# Patient Record
Sex: FEMALE | Race: BLACK OR AFRICAN AMERICAN | NOT HISPANIC OR LATINO
[De-identification: names, ages, dates, MRNs, and addresses within clinical notes are randomized per-mention and may not be internally consistent; named-entity substitution may affect disease eponyms.]

---

## 2017-02-28 ENCOUNTER — TRANSCRIPTION ENCOUNTER (OUTPATIENT)
Age: 42
End: 2017-02-28

## 2017-07-27 ENCOUNTER — EMERGENCY (EMERGENCY)
Facility: HOSPITAL | Age: 42
LOS: 1 days | Discharge: HOME | End: 2017-07-27
Attending: EMERGENCY MEDICINE
Payer: COMMERCIAL

## 2017-07-27 VITALS
WEIGHT: 192.9 LBS | HEART RATE: 82 BPM | OXYGEN SATURATION: 99 % | HEIGHT: 62 IN | RESPIRATION RATE: 18 BRPM | DIASTOLIC BLOOD PRESSURE: 61 MMHG | TEMPERATURE: 99 F | SYSTOLIC BLOOD PRESSURE: 128 MMHG

## 2017-07-27 LAB
APPEARANCE UR: CLEAR — SIGNIFICANT CHANGE UP
BILIRUB UR-MCNC: NEGATIVE — SIGNIFICANT CHANGE UP
COLOR SPEC: YELLOW — SIGNIFICANT CHANGE UP
DIFF PNL FLD: ABNORMAL
EPI CELLS # UR: SIGNIFICANT CHANGE UP
GLUCOSE UR QL: NEGATIVE MG/DL — SIGNIFICANT CHANGE UP
HCG UR QL: NEGATIVE — SIGNIFICANT CHANGE UP
KETONES UR-MCNC: NEGATIVE — SIGNIFICANT CHANGE UP
LEUKOCYTE ESTERASE UR-ACNC: ABNORMAL
NITRITE UR-MCNC: NEGATIVE — SIGNIFICANT CHANGE UP
PH UR: 7 — SIGNIFICANT CHANGE UP (ref 5–8)
PROT UR-MCNC: 30 MG/DL
RBC CASTS # UR COMP ASSIST: >50 /HPF (ref 0–4)
SP GR SPEC: 1.01 — SIGNIFICANT CHANGE UP (ref 1.01–1.02)
UROBILINOGEN FLD QL: 1 MG/DL
WBC UR QL: SIGNIFICANT CHANGE UP

## 2017-07-27 PROCEDURE — 96372 THER/PROPH/DIAG INJ SC/IM: CPT

## 2017-07-27 PROCEDURE — 99284 EMERGENCY DEPT VISIT MOD MDM: CPT

## 2017-07-27 PROCEDURE — 72100 X-RAY EXAM L-S SPINE 2/3 VWS: CPT

## 2017-07-27 PROCEDURE — 72100 X-RAY EXAM L-S SPINE 2/3 VWS: CPT | Mod: 26

## 2017-07-27 PROCEDURE — 99283 EMERGENCY DEPT VISIT LOW MDM: CPT | Mod: 25

## 2017-07-27 PROCEDURE — 81025 URINE PREGNANCY TEST: CPT

## 2017-07-27 PROCEDURE — 81001 URINALYSIS AUTO W/SCOPE: CPT

## 2017-07-27 RX ORDER — KETOROLAC TROMETHAMINE 30 MG/ML
60 SYRINGE (ML) INJECTION ONCE
Qty: 0 | Refills: 0 | Status: DISCONTINUED | OUTPATIENT
Start: 2017-07-27 | End: 2017-07-27

## 2017-07-27 RX ORDER — METHOCARBAMOL 500 MG/1
1500 TABLET, FILM COATED ORAL ONCE
Qty: 0 | Refills: 0 | Status: COMPLETED | OUTPATIENT
Start: 2017-07-27 | End: 2017-07-27

## 2017-07-27 RX ORDER — NITROFURANTOIN MACROCRYSTAL 50 MG
1 CAPSULE ORAL
Qty: 14 | Refills: 0 | OUTPATIENT
Start: 2017-07-27 | End: 2017-08-03

## 2017-07-27 RX ORDER — NITROFURANTOIN MACROCRYSTAL 50 MG
100 CAPSULE ORAL ONCE
Qty: 0 | Refills: 0 | Status: COMPLETED | OUTPATIENT
Start: 2017-07-27 | End: 2017-07-27

## 2017-07-27 RX ADMIN — METHOCARBAMOL 1500 MILLIGRAM(S): 500 TABLET, FILM COATED ORAL at 12:59

## 2017-07-27 RX ADMIN — Medication 60 MILLIGRAM(S): at 13:27

## 2017-07-27 RX ADMIN — Medication 100 MILLIGRAM(S): at 13:30

## 2017-07-27 RX ADMIN — Medication 60 MILLIGRAM(S): at 12:59

## 2017-07-27 NOTE — ED ADULT NURSE NOTE - OBJECTIVE STATEMENT
c/o low back pain after fall, denies pregnancy, medicated for pain per md ambulatory with steady gait

## 2017-07-27 NOTE — ED STATDOCS - ATTENDING CONTRIBUTION TO CARE
I, Joanne Stauffer, performed the initial face to face bedside interview with this patient regarding history of present illness, review of symptoms and relevant past medical, social and family history.  I completed an independent physical examination.  I was the initial provider who evaluated this patient. I have signed out the follow up of any pending tests (i.e. labs, radiological studies) to the ACP.  I have communicated the patient’s plan of care and disposition with the ACP.  The history, relevant review of systems, past medical and surgical history, medical decision making, and physical examination was documented by the scribe in my presence and I attest to the accuracy of the documentation.

## 2017-07-27 NOTE — ED STATDOCS - OBJECTIVE STATEMENT
40 y/o F PMHx back pain presents to ED c/o low back pain s/p fall x 2 days. Associated Sx of dysuria. Pt reports a few days she was sitting on a chair and slipped off her chair landing on her buttocks. Pt works at [employer] and reports pain worsening. Pt took 2 ibuprofen to no relief. LNMP one month ago. Had IUD, recently removed. Denies N/V/D, fever, chills, SOB, CP, difficulty breathing, HA, numbness, tingling and abd pain.

## 2017-07-27 NOTE — ED STATDOCS - PROGRESS NOTE DETAILS
Patient presents c/o low back pain s/p falling of chair 2 days ago and landing on buttocks, no radiculopathy, no weakness, no saddle anesthesia.  patient also c/o mild burning to urine since yesterday, no fever, no chills, no n/v/d. positive tenderness to lumbar spine, no sacral tenderness, FROM, STrength 5/5 to extremities

## 2017-07-27 NOTE — ED STATDOCS - MUSCULOSKELETAL, MLM
Upper lumbar spine TTP. Paravertebral muscle tenderness on L lumbar region. No TTP over sacral sulci. ROM not limited on hip.

## 2017-07-28 ENCOUNTER — EMERGENCY (EMERGENCY)
Facility: HOSPITAL | Age: 42
LOS: 0 days | Discharge: HOME | End: 2017-07-28
Attending: EMERGENCY MEDICINE | Admitting: EMERGENCY MEDICINE
Payer: COMMERCIAL

## 2017-07-28 VITALS
HEART RATE: 84 BPM | TEMPERATURE: 99 F | HEIGHT: 62 IN | RESPIRATION RATE: 15 BRPM | WEIGHT: 192.9 LBS | DIASTOLIC BLOOD PRESSURE: 89 MMHG | OXYGEN SATURATION: 100 % | SYSTOLIC BLOOD PRESSURE: 119 MMHG

## 2017-07-28 VITALS — HEIGHT: 62 IN | WEIGHT: 192.9 LBS

## 2017-07-28 DIAGNOSIS — Z91.81 HISTORY OF FALLING: ICD-10-CM

## 2017-07-28 DIAGNOSIS — M54.5 LOW BACK PAIN: ICD-10-CM

## 2017-07-28 PROCEDURE — 99284 EMERGENCY DEPT VISIT MOD MDM: CPT

## 2017-07-28 RX ORDER — KETOROLAC TROMETHAMINE 30 MG/ML
60 SYRINGE (ML) INJECTION ONCE
Qty: 0 | Refills: 0 | Status: DISCONTINUED | OUTPATIENT
Start: 2017-07-28 | End: 2017-07-28

## 2017-07-28 RX ORDER — METHOCARBAMOL 500 MG/1
1 TABLET, FILM COATED ORAL
Qty: 15 | Refills: 0 | OUTPATIENT
Start: 2017-07-28 | End: 2017-08-02

## 2017-07-28 RX ORDER — DEXAMETHASONE 0.5 MG/5ML
10 ELIXIR ORAL ONCE
Qty: 0 | Refills: 0 | Status: COMPLETED | OUTPATIENT
Start: 2017-07-28 | End: 2017-07-28

## 2017-07-28 RX ORDER — METHOCARBAMOL 500 MG/1
1500 TABLET, FILM COATED ORAL ONCE
Qty: 0 | Refills: 0 | Status: COMPLETED | OUTPATIENT
Start: 2017-07-28 | End: 2017-07-28

## 2017-07-28 RX ADMIN — METHOCARBAMOL 1500 MILLIGRAM(S): 500 TABLET, FILM COATED ORAL at 11:01

## 2017-07-28 RX ADMIN — Medication 10 MILLIGRAM(S): at 11:01

## 2017-07-28 RX ADMIN — Medication 60 MILLIGRAM(S): at 11:01

## 2017-07-28 NOTE — ED ADULT TRIAGE NOTE - CHIEF COMPLAINT QUOTE
c/o LBP s/p fall from chair "landed on my butt"; reports loss of bladder function now and increased pain with movement, denies pain at rest. Was seen at [employer] yesterday where she works and had an xray, was advised imaging was negative for acute findings.

## 2017-07-28 NOTE — ED STATDOCS - PROGRESS NOTE DETAILS
DEE DEE Stinson:  Intake provider's note reviewed. Pt p/w c/o back pain s/p fall. Denies any urinary complaint, saddle anesthesia, bowel or bladder incontinence.  PE: NAD. CV: S1, S2. Lungs: CTA bilat. Abd: soft, NT, ND. Neuro: LE 5/5 bilat.   Plan: pain meds, outpt f/u.

## 2017-07-28 NOTE — ED ADULT NURSE NOTE - CHIEF COMPLAINT QUOTE
c/o LBP s/p fall from chair "landed on my butt"; reports loss of bladder function now and increased pain with movement, denies pain at rest. Was seen at [facility] yesterday where she works and had an xray, was advised imaging was negative for acute findings.

## 2017-07-28 NOTE — ED STATDOCS - ATTENDING CONTRIBUTION TO CARE
I, David Yarbrough, performed the initial face to face bedside interview with this patient regarding history of present illness, review of symptoms and relevant past medical, social and family history.  I completed an independent physical examination.  I was the initial provider who evaluated this patient. I have signed out the follow up of any pending tests (i.e. labs, radiological studies) to the ACP.  I have communicated the patient’s plan of care and disposition with the ACP.  The history, relevant review of systems, past medical and surgical history, medical decision making, and physical examination was documented by the scribe in my presence and I attest to the accuracy of the documentation.

## 2017-07-28 NOTE — ED STATDOCS - MEDICAL DECISION MAKING DETAILS
40 y/o F presents to ED c/o back pain and injury s/p fall from chair two days ago. Likely sciatica. X-ray from [facility] yesterday was normal. Plan anti-inflammatory medicine and recommend follow up with PMD.

## 2017-07-28 NOTE — ED STATDOCS - OBJECTIVE STATEMENT
42 y/o F presents to ED c/o back pain and injury s/p fall from chair two days ago. Pt states that the pain radiates to the back of the legs and that she "cannot move" with involuntary urination, and heavy bleeding due to period. Pt states she was at [facility] yesterday and the results were negative. Pt states she has no difficulty walking. Pt has no other complaints and denies SOB, CP, fever/chills, n/v/d and HA.

## 2017-07-28 NOTE — ED ADULT TRIAGE NOTE - ARRIVAL FROM
Glad to know she will have some relief now. Thank you for the update  
PATIENT CALLED TO LET YOU KNOW SHE IS HAVING SURGERY TODAY AT [facility] ON HER BACK.       
Home

## 2018-03-22 ENCOUNTER — TRANSCRIPTION ENCOUNTER (OUTPATIENT)
Age: 43
End: 2018-03-22

## 2018-07-16 PROBLEM — M54.9 DORSALGIA, UNSPECIFIED: Chronic | Status: INACTIVE | Noted: 2017-07-27 | Resolved: 2017-07-28

## 2019-01-26 ENCOUNTER — EMERGENCY (EMERGENCY)
Facility: HOSPITAL | Age: 44
LOS: 0 days | Discharge: HOME | End: 2019-01-26
Attending: EMERGENCY MEDICINE | Admitting: EMERGENCY MEDICINE
Payer: COMMERCIAL

## 2019-01-26 VITALS — WEIGHT: 274.92 LBS | HEIGHT: 65 IN

## 2019-01-26 VITALS
TEMPERATURE: 98 F | SYSTOLIC BLOOD PRESSURE: 122 MMHG | DIASTOLIC BLOOD PRESSURE: 80 MMHG | RESPIRATION RATE: 16 BRPM | OXYGEN SATURATION: 100 % | HEART RATE: 88 BPM

## 2019-01-26 DIAGNOSIS — Z79.1 LONG TERM (CURRENT) USE OF NON-STEROIDAL ANTI-INFLAMMATORIES (NSAID): ICD-10-CM

## 2019-01-26 DIAGNOSIS — N93.9 ABNORMAL UTERINE AND VAGINAL BLEEDING, UNSPECIFIED: ICD-10-CM

## 2019-01-26 DIAGNOSIS — Z3A.01 LESS THAN 8 WEEKS GESTATION OF PREGNANCY: ICD-10-CM

## 2019-01-26 DIAGNOSIS — O20.0 THREATENED ABORTION: ICD-10-CM

## 2019-01-26 LAB
ALBUMIN SERPL ELPH-MCNC: 3.3 G/DL — SIGNIFICANT CHANGE UP (ref 3.3–5)
ALP SERPL-CCNC: 66 U/L — SIGNIFICANT CHANGE UP (ref 40–120)
ALT FLD-CCNC: 25 U/L — SIGNIFICANT CHANGE UP (ref 12–78)
ANION GAP SERPL CALC-SCNC: 6 MMOL/L — SIGNIFICANT CHANGE UP (ref 5–17)
APPEARANCE UR: ABNORMAL
APTT BLD: 29.3 SEC — SIGNIFICANT CHANGE UP (ref 27.5–36.3)
AST SERPL-CCNC: 16 U/L — SIGNIFICANT CHANGE UP (ref 15–37)
BACTERIA # UR AUTO: ABNORMAL
BASOPHILS # BLD AUTO: 0.03 K/UL — SIGNIFICANT CHANGE UP (ref 0–0.2)
BASOPHILS NFR BLD AUTO: 0.5 % — SIGNIFICANT CHANGE UP (ref 0–2)
BILIRUB SERPL-MCNC: 0.2 MG/DL — SIGNIFICANT CHANGE UP (ref 0.2–1.2)
BILIRUB UR-MCNC: NEGATIVE — SIGNIFICANT CHANGE UP
BLD GP AB SCN SERPL QL: SIGNIFICANT CHANGE UP
BUN SERPL-MCNC: 9 MG/DL — SIGNIFICANT CHANGE UP (ref 7–23)
CALCIUM SERPL-MCNC: 9 MG/DL — SIGNIFICANT CHANGE UP (ref 8.5–10.1)
CHLORIDE SERPL-SCNC: 105 MMOL/L — SIGNIFICANT CHANGE UP (ref 96–108)
CO2 SERPL-SCNC: 27 MMOL/L — SIGNIFICANT CHANGE UP (ref 22–31)
COLOR SPEC: YELLOW — SIGNIFICANT CHANGE UP
CREAT SERPL-MCNC: 0.73 MG/DL — SIGNIFICANT CHANGE UP (ref 0.5–1.3)
DIFF PNL FLD: ABNORMAL
EOSINOPHIL # BLD AUTO: 0.11 K/UL — SIGNIFICANT CHANGE UP (ref 0–0.5)
EOSINOPHIL NFR BLD AUTO: 1.8 % — SIGNIFICANT CHANGE UP (ref 0–6)
EPI CELLS # UR: SIGNIFICANT CHANGE UP
GLUCOSE SERPL-MCNC: 121 MG/DL — HIGH (ref 70–99)
GLUCOSE UR QL: NEGATIVE MG/DL — SIGNIFICANT CHANGE UP
HCG SERPL-ACNC: HIGH MIU/ML
HCT VFR BLD CALC: 37.5 % — SIGNIFICANT CHANGE UP (ref 34.5–45)
HGB BLD-MCNC: 12.2 G/DL — SIGNIFICANT CHANGE UP (ref 11.5–15.5)
IMM GRANULOCYTES NFR BLD AUTO: 0.2 % — SIGNIFICANT CHANGE UP (ref 0–1.5)
INR BLD: 1.06 RATIO — SIGNIFICANT CHANGE UP (ref 0.88–1.16)
KETONES UR-MCNC: NEGATIVE — SIGNIFICANT CHANGE UP
LEUKOCYTE ESTERASE UR-ACNC: ABNORMAL
LYMPHOCYTES # BLD AUTO: 2.06 K/UL — SIGNIFICANT CHANGE UP (ref 1–3.3)
LYMPHOCYTES # BLD AUTO: 33 % — SIGNIFICANT CHANGE UP (ref 13–44)
MCHC RBC-ENTMCNC: 27 PG — SIGNIFICANT CHANGE UP (ref 27–34)
MCHC RBC-ENTMCNC: 32.5 GM/DL — SIGNIFICANT CHANGE UP (ref 32–36)
MCV RBC AUTO: 83 FL — SIGNIFICANT CHANGE UP (ref 80–100)
MONOCYTES # BLD AUTO: 0.59 K/UL — SIGNIFICANT CHANGE UP (ref 0–0.9)
MONOCYTES NFR BLD AUTO: 9.4 % — SIGNIFICANT CHANGE UP (ref 2–14)
NEUTROPHILS # BLD AUTO: 3.45 K/UL — SIGNIFICANT CHANGE UP (ref 1.8–7.4)
NEUTROPHILS NFR BLD AUTO: 55.1 % — SIGNIFICANT CHANGE UP (ref 43–77)
NITRITE UR-MCNC: NEGATIVE — SIGNIFICANT CHANGE UP
NRBC # BLD: 0 /100 WBCS — SIGNIFICANT CHANGE UP (ref 0–0)
PH UR: 7 — SIGNIFICANT CHANGE UP (ref 5–8)
PLATELET # BLD AUTO: 326 K/UL — SIGNIFICANT CHANGE UP (ref 150–400)
POTASSIUM SERPL-MCNC: 4.1 MMOL/L — SIGNIFICANT CHANGE UP (ref 3.5–5.3)
POTASSIUM SERPL-SCNC: 4.1 MMOL/L — SIGNIFICANT CHANGE UP (ref 3.5–5.3)
PROT SERPL-MCNC: 7.9 GM/DL — SIGNIFICANT CHANGE UP (ref 6–8.3)
PROT UR-MCNC: NEGATIVE MG/DL — SIGNIFICANT CHANGE UP
PROTHROM AB SERPL-ACNC: 11.8 SEC — SIGNIFICANT CHANGE UP (ref 10–12.9)
RBC # BLD: 4.52 M/UL — SIGNIFICANT CHANGE UP (ref 3.8–5.2)
RBC # FLD: 16.4 % — HIGH (ref 10.3–14.5)
RBC CASTS # UR COMP ASSIST: >50 /HPF (ref 0–4)
SODIUM SERPL-SCNC: 138 MMOL/L — SIGNIFICANT CHANGE UP (ref 135–145)
SP GR SPEC: 1 — LOW (ref 1.01–1.02)
TYPE + AB SCN PNL BLD: SIGNIFICANT CHANGE UP
UROBILINOGEN FLD QL: NEGATIVE MG/DL — SIGNIFICANT CHANGE UP
WBC # BLD: 6.25 K/UL — SIGNIFICANT CHANGE UP (ref 3.8–10.5)
WBC # FLD AUTO: 6.25 K/UL — SIGNIFICANT CHANGE UP (ref 3.8–10.5)
WBC UR QL: SIGNIFICANT CHANGE UP

## 2019-01-26 PROCEDURE — 76801 OB US < 14 WKS SINGLE FETUS: CPT | Mod: 26

## 2019-01-26 PROCEDURE — 99283 EMERGENCY DEPT VISIT LOW MDM: CPT

## 2019-01-26 PROCEDURE — 76815 OB US LIMITED FETUS(S): CPT | Mod: 26

## 2019-01-26 RX ORDER — SODIUM CHLORIDE 9 MG/ML
1000 INJECTION INTRAMUSCULAR; INTRAVENOUS; SUBCUTANEOUS ONCE
Qty: 0 | Refills: 0 | Status: COMPLETED | OUTPATIENT
Start: 2019-01-26 | End: 2019-01-26

## 2019-01-26 RX ADMIN — SODIUM CHLORIDE 9999 MILLILITER(S): 9 INJECTION INTRAMUSCULAR; INTRAVENOUS; SUBCUTANEOUS at 21:15

## 2019-01-26 RX ADMIN — SODIUM CHLORIDE 1000 MILLILITER(S): 9 INJECTION INTRAMUSCULAR; INTRAVENOUS; SUBCUTANEOUS at 22:15

## 2019-01-26 NOTE — ED STATDOCS - ATTENDING CONTRIBUTION TO CARE
Attending Contribution to Care: I, Ashley Briscoe, performed the initial face to face bedside interview with this patient regarding history of present illness, review of symptoms and relevant past medical, social and family history.  I completed an independent physical examination.  I was the initial provider who evaluated this patient. I have signed out the follow up of any pending tests (i.e. labs, radiological studies) to the ACP.  I have communicated the patient’s plan of care and disposition with the ACP.

## 2019-01-26 NOTE — ED STATDOCS - OBJECTIVE STATEMENT
42 y/o F with no pertinent PMHx presenting to the ED c/o intermittent vaginal bleeding starting two days ago. Pt is ~6 weeks pregnant, LMP . A1. Seen by OB with confirmation of IUP on 01/15. Went back to OB  and informed that the fetus did not appear to be developing normally. Two days ago, pt started experiencing intermittent episodes of vaginal bleeding with associated abdominal cramping. +N/V this morning. Came into ED today for evaluation. Denies any CP, SOB, diarrhea, fevers, or chills. NKDA.

## 2019-01-26 NOTE — ED STATDOCS - PROGRESS NOTE DETAILS
signed Elisa Machado PA-C Pt seen in intake initially by Dr Briscoe.   43F c/o vaginal spotting x 2 days. pt is approx 6 weeks pregnant, LMP 12/5, confirmed IUP on OB office on 1/15.  Sono on 1/22 did not appear to be progessing normally. Awaiting labs, sono. Pt alert, NAD. OB Dr Jaya Ceron. signed Elisa Machado PA-C   No significant findings on labwork. sono pending. Case endorsed to KIN Higgins. signed Elisa Machado PA-C   No significant findings on labwork. sono pending.

## 2019-01-26 NOTE — ED STATDOCS - MEDICAL DECISION MAKING DETAILS
42 y/o pregnant female with vaginal bleeding. Plan: labs, urine, US to evaluate first trimester bleeding, reassess.

## 2019-01-29 ENCOUNTER — ASOB RESULT (OUTPATIENT)
Age: 44
End: 2019-01-29

## 2019-01-29 ENCOUNTER — APPOINTMENT (OUTPATIENT)
Age: 44
End: 2019-01-29
Payer: COMMERCIAL

## 2019-01-29 ENCOUNTER — APPOINTMENT (OUTPATIENT)
Dept: ULTRASOUND IMAGING | Facility: CLINIC | Age: 44
End: 2019-01-29

## 2019-01-29 PROCEDURE — 76817 TRANSVAGINAL US OBSTETRIC: CPT

## 2019-01-30 ENCOUNTER — INPATIENT (INPATIENT)
Facility: HOSPITAL | Age: 44
LOS: 0 days | Discharge: HOME | End: 2019-01-31
Attending: OBSTETRICS & GYNECOLOGY | Admitting: OBSTETRICS & GYNECOLOGY
Payer: COMMERCIAL

## 2019-01-30 VITALS — WEIGHT: 225.97 LBS | HEIGHT: 62 IN

## 2019-01-30 DIAGNOSIS — O34.219 MATERNAL CARE FOR UNSPECIFIED TYPE SCAR FROM PREVIOUS CESAREAN DELIVERY: Chronic | ICD-10-CM

## 2019-01-30 LAB
ALBUMIN SERPL ELPH-MCNC: 3.5 G/DL — SIGNIFICANT CHANGE UP (ref 3.3–5)
ALP SERPL-CCNC: 66 U/L — SIGNIFICANT CHANGE UP (ref 40–120)
ALT FLD-CCNC: 26 U/L — SIGNIFICANT CHANGE UP (ref 12–78)
ANION GAP SERPL CALC-SCNC: 9 MMOL/L — SIGNIFICANT CHANGE UP (ref 5–17)
AST SERPL-CCNC: 14 U/L — LOW (ref 15–37)
BASOPHILS # BLD AUTO: 0.02 K/UL — SIGNIFICANT CHANGE UP (ref 0–0.2)
BASOPHILS NFR BLD AUTO: 0.3 % — SIGNIFICANT CHANGE UP (ref 0–2)
BILIRUB SERPL-MCNC: 0.2 MG/DL — SIGNIFICANT CHANGE UP (ref 0.2–1.2)
BUN SERPL-MCNC: 9 MG/DL — SIGNIFICANT CHANGE UP (ref 7–23)
CALCIUM SERPL-MCNC: 9.1 MG/DL — SIGNIFICANT CHANGE UP (ref 8.5–10.1)
CHLORIDE SERPL-SCNC: 104 MMOL/L — SIGNIFICANT CHANGE UP (ref 96–108)
CO2 SERPL-SCNC: 25 MMOL/L — SIGNIFICANT CHANGE UP (ref 22–31)
CREAT SERPL-MCNC: 0.7 MG/DL — SIGNIFICANT CHANGE UP (ref 0.5–1.3)
EOSINOPHIL # BLD AUTO: 0.09 K/UL — SIGNIFICANT CHANGE UP (ref 0–0.5)
EOSINOPHIL NFR BLD AUTO: 1.2 % — SIGNIFICANT CHANGE UP (ref 0–6)
GLUCOSE SERPL-MCNC: 134 MG/DL — HIGH (ref 70–99)
HCG SERPL-ACNC: HIGH MIU/ML
HCT VFR BLD CALC: 36.5 % — SIGNIFICANT CHANGE UP (ref 34.5–45)
HGB BLD-MCNC: 12 G/DL — SIGNIFICANT CHANGE UP (ref 11.5–15.5)
IMM GRANULOCYTES NFR BLD AUTO: 0.3 % — SIGNIFICANT CHANGE UP (ref 0–1.5)
LYMPHOCYTES # BLD AUTO: 1.63 K/UL — SIGNIFICANT CHANGE UP (ref 1–3.3)
LYMPHOCYTES # BLD AUTO: 22.3 % — SIGNIFICANT CHANGE UP (ref 13–44)
MCHC RBC-ENTMCNC: 27 PG — SIGNIFICANT CHANGE UP (ref 27–34)
MCHC RBC-ENTMCNC: 32.9 GM/DL — SIGNIFICANT CHANGE UP (ref 32–36)
MCV RBC AUTO: 82.2 FL — SIGNIFICANT CHANGE UP (ref 80–100)
MONOCYTES # BLD AUTO: 0.48 K/UL — SIGNIFICANT CHANGE UP (ref 0–0.9)
MONOCYTES NFR BLD AUTO: 6.6 % — SIGNIFICANT CHANGE UP (ref 2–14)
NEUTROPHILS # BLD AUTO: 5.07 K/UL — SIGNIFICANT CHANGE UP (ref 1.8–7.4)
NEUTROPHILS NFR BLD AUTO: 69.3 % — SIGNIFICANT CHANGE UP (ref 43–77)
NRBC # BLD: 0 /100 WBCS — SIGNIFICANT CHANGE UP (ref 0–0)
PLATELET # BLD AUTO: 309 K/UL — SIGNIFICANT CHANGE UP (ref 150–400)
POTASSIUM SERPL-MCNC: 3.6 MMOL/L — SIGNIFICANT CHANGE UP (ref 3.5–5.3)
POTASSIUM SERPL-SCNC: 3.6 MMOL/L — SIGNIFICANT CHANGE UP (ref 3.5–5.3)
PROT SERPL-MCNC: 8.2 GM/DL — SIGNIFICANT CHANGE UP (ref 6–8.3)
RBC # BLD: 4.44 M/UL — SIGNIFICANT CHANGE UP (ref 3.8–5.2)
RBC # FLD: 16 % — HIGH (ref 10.3–14.5)
SODIUM SERPL-SCNC: 138 MMOL/L — SIGNIFICANT CHANGE UP (ref 135–145)
WBC # BLD: 7.31 K/UL — SIGNIFICANT CHANGE UP (ref 3.8–10.5)
WBC # FLD AUTO: 7.31 K/UL — SIGNIFICANT CHANGE UP (ref 3.8–10.5)

## 2019-01-30 PROCEDURE — 99285 EMERGENCY DEPT VISIT HI MDM: CPT

## 2019-01-30 PROCEDURE — 76830 TRANSVAGINAL US NON-OB: CPT | Mod: 26

## 2019-01-30 PROCEDURE — 76856 US EXAM PELVIC COMPLETE: CPT | Mod: 26

## 2019-01-30 PROCEDURE — 99285 EMERGENCY DEPT VISIT HI MDM: CPT | Mod: 25

## 2019-01-30 RX ORDER — HYDROMORPHONE HYDROCHLORIDE 2 MG/ML
1 INJECTION INTRAMUSCULAR; INTRAVENOUS; SUBCUTANEOUS ONCE
Qty: 0 | Refills: 0 | Status: DISCONTINUED | OUTPATIENT
Start: 2019-01-30 | End: 2019-01-30

## 2019-01-30 RX ORDER — ONDANSETRON 8 MG/1
4 TABLET, FILM COATED ORAL EVERY 6 HOURS
Qty: 0 | Refills: 0 | Status: DISCONTINUED | OUTPATIENT
Start: 2019-01-30 | End: 2019-01-31

## 2019-01-30 RX ORDER — MISOPROSTOL 200 UG/1
2 TABLET ORAL
Qty: 2 | Refills: 0 | OUTPATIENT
Start: 2019-01-30 | End: 2019-01-30

## 2019-01-30 RX ORDER — ACETAMINOPHEN 500 MG
1000 TABLET ORAL ONCE
Qty: 0 | Refills: 0 | Status: COMPLETED | OUTPATIENT
Start: 2019-01-30 | End: 2019-01-30

## 2019-01-30 RX ORDER — ACETAMINOPHEN 500 MG
650 TABLET ORAL EVERY 6 HOURS
Qty: 0 | Refills: 0 | Status: DISCONTINUED | OUTPATIENT
Start: 2019-01-30 | End: 2019-01-31

## 2019-01-30 RX ORDER — OXYCODONE AND ACETAMINOPHEN 5; 325 MG/1; MG/1
1 TABLET ORAL ONCE
Qty: 0 | Refills: 0 | Status: DISCONTINUED | OUTPATIENT
Start: 2019-01-30 | End: 2019-01-30

## 2019-01-30 RX ORDER — DIPHENHYDRAMINE HCL 50 MG
50 CAPSULE ORAL ONCE
Qty: 0 | Refills: 0 | Status: COMPLETED | OUTPATIENT
Start: 2019-01-30 | End: 2019-01-30

## 2019-01-30 RX ORDER — ONDANSETRON 8 MG/1
4 TABLET, FILM COATED ORAL ONCE
Qty: 0 | Refills: 0 | Status: COMPLETED | OUTPATIENT
Start: 2019-01-30 | End: 2019-01-30

## 2019-01-30 RX ORDER — MORPHINE SULFATE 50 MG/1
4 CAPSULE, EXTENDED RELEASE ORAL ONCE
Qty: 0 | Refills: 0 | Status: DISCONTINUED | OUTPATIENT
Start: 2019-01-30 | End: 2019-01-30

## 2019-01-30 RX ORDER — SODIUM CHLORIDE 9 MG/ML
1000 INJECTION INTRAMUSCULAR; INTRAVENOUS; SUBCUTANEOUS ONCE
Qty: 0 | Refills: 0 | Status: COMPLETED | OUTPATIENT
Start: 2019-01-30 | End: 2019-01-30

## 2019-01-30 RX ORDER — HYDROMORPHONE HYDROCHLORIDE 2 MG/ML
2 INJECTION INTRAMUSCULAR; INTRAVENOUS; SUBCUTANEOUS
Qty: 0 | Refills: 0 | Status: DISCONTINUED | OUTPATIENT
Start: 2019-01-30 | End: 2019-01-31

## 2019-01-30 RX ORDER — METOCLOPRAMIDE HCL 10 MG
10 TABLET ORAL ONCE
Qty: 0 | Refills: 0 | Status: COMPLETED | OUTPATIENT
Start: 2019-01-30 | End: 2019-01-30

## 2019-01-30 RX ORDER — OXYCODONE AND ACETAMINOPHEN 5; 325 MG/1; MG/1
2 TABLET ORAL EVERY 4 HOURS
Qty: 0 | Refills: 0 | Status: DISCONTINUED | OUTPATIENT
Start: 2019-01-30 | End: 2019-01-31

## 2019-01-30 RX ADMIN — Medication 400 MILLIGRAM(S): at 18:43

## 2019-01-30 RX ADMIN — Medication 10 MILLIGRAM(S): at 23:15

## 2019-01-30 RX ADMIN — MORPHINE SULFATE 4 MILLIGRAM(S): 50 CAPSULE, EXTENDED RELEASE ORAL at 22:37

## 2019-01-30 RX ADMIN — OXYCODONE AND ACETAMINOPHEN 1 TABLET(S): 5; 325 TABLET ORAL at 23:07

## 2019-01-30 RX ADMIN — MORPHINE SULFATE 4 MILLIGRAM(S): 50 CAPSULE, EXTENDED RELEASE ORAL at 19:58

## 2019-01-30 RX ADMIN — Medication 1000 MILLIGRAM(S): at 19:58

## 2019-01-30 RX ADMIN — ONDANSETRON 4 MILLIGRAM(S): 8 TABLET, FILM COATED ORAL at 20:10

## 2019-01-30 RX ADMIN — HYDROMORPHONE HYDROCHLORIDE 1 MILLIGRAM(S): 2 INJECTION INTRAMUSCULAR; INTRAVENOUS; SUBCUTANEOUS at 21:05

## 2019-01-30 RX ADMIN — SODIUM CHLORIDE 2000 MILLILITER(S): 9 INJECTION INTRAMUSCULAR; INTRAVENOUS; SUBCUTANEOUS at 18:43

## 2019-01-30 RX ADMIN — ONDANSETRON 4 MILLIGRAM(S): 8 TABLET, FILM COATED ORAL at 18:43

## 2019-01-30 RX ADMIN — Medication 50 MILLIGRAM(S): at 23:15

## 2019-01-30 RX ADMIN — OXYCODONE AND ACETAMINOPHEN 1 TABLET(S): 5; 325 TABLET ORAL at 22:32

## 2019-01-30 RX ADMIN — HYDROMORPHONE HYDROCHLORIDE 1 MILLIGRAM(S): 2 INJECTION INTRAMUSCULAR; INTRAVENOUS; SUBCUTANEOUS at 20:10

## 2019-01-30 RX ADMIN — MORPHINE SULFATE 4 MILLIGRAM(S): 50 CAPSULE, EXTENDED RELEASE ORAL at 22:55

## 2019-01-30 RX ADMIN — MORPHINE SULFATE 4 MILLIGRAM(S): 50 CAPSULE, EXTENDED RELEASE ORAL at 18:53

## 2019-01-30 NOTE — H&P ADULT - NSHPPHYSICALEXAM_GEN_ALL_CORE
Vital Signs Last 24 Hrs  T(C): 37.1 (30 Jan 2019 21:32), Max: 37.1 (30 Jan 2019 21:32)  T(F): 98.8 (30 Jan 2019 21:32), Max: 98.8 (30 Jan 2019 21:32)  HR: 82 (30 Jan 2019 21:13) (82 - 84)  BP: 122/82 (30 Jan 2019 21:13) (109/91 - 122/82)  BP(mean): --  RR: 18 (30 Jan 2019 21:13) (18 - 20)  SpO2: 100% (30 Jan 2019 21:13) (100% - 100%)

## 2019-01-30 NOTE — ED STATDOCS - ATTENDING CONTRIBUTION TO CARE
I, Mandeep Hackett, performed the initial face to face bedside interview with this patient regarding history of present illness, review of symptoms and relevant past medical, social and family history.  I completed an independent physical examination.  I was the initial provider who evaluated this patient. I have signed out the follow up of any pending tests (i.e. labs, radiological studies) to the ACP.  I have communicated the patient’s plan of care and disposition with the ACP.  The history, relevant review of systems, past medical and surgical history, medical decision making, and physical examination was documented by the scribe in my presence and I attest to the accuracy of the documentation.

## 2019-01-30 NOTE — ED ADULT NURSE NOTE - NSIMPLEMENTINTERV_GEN_ALL_ED
Implemented All Universal Safety Interventions:  [place] to call system. Call bell, personal items and telephone within reach. Instruct patient to call for assistance. Room bathroom lighting operational. Non-slip footwear when patient is off stretcher. Physically safe environment: no spills, clutter or unnecessary equipment. Stretcher in lowest position, wheels locked, appropriate side rails in place.

## 2019-01-30 NOTE — ED ADULT NURSE NOTE - OBJECTIVE STATEMENT
pt c/o lower abd pain started at 1pm today.pt is 5 weeks pregnant spotting, last time changed pad at 3pm.

## 2019-01-30 NOTE — ED ADULT NURSE REASSESSMENT NOTE - NS ED NURSE REASSESS COMMENT FT1
change of shift report received from BLAYNE Reeves, pt is A&Ox3, MAEx4. accompanied by family. pt is c/o abdominal pain after returning from US. DEE DEE Higgins notified, awaiting further orders. will continue to monitor.

## 2019-01-30 NOTE — ED STATDOCS - OBJECTIVE STATEMENT
44 y/o female A1 7 weeks pregnant yesterday with a PMHx of back pain presents to the ED c/o vaginal bleeding, intermittent lower abdominal cramping, nausea, vomiting today. Pt seen on 19 for vaginal bleeding, US showed that fetus is not progressing normally. Pt seen by GYN yesterday for vaginal spotting and the vaginal bleeding has continued since. NKDA. LNMP: . GYN: Dr. Ceron.

## 2019-01-30 NOTE — ED STATDOCS - PROGRESS NOTE DETAILS
42 y/o F, A1 with no PMH presents with persistent vaginal bleeding, described as spotting and lower abdominal cramping. pt was seen for same in this ED 4 days ago, seen by OBGYN 2 days ago. US from 19 shows gestational sac with no fetal pole, estimated 6w GA. Pts LMP 19. Pt states she recently had due date pushed back 2 weeks. Denies recent fever, chills, dysuria, hematuria. +nausea & vomiting. PE: patient uncomfortable. Crying during exam. 44 y/o F, A1 with no PMH presents with persistent vaginal bleeding, described as spotting and lower abdominal cramping. pt was seen for same in this ED 4 days ago, seen by OBGYN 2 days ago. US from 19 shows gestational sac with no fetal pole, estimated 6w GA. Pts LMP 19. Pt states she recently had due date pushed back 2 weeks. Denies recent fever, chills, dysuria, hematuria. +nausea & vomiting. PE: patient uncomfortable. Crying during exam. Cardiac: s1/s2, RRR. Lungs: CTAB. Abdomen: Obese. NBS x4, soft, +lower abdominal tenderness. A/P: Threatened . Plan for labs, analgesia, OBGYN consult. pelvic US. Reassess. - Ramón Harley PA-C D/w Patient's OBGYN. States pt's daughter called emergency line ([phone]) after patient left for hospital. Pt believed at that time to be en route to [facility] as Dr. Ceron does not have privileges at [facility]. Advised to contact inpatient team at  with any complications. If patient able to be discharged may follow up in office tomorrow [address] [place] [address]. - TWAN GentileC Seen and examined by Dr. Yarbrough OB on call. Will send meds to pharmacy. Patient complaining of severe abdominal pain. Dr. Yarbrough called and agreed to have patient admitted for pain control. Rach NP

## 2019-01-30 NOTE — CONSULT NOTE ADULT - SUBJECTIVE AND OBJECTIVE BOX
SUBJECTIVE: 44yo  self-presents to ED due to acute onset of intermittent cramping lower abdominal pain starting at 3pm today. LMP 18. For 1 week patient has had intermittent vaginal spotting, but denies passing blood clots. Patient seen in [facility] ED  and U/S showed intrauterine pregnancy @6w0d with BHcG 16K.    Lower abdominal pain radiating to lower back, no aggravating or alleviating symptoms. Abdominal pain associated with intermittent nausea and 2 bouts of non-bilious, non-bloody emesis.     In the ED, patient received 1L NS bolus, 1G Tylenol IV, Morphine 4mg IV, Dilaudid 1mg IV and Zofran 4mg IV x2. Patient notes that there was some relief with the Dilaudid.      REVIEW OF SYSTEMS:  CONSTITUTIONAL: No weakness, fevers or chills  EYES/ENT: No visual changes;  No vertigo or throat pain   NECK: No pain or stiffness  RESPIRATORY: No cough, wheezing, hemoptysis; No shortness of breath  CARDIOVASCULAR: No chest pain or palpitations  GASTROINTESTINAL: + b/l lower abdominal pain. + nausea and vomiting; No diarrhea or constipation. No melena or hematochezia.  GENITOURINARY: No dysuria, frequency or hematuria  NEUROLOGICAL: No numbness or weakness  SKIN: No itching, burning, rashes, or lesions   GYN: intermittent spotting   All other review of systems is negative unless indicated above    Vital Signs Last 24 Hrs  T(C): 37 (2019 18:18), Max: 37 (2019 18:18)  T(F): 98.6 (2019 18:18), Max: 98.6 (2019 18:18)  HR: 84 (2019 18:18) (84 - 84)  BP: 109/91 (2019 18:18) (109/91 - 109/91)  BP(mean): --  RR: 20 (2019 18:18) (20 - 20)  SpO2: 100% (2019 18:18) (100% - 100%)      PHYSICAL EXAM:  Constitutional: NAD, awake and alert, well-developed; obese  HEENT: PERR, EOMI, Normal Hearing, MMM  Neck: Soft and supple, No LAD, No JVD  Respiratory: Breath sounds are clear bilaterally, No wheezing, rales or rhonchi  Cardiovascular: S1 and S2, regular rate and rhythm, no Murmurs, gallops or rubs  Gastrointestinal: Bowel Sounds present, soft, nontender to deep palpation, nondistended, no guarding, no rebound  Extremities: No peripheral edema  Vascular: 2+ peripheral pulses  Neurological: A/O x 3, no focal deficits  Musculoskeletal: 5/5 strength b/l upper and lower extremities  Skin: No rashes  Vaginal Exam:         LABS: All Labs Reviewed:                        12.0   7.31  )-----------( 309      ( 2019 18:32 )             36.5         138  |  104  |  9   ----------------------------<  134<H>  3.6   |  25  |  0.70    Ca    9.1      2019 18:32    TPro  8.2  /  Alb  3.5  /  TBili  0.2  /  DBili  x   /  AST  14<L>  /  ALT  26  /  AlkPhos  66      < from: US Pelvis Complete (19 @ 19:29) >  FINDINGS:    Uterus: 16.3 x 7.1 x 8.0 cm. no intrauterine gestation is identified.    Endometrium: 2.5 cm. Heterogeneity is noted consistent with blood clots   and missed .    Right ovary: 1.7 x 2.0 x 2.2 cm. Within normal limits.        Left ovary: 2.1 x 2.7 x 2.5 cm. Within normal limits.        Fluid: None.    IMPRESSION:    No evidence of intrauterine gestation.Heterogeneity within thickened   endometrium consistent with missed . SUBJECTIVE: 42yo  self-presents to ED due to acute onset of intermittent cramping lower abdominal pain starting at 3pm today. LMP 18. For 1 week patient has had intermittent vaginal spotting, but denies passing blood clots. Patient seen in [facility] ED  and U/S showed intrauterine pregnancy @6w0d with BHcG 16K. Sent home with f/u    Lower abdominal pain radiating to lower back, no aggravating or alleviating symptoms. Abdominal pain associated with intermittent nausea and 2 bouts of non-bilious, non-bloody emesis.     In the ED, patient received 1L NS bolus, 1G Tylenol IV, Morphine 4mg IV, Dilaudid 1mg IV and Zofran 4mg IV x2. Patient notes that there was some relief with the Dilaudid.      REVIEW OF SYSTEMS:  CONSTITUTIONAL: No weakness, fevers or chills  EYES/ENT: No visual changes;  No vertigo or throat pain   NECK: No pain or stiffness  RESPIRATORY: No cough, wheezing, hemoptysis; No shortness of breath  CARDIOVASCULAR: No chest pain or palpitations  GASTROINTESTINAL: + b/l lower abdominal pain. + nausea and vomiting; No diarrhea or constipation. No melena or hematochezia.  GENITOURINARY: No dysuria, frequency or hematuria  NEUROLOGICAL: No numbness or weakness  SKIN: No itching, burning, rashes, or lesions   GYN: intermittent spotting   All other review of systems is negative unless indicated above    Vital Signs Last 24 Hrs  T(C): 37 (2019 18:18), Max: 37 (2019 18:18)  T(F): 98.6 (2019 18:18), Max: 98.6 (2019 18:18)  HR: 82 (2019 21:13) (82 - 84)  BP: 122/82 (2019 21:13) (109/91 - 122/82)  RR: 18 (2019 21:13) (18 - 20)  SpO2: 100% (2019 21:13) (100% - 100%)      PHYSICAL EXAM:  Constitutional: NAD, awake and alert, well-developed; obese  HEENT: PERR, EOMI, Normal Hearing, MMM  Neck: Soft and supple, No LAD, No JVD  Respiratory: Breath sounds are clear bilaterally, No wheezing, rales or rhonchi  Cardiovascular: S1 and S2, regular rate and rhythm, no Murmurs, gallops or rubs  Gastrointestinal: Bowel Sounds present, soft, nontender to deep palpation, nondistended, no guarding, no rebound  Extremities: No peripheral edema  Vascular: 2+ peripheral pulses  Neurological: A/O x 3, no focal deficits  Musculoskeletal: 5/5 strength b/l upper and lower extremities  Skin: No rashes  Vaginal Exam:         LABS: All Labs Reviewed:                        12.0   7.31  )-----------( 309      ( 2019 18:32 )             36.5         138  |  104  |  9   ----------------------------<  134<H>  3.6   |  25  |  0.70    Ca    9.1      2019 18:32    TPro  8.2  /  Alb  3.5  /  TBili  0.2  /  DBili  x   /  AST  14<L>  /  ALT  26  /  AlkPhos  66      < from: US Pelvis Complete (19 @ 19:29) >  FINDINGS:    Uterus: 16.3 x 7.1 x 8.0 cm. no intrauterine gestation is identified.    Endometrium: 2.5 cm. Heterogeneity is noted consistent with blood clots   and missed .    Right ovary: 1.7 x 2.0 x 2.2 cm. Within normal limits.        Left ovary: 2.1 x 2.7 x 2.5 cm. Within normal limits.        Fluid: None.    IMPRESSION:    No evidence of intrauterine gestation.Heterogeneity within thickened   endometrium consistent with missed . SUBJECTIVE: 44yo  self-presents to ED due to acute onset of intermittent cramping lower abdominal pain starting at 3pm today. LMP 18.  Lower abdominal pain radiating to lower back, no aggravating or alleviating symptoms. Abdominal pain associated with intermittent nausea and 2 bouts of non-bilious, non-bloody emesis.    For 1 week patient has had intermittent vaginal spotting, but denies passing blood clots. Patient seen in [facility] ED  and U/S showed intrauterine pregnancy @6w0d with BHcG 16K. Sent home with f/u with primary OB. Seen by [facility] and sonogram showed 5w4d on 19. Seen by primary OB in AM today for repeat BHCG - symptoms free at that time.     In the ED, patient received 1L NS bolus, 1G Tylenol IV, Morphine 4mg IV, Dilaudid 1mg IV and Zofran 4mg IV x2. Patient notes that there was some relief with the Dilaudid.      REVIEW OF SYSTEMS:  CONSTITUTIONAL: No weakness, fevers or chills  EYES/ENT: No visual changes;  No vertigo or throat pain   NECK: No pain or stiffness  RESPIRATORY: No cough, wheezing, hemoptysis; No shortness of breath  CARDIOVASCULAR: No chest pain or palpitations  GASTROINTESTINAL: + b/l lower abdominal pain. + nausea and vomiting; No diarrhea or constipation. No melena or hematochezia.  GENITOURINARY: No dysuria, frequency or hematuria  NEUROLOGICAL: No numbness or weakness  SKIN: No itching, burning, rashes, or lesions   GYN: intermittent spotting   All other review of systems is negative unless indicated above    Vital Signs Last 24 Hrs  T(C): 37 (2019 18:18), Max: 37 (2019 18:18)  T(F): 98.6 (2019 18:18), Max: 98.6 (2019 18:18)  HR: 82 (2019 21:13) (82 - 84)  BP: 122/82 (2019 21:13) (109/91 - 122/82)  RR: 18 (2019 21:13) (18 - 20)  SpO2: 100% (2019 21:13) (100% - 100%)      PHYSICAL EXAM:  Constitutional: NAD, awake and alert, well-developed; obese  HEENT: PERR, EOMI, Normal Hearing, MMM  Neck: Soft and supple, No LAD, No JVD  Respiratory: Breath sounds are clear bilaterally, No wheezing, rales or rhonchi  Cardiovascular: S1 and S2, regular rate and rhythm, no Murmurs, gallops or rubs  Gastrointestinal: Bowel Sounds present, soft, nontender to deep palpation, nondistended, no guarding, no rebound  Extremities: No peripheral edema  Vascular: 2+ peripheral pulses  Neurological: A/O x 3, no focal deficits  Musculoskeletal: 5/5 strength b/l upper and lower extremities  Skin: No rashes  Vaginal Exam: Closed cervix, blood in vaginal canal        LABS: All Labs Reviewed:                        12.0   7.31  )-----------( 309      ( 2019 18:32 )             36.5         138  |  104  |  9   ----------------------------<  134<H>  3.6   |  25  |  0.70    Ca    9.1      2019 18:32    TPro  8.2  /  Alb  3.5  /  TBili  0.2  /  DBili  x   /  AST  14<L>  /  ALT  26  /  AlkPhos  66      < from: US Pelvis Complete (19 @ 19:29) >  FINDINGS:    Uterus: 16.3 x 7.1 x 8.0 cm. no intrauterine gestation is identified.    Endometrium: 2.5 cm. Heterogeneity is noted consistent with blood clots   and missed .    Right ovary: 1.7 x 2.0 x 2.2 cm. Within normal limits.        Left ovary: 2.1 x 2.7 x 2.5 cm. Within normal limits.        Fluid: None.    IMPRESSION:    No evidence of intrauterine gestation. Heterogeneity within thickened   endometrium consistent with missed .

## 2019-01-30 NOTE — ED ADULT NURSE REASSESSMENT NOTE - NS ED NURSE REASSESS COMMENT FT1
pt has just been medicated, states that she has felt some relief of the pain. awaiting possible OB/GYN consult. will continue to monitor.

## 2019-01-30 NOTE — H&P ADULT - ASSESSMENT
44yo  complaining of intermittent cramping lower abdominal pain x1 day found to have missed .     #Missed   - Admit to OB for pain management of Missed   - Dilaudid 2mg q4 PRN severe pain  - Percocet 5/325mg PRN q6 Moderate Pain  - Misoprostol 400mg Stat to aid miscarriage  - continue to monitor for excessive bleeding    #      Discussed with Dr. Yarbrough, OB-GYN. 42yo  complaining of intermittent cramping lower abdominal pain x1 day found to have missed .     #Missed   - Admit to OB for pain management of Missed   - Pelvic U/S as above  - B-HCG (19) 16K->12K (19)  - Dilaudid 2mg q4 PRN severe pain  - Percocet 5/325mg PRN q6 Moderate Pain  - Misoprostol 400mg Stat to aid miscarriage  - No indication for Rhogam - Mother O+  - repeat B-HCG in AM    #Diet: Regular    #FULL CODE    #DVT Prophylaxis: SCD's    IMPROVE VTE Individual Risk Assessment    RISK                                                                Points    [  ] Previous VTE                                                  3    [  ] Thrombophilia                                               2    [  ] Lower limb paralysis                                      2        (unable to hold up >15 seconds)      [  ] Current Cancer                                              2         (within 6 months)    [  ] Immobilization > 24 hrs                                1    [  ] ICU/CCU stay > 24 hours                              1    [  ] Age > 60                                                      1    IMPROVE VTE Score ___0______      Discussed with Dr. Yarbrough, OB-GYN. 44yo  complaining of intermittent cramping lower abdominal pain x1 day found to have missed .     #Missed   - Admit to OB for pain management of Missed   - Pelvic U/S as above  - B-HCG (19) 16K->12K (19)  - Dilaudid 2mg q6 PRN severe pain  - Percocet 5/325mg PRN q4 Moderate Pain  - Misoprostol 400mg Stat to aid miscarriage  - No indication for Rhogam - Mother O+  - repeat B-HCG in AM    #Diet: Regular    #FULL CODE    #DVT Prophylaxis: SCD's    IMPROVE VTE Individual Risk Assessment    RISK                                                                Points    [  ] Previous VTE                                                  3    [  ] Thrombophilia                                               2    [  ] Lower limb paralysis                                      2        (unable to hold up >15 seconds)      [  ] Current Cancer                                              2         (within 6 months)    [  ] Immobilization > 24 hrs                                1    [  ] ICU/CCU stay > 24 hours                              1    [  ] Age > 60                                                      1    IMPROVE VTE Score ___0______      Discussed with Dr. Yarbrough, OB-GYN.

## 2019-01-30 NOTE — ED ADULT TRIAGE NOTE - CHIEF COMPLAINT QUOTE
patient states she is 5 weeks yesterday saw her dr banuelos yesterday bcs she was spotting.  states she had a sonogram and he pushed back due date d/t size of fetus.  patient started having lower abdominal cramping at 330 pm with bleeding patient states she is 5 weeks yesterday saw her dr banuelos yesterday bcs she was spotting.  states she had a sonogram and he pushed back due date d/t size of fetus.  patient started having lower abdominal cramping at 330 pm with bleeding.  patient states pain is intermittent, but sharp when she is experiencing it.

## 2019-01-30 NOTE — H&P ADULT - HISTORY OF PRESENT ILLNESS
42yo  self-presents to ED due to acute onset of intermittent cramping lower abdominal pain starting at 3pm today. LMP 18.  Lower abdominal pain radiating to lower back, no aggravating or alleviating symptoms. Abdominal pain associated with intermittent nausea and 2 bouts of non-bilious, non-bloody emesis.    For 1 week patient has had intermittent vaginal spotting, but denies passing blood clots. Patient seen in [facility] ED  and U/S showed intrauterine pregnancy @6w0d with BHcG 16K. Sent home with f/u with primary OB. Seen by [facility] and sonogram showed 5w4d on 19. Seen by primary OB in AM today for repeat BHCG - symptoms free at that time.     In the ED, patient received 1L NS bolus, 1G Tylenol IV, Morphine 4mg IV, Dilaudid 1mg IV and Zofran 4mg IV x2. Patient notes that there was some relief with the Dilaudid. 42yo  self-presents to ED due to acute onset of intermittent cramping lower abdominal pain starting at 3pm on day of admission. LMP 18.  Lower abdominal pain radiating to lower back, no aggravating or alleviating symptoms. Abdominal pain associated with intermittent nausea and 2 bouts of non-bilious, non-bloody emesis.    For 1 week patient has had intermittent vaginal spotting, but denies passing blood clots. Patient seen in [facility]  and U/S showed intrauterine pregnancy @6w0d with BHcG 16K. Sent home with f/u with primary OB. Seen by [facility] and sonogram showed 5w4d on 19. Seen by primary OB in AM of admission for repeat BHCG - symptoms free at that time.     In the ED, patient received 1L NS bolus, 1G Tylenol IV, Morphine 4mg IV, Dilaudid 1mg IV and Zofran 4mg IV x2. Patient notes that there was some relief with the Dilaudid.

## 2019-01-30 NOTE — CONSULT NOTE ADULT - ASSESSMENT
44yo  complaining of intermittent cramping lower abdominal pain x1 day found to have missed .     #Missed   - VSS   - O positive -> Rhogam not indicated  - increased pain likely 2/2 contractions of fibroid uterus  - Misoprostol x2 pill  - Percocet 5/325 1-2 pills [id number] PRN moderate pain; Motrin q6 PRN mild pain   - Misoprostol and Percocet sent to [facility] by Dr. Linnea johnson for D/C with follow-up with primary OB with trending of BhCG  - Plan discussed with  and agreed upon by patient and ED NP.     Thank you for allowing us to participate in Ms. Rachid brush. 42yo  complaining of intermittent cramping lower abdominal pain x1 day found to have missed .     #Missed   - VSS   - O positive -> Rhogam not indicated  - increased pain likely 2/2 contractions of fibroid uterus  - Misoprostol x2 pills to aid completion of   - Hydrocodone-Ibuprofen 7.5/200mg 1pill q4-6hours PRN  - Misoprostol and Percocet sent to [facility] by Dr. Linnea johnson for D/C with follow-up with primary OB with trending of BhCG  - Plan discussed with  and agreed upon by patient and ED NP.     Thank you for allowing us to participate in Ms. Rachid brush.

## 2019-01-30 NOTE — H&P ADULT - NSHPREVIEWOFSYSTEMS_GEN_ALL_CORE
CONSTITUTIONAL: No weakness, fevers or chills  EYES/ENT: No visual changes;  No vertigo or throat pain   NECK: No pain or stiffness  RESPIRATORY: No cough, wheezing, hemoptysis; No shortness of breath  CARDIOVASCULAR: No chest pain or palpitations  GASTROINTESTINAL: + b/l lower abdominal pain. + nausea and vomiting; No diarrhea or constipation. No melena or hematochezia.  GENITOURINARY: No dysuria, frequency or hematuria  NEUROLOGICAL: No numbness or weakness  SKIN: No itching, burning, rashes, or lesions   GYN: intermittent spotting   All other review of systems is negative unless indicated above

## 2019-01-30 NOTE — ED ADULT NURSE NOTE - CHIEF COMPLAINT QUOTE
patient states she is 5 weeks yesterday saw her dr banuelos yesterday bcs she was spotting.  states she had a sonogram and he pushed back due date d/t size of fetus.  patient started having lower abdominal cramping at 330 pm with bleeding.  patient states pain is intermittent, but sharp when she is experiencing it.

## 2019-01-31 ENCOUNTER — TRANSCRIPTION ENCOUNTER (OUTPATIENT)
Age: 44
End: 2019-01-31

## 2019-01-31 VITALS
DIASTOLIC BLOOD PRESSURE: 78 MMHG | OXYGEN SATURATION: 100 % | RESPIRATION RATE: 19 BRPM | HEART RATE: 99 BPM | SYSTOLIC BLOOD PRESSURE: 125 MMHG | TEMPERATURE: 99 F

## 2019-01-31 PROBLEM — M54.9 DORSALGIA, UNSPECIFIED: Chronic | Status: ACTIVE | Noted: 2017-07-28

## 2019-01-31 LAB
ANION GAP SERPL CALC-SCNC: 6 MMOL/L — SIGNIFICANT CHANGE UP (ref 5–17)
APPEARANCE UR: CLEAR — SIGNIFICANT CHANGE UP
BACTERIA # UR AUTO: ABNORMAL
BASOPHILS # BLD AUTO: 0.01 K/UL — SIGNIFICANT CHANGE UP (ref 0–0.2)
BASOPHILS NFR BLD AUTO: 0.1 % — SIGNIFICANT CHANGE UP (ref 0–2)
BILIRUB UR-MCNC: NEGATIVE — SIGNIFICANT CHANGE UP
BUN SERPL-MCNC: 5 MG/DL — LOW (ref 7–23)
CALCIUM SERPL-MCNC: 8.8 MG/DL — SIGNIFICANT CHANGE UP (ref 8.5–10.1)
CHLORIDE SERPL-SCNC: 106 MMOL/L — SIGNIFICANT CHANGE UP (ref 96–108)
CO2 SERPL-SCNC: 25 MMOL/L — SIGNIFICANT CHANGE UP (ref 22–31)
COLOR SPEC: YELLOW — SIGNIFICANT CHANGE UP
COMMENT - URINE: SIGNIFICANT CHANGE UP
CREAT SERPL-MCNC: 0.58 MG/DL — SIGNIFICANT CHANGE UP (ref 0.5–1.3)
DIFF PNL FLD: ABNORMAL
EOSINOPHIL # BLD AUTO: 0 K/UL — SIGNIFICANT CHANGE UP (ref 0–0.5)
EOSINOPHIL NFR BLD AUTO: 0 % — SIGNIFICANT CHANGE UP (ref 0–6)
EPI CELLS # UR: SIGNIFICANT CHANGE UP
GLUCOSE SERPL-MCNC: 128 MG/DL — HIGH (ref 70–99)
GLUCOSE UR QL: NEGATIVE MG/DL — SIGNIFICANT CHANGE UP
HCG SERPL-ACNC: 6471 MIU/ML — HIGH
HCT VFR BLD CALC: 35.4 % — SIGNIFICANT CHANGE UP (ref 34.5–45)
HGB BLD-MCNC: 11.7 G/DL — SIGNIFICANT CHANGE UP (ref 11.5–15.5)
IMM GRANULOCYTES NFR BLD AUTO: 0.3 % — SIGNIFICANT CHANGE UP (ref 0–1.5)
KETONES UR-MCNC: ABNORMAL
LEUKOCYTE ESTERASE UR-ACNC: NEGATIVE — SIGNIFICANT CHANGE UP
LYMPHOCYTES # BLD AUTO: 1.1 K/UL — SIGNIFICANT CHANGE UP (ref 1–3.3)
LYMPHOCYTES # BLD AUTO: 9.8 % — LOW (ref 13–44)
MCHC RBC-ENTMCNC: 26.9 PG — LOW (ref 27–34)
MCHC RBC-ENTMCNC: 33.1 GM/DL — SIGNIFICANT CHANGE UP (ref 32–36)
MCV RBC AUTO: 81.4 FL — SIGNIFICANT CHANGE UP (ref 80–100)
MONOCYTES # BLD AUTO: 0.7 K/UL — SIGNIFICANT CHANGE UP (ref 0–0.9)
MONOCYTES NFR BLD AUTO: 6.2 % — SIGNIFICANT CHANGE UP (ref 2–14)
NEUTROPHILS # BLD AUTO: 9.4 K/UL — HIGH (ref 1.8–7.4)
NEUTROPHILS NFR BLD AUTO: 83.6 % — HIGH (ref 43–77)
NITRITE UR-MCNC: NEGATIVE — SIGNIFICANT CHANGE UP
NRBC # BLD: 0 /100 WBCS — SIGNIFICANT CHANGE UP (ref 0–0)
PH UR: 8 — SIGNIFICANT CHANGE UP (ref 5–8)
PLATELET # BLD AUTO: 296 K/UL — SIGNIFICANT CHANGE UP (ref 150–400)
POTASSIUM SERPL-MCNC: 3.6 MMOL/L — SIGNIFICANT CHANGE UP (ref 3.5–5.3)
POTASSIUM SERPL-SCNC: 3.6 MMOL/L — SIGNIFICANT CHANGE UP (ref 3.5–5.3)
PROT UR-MCNC: 30 MG/DL
RBC # BLD: 4.35 M/UL — SIGNIFICANT CHANGE UP (ref 3.8–5.2)
RBC # FLD: 15.9 % — HIGH (ref 10.3–14.5)
RBC CASTS # UR COMP ASSIST: ABNORMAL /HPF (ref 0–4)
SODIUM SERPL-SCNC: 137 MMOL/L — SIGNIFICANT CHANGE UP (ref 135–145)
SP GR SPEC: 1.01 — SIGNIFICANT CHANGE UP (ref 1.01–1.02)
UROBILINOGEN FLD QL: NEGATIVE MG/DL — SIGNIFICANT CHANGE UP
WBC # BLD: 11.24 K/UL — HIGH (ref 3.8–10.5)
WBC # FLD AUTO: 11.24 K/UL — HIGH (ref 3.8–10.5)
WBC UR QL: SIGNIFICANT CHANGE UP

## 2019-01-31 RX ORDER — LANOLIN ALCOHOL/MO/W.PET/CERES
3 CREAM (GRAM) TOPICAL AT BEDTIME
Qty: 0 | Refills: 0 | Status: DISCONTINUED | OUTPATIENT
Start: 2019-01-31 | End: 2019-01-31

## 2019-01-31 RX ADMIN — ONDANSETRON 4 MILLIGRAM(S): 8 TABLET, FILM COATED ORAL at 00:37

## 2019-01-31 RX ADMIN — OXYCODONE AND ACETAMINOPHEN 2 TABLET(S): 5; 325 TABLET ORAL at 00:37

## 2019-01-31 RX ADMIN — HYDROMORPHONE HYDROCHLORIDE 2 MILLIGRAM(S): 2 INJECTION INTRAMUSCULAR; INTRAVENOUS; SUBCUTANEOUS at 02:10

## 2019-01-31 RX ADMIN — HYDROMORPHONE HYDROCHLORIDE 2 MILLIGRAM(S): 2 INJECTION INTRAMUSCULAR; INTRAVENOUS; SUBCUTANEOUS at 01:40

## 2019-01-31 NOTE — DISCHARGE NOTE ADULT - CARE PROVIDER_API CALL
Jaya Ceron)  Gynecology  [address] [place] [address]  Phone: [phone]  Fax: [phone]

## 2019-01-31 NOTE — DISCHARGE NOTE ADULT - PATIENT PORTAL LINK FT
You can access the [facility] Patient Portal, offered by [facility], by registering with the following website: [email or web address]

## 2019-01-31 NOTE — DISCHARGE NOTE ADULT - CARE PLAN
Principal Discharge DX:	Spontaneous   Goal:	Resolution  Assessment and plan of treatment:	Please follow up with Dr. Ceron for repeat sonogram to confirm expulsion of products of conception.

## 2019-01-31 NOTE — DISCHARGE NOTE ADULT - HOSPITAL COURSE
42yo  self-presented to ED due to acute onset of intermittent cramping lower abdominal pain starting at 3pm on day of admission. LMP 18.  Lower abdominal pain radiating to lower back, no aggravating or alleviating symptoms. Abdominal pain associated with intermittent nausea and 2 bouts of non-bilious, non-bloody emesis. For 1 week patient has had intermittent vaginal spotting, but denies passing blood clots. Patient seen in [facility]  and U/S showed intrauterine pregnancy @6w0d with BHcG 16K. Sent home with f/u with primary OB. Seen by [facility] and sonogram showed 5w4d on 19. Seen by primary OB in AM of admission for repeat BHCG - symptoms free at that time. In the ED, patient received 1L NS bolus, 1G Tylenol IV, Morphine 4mg IV, Dilaudid 1mg IV and Zofran 4mg IV x2. Patient notes that there was some relief with the Dilaudid.  Patient then had resolution of pain and very minimal vaginal bleeding. Rhogam not necessary due to patient's blood type being O positive. Patient was stable for discharge home with repeat sonogram outpatient with Dr Daily. 42yo  self-presented to ED due to acute onset of intermittent cramping lower abdominal pain starting at 3pm on day of admission. LMP 18.  Lower abdominal pain radiating to lower back, no aggravating or alleviating symptoms. Abdominal pain associated with intermittent nausea and 2 bouts of non-bilious, non-bloody emesis. For 1 week patient has had intermittent vaginal spotting, but denies passing blood clots. Patient seen in [facility] ED  and U/S showed intrauterine pregnancy @6w0d with BHcG 16K. Sent home with f/u with primary OB. Seen by [facility] and sonogram showed 5w4d on 19. Seen by primary OB in AM of admission for repeat BHCG - symptoms free at that time. In the ED, patient received 1L NS bolus, 1G Tylenol IV, Morphine 4mg IV, Dilaudid 1mg IV and Zofran 4mg IV x2. Patient notes that there was some relief with the Dilaudid.  US pelvis/transvaginal US showed no evidence of intrauterine gestation. Heterogeneity within thickened endometrium consistent with missed . Patient had resolution of pain, nausea and very minimal vaginal bleeding. Rhogam not necessary due to patient's blood type being O positive. Patient was stable for discharge home with repeat sonogram outpatient with Dr Daily.

## 2019-01-31 NOTE — PROGRESS NOTE ADULT - SUBJECTIVE AND OBJECTIVE BOX
S: Patient was seen and examined at bedside. Patient states she is doing well with no pain currently. States that she has very minimal bleeding. Denies nausea, vomiting, headaches, dizziness.     O:   T(C): 37.2 (19 @ 10:24), Max: 37.4 (19 @ 01:42)  HR: 99 (19 @ 10:24) (79 - 102)  BP: 125/78 (19 @ 10:24) (109/91 - 127/72)  RR: 19 (19 @ 10:24) (18 - 20)  SpO2: 100% (19 @ 10:24) (98% - 100%)    Abdomen: soft, nontender, + BS   Ext: no edema, erythema or pain    A/P:   42yo  who presented with complaints of intermittent cramping lower abdominal pain x1 day. Found to have incomplete . S/p cytotec 400 mg x 1    -Patient can be discharged home and follow up with Dr Ceron for repeat sono.   -Currently has very limited bleeding and no pain.   -Blood type O positive. No rhogam necessary

## 2019-01-31 NOTE — DISCHARGE NOTE ADULT - PLAN OF CARE
Resolution Please follow up with Dr. Ceron for repeat sonogram to confirm expulsion of products of conception.

## 2019-02-04 DIAGNOSIS — O03.4 INCOMPLETE SPONTANEOUS ABORTION WITHOUT COMPLICATION: ICD-10-CM

## 2019-02-05 ENCOUNTER — APPOINTMENT (OUTPATIENT)
Dept: ANTEPARTUM | Facility: CLINIC | Age: 44
End: 2019-02-05

## 2019-05-01 ENCOUNTER — APPOINTMENT (OUTPATIENT)
Dept: OBGYN | Facility: CLINIC | Age: 44
End: 2019-05-01
Payer: COMMERCIAL

## 2019-05-01 VITALS
WEIGHT: 225 LBS | BODY MASS INDEX: 41.41 KG/M2 | DIASTOLIC BLOOD PRESSURE: 80 MMHG | SYSTOLIC BLOOD PRESSURE: 120 MMHG | TEMPERATURE: 98.3 F | HEIGHT: 62 IN

## 2019-05-01 PROCEDURE — 36415 COLL VENOUS BLD VENIPUNCTURE: CPT

## 2019-05-01 PROCEDURE — 99203 OFFICE O/P NEW LOW 30 MIN: CPT | Mod: 25

## 2019-05-01 PROCEDURE — 76830 TRANSVAGINAL US NON-OB: CPT

## 2019-05-01 NOTE — PROCEDURE
[Intrauterine Pregnancy] : intrauterine pregnancy [Yolk Sac] : yolk sac present [CRL: ___ (mm)] : CRL - [unfilled]Umm [Fetal Heart] : fetal heart present [___ day(s)] : [unfilled] days [Date: ___] : EDC: [unfilled] [Current GA by Sonogram: ___ (wks)] : Current GA by Sonogram: [unfilled]Uwks [WNL] : Transvaginal OB Sonogram WNL [FreeTextEntry1] : A transvaginal OB ultrasound was performed: A viable duran intrauterine gestation is seen, fetal heart motion is noted, crown-rump length consistent with 8 weeks gestation. This is consistent with the patient's dates

## 2019-05-01 NOTE — PHYSICAL EXAM
[No Bleeding] : there was no active vaginal bleeding [Normal] : uterus [Enlarged ___ wks] : enlarged [unfilled] ~Uweeks [Uterine Adnexae] : were not tender and not enlarged

## 2019-05-01 NOTE — HISTORY OF PRESENT ILLNESS
[Last Pap ___] : Last cervical pap smear was [unfilled] [Last Mammogram ___] : Last Mammogram was [unfilled] [Menarche Age: ____] : age at menarche was [unfilled]

## 2019-05-01 NOTE — CHIEF COMPLAINT
[Initial Visit] : initial GYN visit [FreeTextEntry1] : Patient is a 43-year-old female presents with complaints of amenorrhea. Patient's last menstrual period was March 7. This makes the patient 8 weeks pregnant. Patient had a positivehome pregnancy test

## 2019-05-04 LAB — PROGEST SERPL-MCNC: 21.7 NG/ML

## 2019-05-10 ENCOUNTER — APPOINTMENT (OUTPATIENT)
Age: 44
End: 2019-05-10

## 2019-05-10 ENCOUNTER — APPOINTMENT (OUTPATIENT)
Dept: MATERNAL FETAL MEDICINE | Facility: CLINIC | Age: 44
End: 2019-05-10

## 2019-05-14 ENCOUNTER — FORM ENCOUNTER (OUTPATIENT)
Age: 44
End: 2019-05-14

## 2019-05-15 ENCOUNTER — OUTPATIENT (OUTPATIENT)
Dept: OUTPATIENT SERVICES | Facility: HOSPITAL | Age: 44
LOS: 1 days | End: 2019-05-15
Payer: COMMERCIAL

## 2019-05-15 ENCOUNTER — APPOINTMENT (OUTPATIENT)
Dept: ULTRASOUND IMAGING | Facility: CLINIC | Age: 44
End: 2019-05-15
Payer: COMMERCIAL

## 2019-05-15 ENCOUNTER — APPOINTMENT (OUTPATIENT)
Dept: OBGYN | Facility: CLINIC | Age: 44
End: 2019-05-15
Payer: COMMERCIAL

## 2019-05-15 ENCOUNTER — NON-APPOINTMENT (OUTPATIENT)
Age: 44
End: 2019-05-15

## 2019-05-15 VITALS
HEIGHT: 62 IN | DIASTOLIC BLOOD PRESSURE: 76 MMHG | TEMPERATURE: 97.6 F | BODY MASS INDEX: 42.33 KG/M2 | HEART RATE: 78 BPM | WEIGHT: 230 LBS | OXYGEN SATURATION: 98 % | SYSTOLIC BLOOD PRESSURE: 120 MMHG | RESPIRATION RATE: 19 BRPM

## 2019-05-15 DIAGNOSIS — O34.219 MATERNAL CARE FOR UNSPECIFIED TYPE SCAR FROM PREVIOUS CESAREAN DELIVERY: Chronic | ICD-10-CM

## 2019-05-15 DIAGNOSIS — Z00.8 ENCOUNTER FOR OTHER GENERAL EXAMINATION: ICD-10-CM

## 2019-05-15 PROCEDURE — 81003 URINALYSIS AUTO W/O SCOPE: CPT | Mod: QW

## 2019-05-15 PROCEDURE — 76830 TRANSVAGINAL US NON-OB: CPT

## 2019-05-15 PROCEDURE — 76801 OB US < 14 WKS SINGLE FETUS: CPT | Mod: 26

## 2019-05-15 PROCEDURE — 99214 OFFICE O/P EST MOD 30 MIN: CPT | Mod: 25

## 2019-05-15 PROCEDURE — 76817 TRANSVAGINAL US OBSTETRIC: CPT

## 2019-05-15 PROCEDURE — 76817 TRANSVAGINAL US OBSTETRIC: CPT | Mod: 26

## 2019-05-15 PROCEDURE — 36415 COLL VENOUS BLD VENIPUNCTURE: CPT

## 2019-05-15 PROCEDURE — 76801 OB US < 14 WKS SINGLE FETUS: CPT

## 2019-05-15 NOTE — PHYSICAL EXAM
[No Bleeding] : there was no active vaginal bleeding [Enlarged ___ wks] : enlarged [unfilled] ~Uweeks [Normal] : uterus [Uterine Adnexae] : were not tender and not enlarged

## 2019-05-15 NOTE — CHIEF COMPLAINT
[Follow Up] : follow up GYN visit [FreeTextEntry1] : Patient is a 43-year-old female who is at 10 weeks gestation. Patient presents for prenatal care. Patient has complaints of not having any nausea or fatigue.

## 2019-05-15 NOTE — PROCEDURE
[Intrauterine Pregnancy] : intrauterine pregnancy [Fetal Heart] : no fetal heart [Yolk Sac] : yolk sac present [Current GA by Sonogram: ___ (wks)] : Current GA by Sonogram: [unfilled]Uwks [___ day(s)] : [unfilled] days [WNL] : Transvaginal OB Sonogram - abnormalities noted [FreeTextEntry1] : Transvaginal OB ultrasound performed: A nonviable duran fetus intrauterine is seen, no fetal heart motion is noted crown-rump length consistent with 8 weeks and one day gestation. This does not correlate with the patient dates

## 2019-05-16 ENCOUNTER — APPOINTMENT (OUTPATIENT)
Dept: OBGYN | Facility: CLINIC | Age: 44
End: 2019-05-16
Payer: COMMERCIAL

## 2019-05-16 PROCEDURE — 99214 OFFICE O/P EST MOD 30 MIN: CPT | Mod: 25

## 2019-05-16 PROCEDURE — 76830 TRANSVAGINAL US NON-OB: CPT

## 2019-05-16 NOTE — CHIEF COMPLAINT
[Follow Up] : follow up GYN visit [FreeTextEntry1] : Patient is a 43-year-old female who presents for a consultation to discuss her test results and treatment options. Patient was diagnosed with a missed  yesterday. Patient advised of her treatment options including medical therapy with Cytotec, D&C procedure, expected management. Risks benefits and alternatives reviewed with respect to all options. Patient has decided to go with the medical therapy with Cytotec. Again risks benefits alternatives reviewed including risk of bleeding requiring intervention, retained products of conception which required a D&C procedure and infection. Patient states that she understands and agrees with this procedure. Patient's blood type is O+. Patient will return to office tomorrow to have insertion of the Cytotec tablets.\par \par 40 minutes face time

## 2019-05-17 ENCOUNTER — APPOINTMENT (OUTPATIENT)
Dept: OBGYN | Facility: CLINIC | Age: 44
End: 2019-05-17
Payer: COMMERCIAL

## 2019-05-17 VITALS
HEIGHT: 62 IN | BODY MASS INDEX: 42.33 KG/M2 | SYSTOLIC BLOOD PRESSURE: 120 MMHG | DIASTOLIC BLOOD PRESSURE: 80 MMHG | WEIGHT: 230 LBS | TEMPERATURE: 98.2 F

## 2019-05-17 PROCEDURE — 99213 OFFICE O/P EST LOW 20 MIN: CPT | Mod: 25

## 2019-05-17 PROCEDURE — 76830 TRANSVAGINAL US NON-OB: CPT

## 2019-05-17 NOTE — CHIEF COMPLAINT
[Follow Up] : follow up GYN visit [FreeTextEntry1] : Patient is a 43-year-old female who presents for medical treatment of a missed  at 10 weeks gestation. Patient had confirmatory ultrasound at radiology and today diagnosing and confirming the missed . Today's sonogram again revealed a nonviable intrauterine gestation. Consent obtained and all material was benefits alternatives reviewed regarding Cytotec medical treatment of a missed . Instructions and precautions reviewed in detail. Patient's blood type is O+. Patient to followup in 5 days. Patient advised of potential failure of the procedure as well as the possibility of retained products of conception requiring a D&C procedure

## 2019-05-17 NOTE — PROCEDURE
[Fetal Heart] : no fetal heart [Intrauterine Pregnancy] : intrauterine pregnancy [Yolk Sac] : no yolk sac [WNL] : Transvaginal OB Sonogram - abnormalities noted [FreeTextEntry1] : Transvaginal OB ultrasound performed: A nonviable intrauterine gestation is noted, no fetal heart motion is seen in the fetus, crown-rump length consistent with 6 weeks and 4 days

## 2019-05-17 NOTE — COUNSELING
[Lab Results] : lab results [Pain Management] : pain management [Medication Management] : medication management

## 2019-05-20 LAB
ABO + RH PNL BLD: NORMAL
APPEARANCE: ABNORMAL
AR GENE MUT ANL BLD/T: NEGATIVE
B19V IGG SER QL IA: 6.8 INDEX
B19V IGG+IGM SER-IMP: NORMAL
B19V IGG+IGM SER-IMP: POSITIVE
B19V IGM FLD-ACNC: 0.2
B19V IGM SER-ACNC: NEGATIVE
BACTERIA UR CULT: NORMAL
BACTERIA: NEGATIVE
BASOPHILS # BLD AUTO: 0.03 K/UL
BASOPHILS NFR BLD AUTO: 0.5 %
BILIRUB UR QL STRIP: NORMAL
BILIRUBIN URINE: NEGATIVE
BLD GP AB SCN SERPL QL: NORMAL
BLOOD URINE: NEGATIVE
C TRACH RRNA SPEC QL NAA+PROBE: NOT DETECTED
CANDIDA VAG CYTO: NOT DETECTED
CFTR MUT TESTED BLD/T: NEGATIVE
CLARITY UR: CLEAR
CMV IGG SERPL QL: >10 U/ML
CMV IGG SERPL-IMP: POSITIVE
CMV IGM SERPL QL: <8 AU/ML
CMV IGM SERPL QL: NEGATIVE
COLLECTION METHOD: NORMAL
COLOR: YELLOW
EOSINOPHIL # BLD AUTO: 0.09 K/UL
EOSINOPHIL NFR BLD AUTO: 1.4 %
G VAGINALIS+PREV SP MTYP VAG QL MICRO: DETECTED
GLUCOSE QUALITATIVE U: NEGATIVE
GLUCOSE UR-MCNC: NORMAL
HBV SURFACE AG SER QL: NONREACTIVE
HCG UR QL: 0.2 EU/DL
HCT VFR BLD CALC: 40.9 %
HCV AB SER QL: NONREACTIVE
HCV S/CO RATIO: 0.11 S/CO
HGB A MFR BLD: 97.5 %
HGB A2 MFR BLD: 2.5 %
HGB BLD-MCNC: 12.8 G/DL
HGB FRACT BLD-IMP: NORMAL
HGB UR QL STRIP.AUTO: NORMAL
HIV1+2 AB SPEC QL IA.RAPID: NONREACTIVE
HYALINE CASTS: 2 /LPF
IMM GRANULOCYTES NFR BLD AUTO: 0.3 %
KETONES UR-MCNC: NORMAL
KETONES URINE: NEGATIVE
LEUKOCYTE ESTERASE UR QL STRIP: NORMAL
LEUKOCYTE ESTERASE URINE: NEGATIVE
LYMPHOCYTES # BLD AUTO: 1.98 K/UL
LYMPHOCYTES NFR BLD AUTO: 30.1 %
MAN DIFF?: NORMAL
MCHC RBC-ENTMCNC: 27.8 PG
MCHC RBC-ENTMCNC: 31.3 GM/DL
MCV RBC AUTO: 88.7 FL
MEV IGG FLD QL IA: >300 AU/ML
MEV IGG+IGM SER-IMP: POSITIVE
MEV IGM SER QL: NEGATIVE
MICROSCOPIC-UA: NORMAL
MONOCYTES # BLD AUTO: 0.47 K/UL
MONOCYTES NFR BLD AUTO: 7.1 %
N GONORRHOEA RRNA SPEC QL NAA+PROBE: NOT DETECTED
NEUTROPHILS # BLD AUTO: 3.99 K/UL
NEUTROPHILS NFR BLD AUTO: 60.6 %
NITRITE UR QL STRIP: NORMAL
NITRITE URINE: NEGATIVE
PH UR STRIP: 5.5
PH URINE: 6
PLATELET # BLD AUTO: 326 K/UL
PROT UR STRIP-MCNC: NORMAL
PROTEIN URINE: NORMAL
RBC # BLD: 4.61 M/UL
RBC # FLD: 17.9 %
RED BLOOD CELLS URINE: 2 /HPF
RUBV IGG FLD-ACNC: 17.3 INDEX
RUBV IGG SER-IMP: POSITIVE
RUBV IGM FLD-ACNC: <20 AU/ML
SOURCE TP AMPLIFICATION: NORMAL
SP GR UR STRIP: 1.02
SPECIFIC GRAVITY URINE: 1.02
SQUAMOUS EPITHELIAL CELLS: 13 /HPF
T GONDII AB SER-IMP: NEGATIVE
T GONDII AB SER-IMP: NEGATIVE
T GONDII IGG SER QL: <3 IU/ML
T GONDII IGM SER QL: <3 AU/ML
T PALLIDUM AB SER QL IA: NEGATIVE
T VAGINALIS VAG QL WET PREP: NOT DETECTED
T4 SERPL-MCNC: 9.4 UG/DL
TSH SERPL-ACNC: 4.06 UIU/ML
UROBILINOGEN URINE: NORMAL
VZV AB TITR SER: POSITIVE
VZV IGG SER IF-ACNC: 1522 INDEX
VZV IGM SER IF-ACNC: <0.91 INDEX
WBC # FLD AUTO: 6.58 K/UL
WHITE BLOOD CELLS URINE: 2 /HPF

## 2019-05-20 NOTE — ED ADULT NURSE NOTE - NS ED NURSE DISCH DISPOSITION
Consent: Written consent obtained and the risks of skin tag removal was reviewed with the patient including but not limited to bleeding, pigmentary change, infection, pain, and remote possibility of scarring. Add Associated Diagnoses If Applicable When Selecting Medical Necessity: Yes Detail Level: Simple Medical Necessity Information: It is in your best interest to select a reason for this procedure from the list below. All of these items fulfill various CMS LCD requirements except the new and changing color options. Anesthesia Type: 1% lidocaine with 1:100,000 epinephrine and a 1:6 solution of 8.4% sodium bicarbonate Anesthesia Volume In Cc: 0 Include Z78.9 (Other Specified Conditions Influencing Health Status) As An Associated Diagnosis?: No Medical Necessity Clause: This procedure was medically necessary because the lesions that were treated were: Discharged

## 2019-05-21 ENCOUNTER — OTHER (OUTPATIENT)
Age: 44
End: 2019-05-21

## 2019-05-21 ENCOUNTER — RESULT REVIEW (OUTPATIENT)
Age: 44
End: 2019-05-21

## 2019-05-21 ENCOUNTER — APPOINTMENT (OUTPATIENT)
Dept: OBGYN | Facility: CLINIC | Age: 44
End: 2019-05-21
Payer: COMMERCIAL

## 2019-05-21 VITALS
HEIGHT: 62 IN | TEMPERATURE: 99.4 F | SYSTOLIC BLOOD PRESSURE: 102 MMHG | WEIGHT: 228 LBS | RESPIRATION RATE: 16 BRPM | BODY MASS INDEX: 41.96 KG/M2 | DIASTOLIC BLOOD PRESSURE: 64 MMHG

## 2019-05-21 PROCEDURE — 76830 TRANSVAGINAL US NON-OB: CPT

## 2019-05-21 PROCEDURE — 99213 OFFICE O/P EST LOW 20 MIN: CPT | Mod: 25

## 2019-05-21 NOTE — PROCEDURE
[Transvaginal Ultrasound] : transvaginal ultrasound [FreeTextEntry3] : Transvaginal sonogram performed: No gestational sac is seen there is some heterogeneous changes in the endometrial cavity consistent with clot

## 2019-05-21 NOTE — PHYSICAL EXAM
[Normal] : uterus [No Bleeding] : there was no active vaginal bleeding [Uterine Adnexae] : were not tender and not enlarged [FreeTextEntry4] : a small amounand clots are in te vagina cervix is closed

## 2019-05-21 NOTE — CHIEF COMPLAINT
[Follow Up] : follow up GYN visit [FreeTextEntry1] : Patient is a 43-year-old female that is status post medical treatment with Cytotec for missed . Patient had the Cytotec inserted vaginally 4 days ago. Patient states that she had heavy bleeding with cramping and clots approximately 4 hours after the insertion. Patient states the bleeding is significantly diminished at this time. Patient's blood type is O+

## 2019-05-22 LAB — HPV HIGH+LOW RISK DNA PNL CVX: NOT DETECTED

## 2019-05-27 LAB — FMR1 GENE MUT ANL BLD/T: NORMAL

## 2019-06-12 ENCOUNTER — APPOINTMENT (OUTPATIENT)
Dept: OBGYN | Facility: CLINIC | Age: 44
End: 2019-06-12
Payer: COMMERCIAL

## 2019-06-12 ENCOUNTER — NON-APPOINTMENT (OUTPATIENT)
Age: 44
End: 2019-06-12

## 2019-06-12 ENCOUNTER — APPOINTMENT (OUTPATIENT)
Dept: FAMILY MEDICINE | Facility: CLINIC | Age: 44
End: 2019-06-12
Payer: COMMERCIAL

## 2019-06-12 ENCOUNTER — FORM ENCOUNTER (OUTPATIENT)
Age: 44
End: 2019-06-12

## 2019-06-12 VITALS
HEART RATE: 80 BPM | HEIGHT: 62 IN | DIASTOLIC BLOOD PRESSURE: 80 MMHG | BODY MASS INDEX: 41.77 KG/M2 | TEMPERATURE: 98.2 F | SYSTOLIC BLOOD PRESSURE: 114 MMHG | OXYGEN SATURATION: 98 % | RESPIRATION RATE: 15 BRPM | WEIGHT: 227 LBS

## 2019-06-12 VITALS
RESPIRATION RATE: 16 BRPM | WEIGHT: 227 LBS | DIASTOLIC BLOOD PRESSURE: 70 MMHG | BODY MASS INDEX: 41.77 KG/M2 | SYSTOLIC BLOOD PRESSURE: 110 MMHG | HEIGHT: 62 IN

## 2019-06-12 DIAGNOSIS — Z82.49 FAMILY HISTORY OF ISCHEMIC HEART DISEASE AND OTHER DISEASES OF THE CIRCULATORY SYSTEM: ICD-10-CM

## 2019-06-12 DIAGNOSIS — Z32.01 ENCOUNTER FOR PREGNANCY TEST, RESULT POSITIVE: ICD-10-CM

## 2019-06-12 DIAGNOSIS — Z83.3 FAMILY HISTORY OF DIABETES MELLITUS: ICD-10-CM

## 2019-06-12 DIAGNOSIS — Z34.01 ENCOUNTER FOR SUPERVISION OF NORMAL FIRST PREGNANCY, FIRST TRIMESTER: ICD-10-CM

## 2019-06-12 PROCEDURE — 36415 COLL VENOUS BLD VENIPUNCTURE: CPT

## 2019-06-12 PROCEDURE — 99213 OFFICE O/P EST LOW 20 MIN: CPT | Mod: 25

## 2019-06-12 PROCEDURE — G0444 DEPRESSION SCREEN ANNUAL: CPT

## 2019-06-12 PROCEDURE — 99386 PREV VISIT NEW AGE 40-64: CPT | Mod: 25

## 2019-06-12 PROCEDURE — 93000 ELECTROCARDIOGRAM COMPLETE: CPT

## 2019-06-12 PROCEDURE — 76830 TRANSVAGINAL US NON-OB: CPT

## 2019-06-12 RX ORDER — METRONIDAZOLE 7.5 MG/G
0.75 GEL VAGINAL
Qty: 1 | Refills: 0 | Status: DISCONTINUED | COMMUNITY
Start: 2019-05-20 | End: 2019-06-12

## 2019-06-12 RX ORDER — METHYLERGONOVINE MALEATE 0.2 MG/1
0.2 TABLET ORAL
Qty: 4 | Refills: 0 | Status: DISCONTINUED | COMMUNITY
Start: 2019-05-21 | End: 2019-06-12

## 2019-06-12 RX ORDER — CEFUROXIME AXETIL 500 MG/1
500 TABLET ORAL
Qty: 10 | Refills: 0 | Status: DISCONTINUED | COMMUNITY
Start: 2019-05-16 | End: 2019-06-12

## 2019-06-12 RX ORDER — MISOPROSTOL 200 UG/1
200 TABLET ORAL
Qty: 4 | Refills: 0 | Status: DISCONTINUED | COMMUNITY
Start: 2019-05-16 | End: 2019-06-12

## 2019-06-12 RX ORDER — PRENATAL VIT NO.126/IRON/FOLIC 28MG-0.8MG
28-0.8 TABLET ORAL
Refills: 0 | Status: DISCONTINUED | COMMUNITY
End: 2019-06-12

## 2019-06-12 NOTE — HEALTH RISK ASSESSMENT
[Good] : ~his/her~  mood as  good [No falls in past year] : Patient reported no falls in the past year [0] : 2) Feeling down, depressed, or hopeless: Not at all (0) [Patient reported mammogram was normal] : Patient reported mammogram was normal [Patient reported PAP Smear was normal] : Patient reported PAP Smear was normal [With Family] : lives with family [None] : None [Employed] : employed [Significant Other] : lives with significant other [Sexually Active] : sexually active [Feels Safe at Home] : Feels safe at home [Fully functional (bathing, dressing, toileting, transferring, walking, feeding)] : Fully functional (bathing, dressing, toileting, transferring, walking, feeding) [Fully functional (using the telephone, shopping, preparing meals, housekeeping, doing laundry, using] : Fully functional and needs no help or supervision to perform IADLs (using the telephone, shopping, preparing meals, housekeeping, doing laundry, using transportation, managing medications and managing finances) [Smoke Detector] : smoke detector [Seat Belt] :  uses seat belt [Carbon Monoxide Detector] : carbon monoxide detector [Name: ___] : Health Care Proxy's Name: [unfilled]  [Relationship: ___] : Relationship: [unfilled] [] : No [de-identified] : rare [[id number]] : 0 [Change in mental status noted] : No change in mental status noted [Language] : denies difficulty with language [Reasoning] : denies difficulty with reasoning [Behavior] : denies difficulty with behavior [Reports changes in hearing] : Reports no changes in hearing [High Risk Behavior] : no high risk behavior [Reports changes in dental health] : Reports no changes in dental health [Reports changes in vision] : Reports no changes in vision [PapSmearDate] : 5/15/19 [MammogramDate] : 2016 [Sunscreen] : does not use sunscreen [HIVDate] : 5/15/19 [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

## 2019-06-12 NOTE — HISTORY OF PRESENT ILLNESS
[FreeTextEntry1] : cpe  [de-identified] : Patient is here today for a physical. \par No major complaints, doing well.

## 2019-06-12 NOTE — ASSESSMENT
[FreeTextEntry1] : Health maintenance\par - comprehensive labs\par - depression screening negative - PHQ score 0\par - up to date with pap\par - due for mammo - script given\par - EKG - sinus rhythm, no acute ischemia

## 2019-06-12 NOTE — PROCEDURE
[Abnormal Uterine Bleeding] : abnormal uterine bleeding [Transvaginal Ultrasound] : transvaginal ultrasound [FreeTextEntry3] : Transvaginal pelvic sonogram performed: Uterus is top normal size, retroverted, questionable heterogeneous endometrium, rule out retained products of conception

## 2019-06-12 NOTE — CHIEF COMPLAINT
[FreeTextEntry1] : Patient is a 43-year-old female who presents for a three-week follow status post medical therapy for missed . Patient states she still having some staining. No other symptoms. Patient was treated with Methergine post Cytotec treatment for missed AB. [Follow Up] : follow up GYN visit

## 2019-06-12 NOTE — COUNSELING
[Healthy eating counseling provided] : healthy eating [Activity counseling provided] : activity [ - Annual Depression Screening] : Annual Depression Screening [Needs reinforcement, provided] : Patient needs reinforcement on understanding of disease, goals and obesity follow-up plan; reinforcement was provided [de-identified] : diet - working on improving diet \par exercise - minimal

## 2019-06-13 ENCOUNTER — APPOINTMENT (OUTPATIENT)
Dept: ULTRASOUND IMAGING | Facility: CLINIC | Age: 44
End: 2019-06-13
Payer: COMMERCIAL

## 2019-06-13 ENCOUNTER — OUTPATIENT (OUTPATIENT)
Dept: OUTPATIENT SERVICES | Facility: HOSPITAL | Age: 44
LOS: 1 days | End: 2019-06-13
Payer: COMMERCIAL

## 2019-06-13 DIAGNOSIS — Z00.8 ENCOUNTER FOR OTHER GENERAL EXAMINATION: ICD-10-CM

## 2019-06-13 DIAGNOSIS — O34.219 MATERNAL CARE FOR UNSPECIFIED TYPE SCAR FROM PREVIOUS CESAREAN DELIVERY: Chronic | ICD-10-CM

## 2019-06-13 LAB
25(OH)D3 SERPL-MCNC: 22.9 NG/ML
ALBUMIN SERPL ELPH-MCNC: 4.1 G/DL
ALP BLD-CCNC: 61 U/L
ALT SERPL-CCNC: 14 U/L
ANION GAP SERPL CALC-SCNC: 12 MMOL/L
APPEARANCE: CLEAR
AST SERPL-CCNC: 12 U/L
BASOPHILS # BLD AUTO: 0.02 K/UL
BASOPHILS NFR BLD AUTO: 0.4 %
BILIRUB SERPL-MCNC: 0.2 MG/DL
BILIRUBIN URINE: NEGATIVE
BLOOD URINE: NORMAL
BUN SERPL-MCNC: 8 MG/DL
CALCIUM SERPL-MCNC: 9.5 MG/DL
CHLORIDE SERPL-SCNC: 105 MMOL/L
CHOLEST SERPL-MCNC: 170 MG/DL
CHOLEST/HDLC SERPL: 3.5 RATIO
CO2 SERPL-SCNC: 22 MMOL/L
COLOR: NORMAL
CREAT SERPL-MCNC: 0.62 MG/DL
EOSINOPHIL # BLD AUTO: 0.1 K/UL
EOSINOPHIL NFR BLD AUTO: 1.9 %
ESTIMATED AVERAGE GLUCOSE: 126 MG/DL
FOLATE SERPL-MCNC: 13.6 NG/ML
GLUCOSE QUALITATIVE U: NEGATIVE
GLUCOSE SERPL-MCNC: 102 MG/DL
HBA1C MFR BLD HPLC: 6 %
HCT VFR BLD CALC: 39.5 %
HDLC SERPL-MCNC: 49 MG/DL
HGB BLD-MCNC: 12.6 G/DL
IMM GRANULOCYTES NFR BLD AUTO: 0 %
KETONES URINE: NEGATIVE
LDLC SERPL CALC-MCNC: 109 MG/DL
LEUKOCYTE ESTERASE URINE: NEGATIVE
LYMPHOCYTES # BLD AUTO: 1.65 K/UL
LYMPHOCYTES NFR BLD AUTO: 31.7 %
MAN DIFF?: NORMAL
MCHC RBC-ENTMCNC: 27.4 PG
MCHC RBC-ENTMCNC: 31.9 GM/DL
MCV RBC AUTO: 85.9 FL
MONOCYTES # BLD AUTO: 0.45 K/UL
MONOCYTES NFR BLD AUTO: 8.6 %
NEUTROPHILS # BLD AUTO: 2.99 K/UL
NEUTROPHILS NFR BLD AUTO: 57.4 %
NITRITE URINE: NEGATIVE
PH URINE: 6
PLATELET # BLD AUTO: 272 K/UL
POTASSIUM SERPL-SCNC: 4.4 MMOL/L
PROT SERPL-MCNC: 7.3 G/DL
PROTEIN URINE: NEGATIVE
RBC # BLD: 4.6 M/UL
RBC # FLD: 16.2 %
SODIUM SERPL-SCNC: 139 MMOL/L
SPECIFIC GRAVITY URINE: 1.02
T4 FREE SERPL-MCNC: 0.9 NG/DL
TRIGL SERPL-MCNC: 61 MG/DL
TSH SERPL-ACNC: 1.98 UIU/ML
UROBILINOGEN URINE: NORMAL
VIT B12 SERPL-MCNC: 792 PG/ML
WBC # FLD AUTO: 5.21 K/UL

## 2019-06-13 PROCEDURE — 76830 TRANSVAGINAL US NON-OB: CPT

## 2019-06-13 PROCEDURE — 76856 US EXAM PELVIC COMPLETE: CPT

## 2019-06-13 PROCEDURE — 76856 US EXAM PELVIC COMPLETE: CPT | Mod: 26

## 2019-06-13 PROCEDURE — 76830 TRANSVAGINAL US NON-OB: CPT | Mod: 26

## 2019-06-15 LAB — BACTERIA GENITAL AEROBE CULT: NORMAL

## 2019-06-17 ENCOUNTER — FORM ENCOUNTER (OUTPATIENT)
Age: 44
End: 2019-06-17

## 2019-06-17 ENCOUNTER — RESULT REVIEW (OUTPATIENT)
Age: 44
End: 2019-06-17

## 2019-06-18 ENCOUNTER — OUTPATIENT (OUTPATIENT)
Dept: OUTPATIENT SERVICES | Facility: HOSPITAL | Age: 44
LOS: 1 days | End: 2019-06-18
Payer: COMMERCIAL

## 2019-06-18 ENCOUNTER — APPOINTMENT (OUTPATIENT)
Dept: MAMMOGRAPHY | Facility: CLINIC | Age: 44
End: 2019-06-18
Payer: COMMERCIAL

## 2019-06-18 DIAGNOSIS — Z12.31 ENCOUNTER FOR SCREENING MAMMOGRAM FOR MALIGNANT NEOPLASM OF BREAST: ICD-10-CM

## 2019-06-18 DIAGNOSIS — O34.219 MATERNAL CARE FOR UNSPECIFIED TYPE SCAR FROM PREVIOUS CESAREAN DELIVERY: Chronic | ICD-10-CM

## 2019-06-18 PROCEDURE — 77063 BREAST TOMOSYNTHESIS BI: CPT | Mod: 26

## 2019-06-18 PROCEDURE — 77067 SCR MAMMO BI INCL CAD: CPT

## 2019-06-18 PROCEDURE — 77063 BREAST TOMOSYNTHESIS BI: CPT

## 2019-06-18 PROCEDURE — 77067 SCR MAMMO BI INCL CAD: CPT | Mod: 26

## 2019-07-01 ENCOUNTER — APPOINTMENT (OUTPATIENT)
Dept: FAMILY MEDICINE | Facility: CLINIC | Age: 44
End: 2019-07-01
Payer: COMMERCIAL

## 2019-07-01 VITALS
OXYGEN SATURATION: 98 % | RESPIRATION RATE: 16 BRPM | HEART RATE: 90 BPM | DIASTOLIC BLOOD PRESSURE: 72 MMHG | TEMPERATURE: 98 F | BODY MASS INDEX: 41.77 KG/M2 | WEIGHT: 227 LBS | SYSTOLIC BLOOD PRESSURE: 114 MMHG | HEIGHT: 62 IN

## 2019-07-01 PROCEDURE — 99213 OFFICE O/P EST LOW 20 MIN: CPT

## 2019-07-01 NOTE — HISTORY OF PRESENT ILLNESS
[FreeTextEntry1] : follow up  [de-identified] : Patient is here today for an acute visit for abdominal pain. \par She has had the pain / discomfort for a few months. \par The pain is intermittent.  Located in the upper center of the abdomen.  When she presses the area she feels that there is a hard bulge at times and at other times it goes away.  \par The pain is increased when she eats, she feels more pressure.  \par She feels the symptoms a few times per week. She is still able to eat and drink.  Normal bowel movements.

## 2019-07-01 NOTE — REVIEW OF SYSTEMS
[Abdominal Pain] : abdominal pain [Negative] : Neurological [Nausea] : no nausea [Constipation] : no constipation [Diarrhea] : diarrhea [Vomiting] : no vomiting [FreeTextEntry7] : discomfort in upper abdomen/ epigastric area

## 2019-07-01 NOTE — ASSESSMENT
[FreeTextEntry1] : Abdominal pain\par - mostly discomfort, upper abdomen\par - ? ventral hernia\par - check abdominal US

## 2019-07-01 NOTE — PHYSICAL EXAM
[No Acute Distress] : no acute distress [Well Nourished] : well nourished [Well Developed] : well developed [Normal Sclera/Conjunctiva] : normal sclera/conjunctiva [PERRL] : pupils equal round and reactive to light [Normal Outer Ear/Nose] : the outer ears and nose were normal in appearance [Normal Oropharynx] : the oropharynx was normal [Supple] : supple [No Respiratory Distress] : no respiratory distress  [Clear to Auscultation] : lungs were clear to auscultation bilaterally [No Accessory Muscle Use] : no accessory muscle use [Normal Rate] : normal rate  [Regular Rhythm] : with a regular rhythm [Normal S1, S2] : normal S1 and S2 [Soft] : abdomen soft [Non Tender] : non-tender [Non-distended] : non-distended [Normal Bowel Sounds] : normal bowel sounds [Normal Anterior Cervical Nodes] : no anterior cervical lymphadenopathy [Grossly Normal Strength/Tone] : grossly normal strength/tone [No Rash] : no rash [Normal Gait] : normal gait [No Focal Deficits] : no focal deficits [Normal Affect] : the affect was normal [Normal Insight/Judgement] : insight and judgment were intact [de-identified] : hardened area in epigastric region, ? ventral hernia

## 2019-07-05 ENCOUNTER — OUTPATIENT (OUTPATIENT)
Dept: OUTPATIENT SERVICES | Facility: HOSPITAL | Age: 44
LOS: 1 days | End: 2019-07-05

## 2019-07-05 ENCOUNTER — APPOINTMENT (OUTPATIENT)
Dept: ULTRASOUND IMAGING | Facility: CLINIC | Age: 44
End: 2019-07-05
Payer: COMMERCIAL

## 2019-07-05 DIAGNOSIS — R10.9 UNSPECIFIED ABDOMINAL PAIN: ICD-10-CM

## 2019-07-05 DIAGNOSIS — O34.219 MATERNAL CARE FOR UNSPECIFIED TYPE SCAR FROM PREVIOUS CESAREAN DELIVERY: Chronic | ICD-10-CM

## 2019-07-05 PROCEDURE — 76705 ECHO EXAM OF ABDOMEN: CPT | Mod: 26

## 2019-11-27 ENCOUNTER — APPOINTMENT (OUTPATIENT)
Dept: BARIATRICS | Facility: CLINIC | Age: 44
End: 2019-11-27
Payer: COMMERCIAL

## 2019-11-27 VITALS
DIASTOLIC BLOOD PRESSURE: 80 MMHG | HEART RATE: 78 BPM | BODY MASS INDEX: 43.43 KG/M2 | OXYGEN SATURATION: 96 % | WEIGHT: 230 LBS | SYSTOLIC BLOOD PRESSURE: 126 MMHG | HEIGHT: 61 IN

## 2019-11-27 DIAGNOSIS — O02.1 MISSED ABORTION: ICD-10-CM

## 2019-11-27 DIAGNOSIS — Z87.42 PERSONAL HISTORY OF OTHER DISEASES OF THE FEMALE GENITAL TRACT: ICD-10-CM

## 2019-11-27 PROCEDURE — 99205 OFFICE O/P NEW HI 60 MIN: CPT

## 2019-11-27 NOTE — ASSESSMENT
[FreeTextEntry1] : This patient presents today with a BMI of 43.46 kg/m2 and co-morbid conditions including no known comorbid conditions.  \par \par Today we have discussed the Risks, Benefits, and Alternatives to surgical intervention including Laparoscopic Adjustable Gastric Band, Laparoscopic Bernadine-En-Y Gastric Bypass as well as Laparoscopic Sleeve Gastrectomy.  All questions have been answered.  Collectively we have determined this patient to be best suited for Laparoscopic Sleeve Gastrectomy.\par \par Risk, Benefits, and Alternatives to surgery have been discussed.  This includes but is not limited to bleeding, infection, damage to adjacent structures, need for additional surgery or interventions, adverse effects of anesthesia such as cardio-respiratory complications, prolonged intubation, cardiac arrhythmia, arrest, and or death.  Risks of forgoing surgery have also been discussed including progression of, and/or worsening of current condition which may then require urgent or emergent treatment or surgery.\par \par This process is quite extensive requiring the patient to undergo preoperative assessments including evaluation and documented weight history by his/her  Primary Care Physician, Evaluation and treatment by a  Psychiatrist, Nutritionist, Cardiologist, Pulmonologist.  Preoperative upper endoscopy, abdominal ultrasonography and bilateral lower extremity venous duplex will be performed as well. In addition, 3-6 consecutive months of medically managed preoperative weight loss counseling will also be required. Throughout this process I anticipate and would expect an EBL (Excess Body Weight Loss) between 10 and 15% prior to surgery.\par \par Nutritional counseling has been provided. The patient is encouraged to remain calorie conscious and continue a low fat, low carbohydrate, high protein diet. Also, emphasized has been placed on the importance of adequate hydration and multi-vitamin supplementation.  (15 min)\par \par Pending preoperative workup and clearance is in accordance with NIH criteria, this patient will be scheduled electively for Laparoscopic Sleeve Gastrectomy.\par \par Preoperative protocols and referrals have been reviewed at length with the patient and all questions have been answered. Followup in one to 2 months to reassess weight loss progression and facilitate any additional preoperative clearances.\par

## 2019-11-27 NOTE — HISTORY OF PRESENT ILLNESS
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 Despite multiple efforts at diet and exercise this patient has been unable to achieve and/or maintain significant weight loss.

## 2019-11-27 NOTE — CONSULT LETTER
[Dear  ___] : Dear  [unfilled], [Courtesy Letter:] : I had the pleasure of seeing your patient, [unfilled], in my office today. [Please see my note below.] : Please see my note below. [Consult Closing:] : Thank you very much for allowing me to participate in the care of this patient.  If you have any questions, please do not hesitate to contact me. [Sincerely,] : Sincerely, [FreeTextEntry3] : Jan Scott MD, FACS, FASMBS\par Chair, Department of Surgery @ [facility]\par Advanced Laparoscopic, General & Bariatric Surgery\par Assistant Clinical Professor of Surgery - [employer] at [facility]\par Department of Surgery\par [facility] @ [place]\par [address]\[place] [address]\par Tel: [phone]\par Fax: [phone]\par Email: [email or web address] \par [facility]\par Visit us at [facility] [email or web address]

## 2019-12-19 ENCOUNTER — APPOINTMENT (OUTPATIENT)
Dept: BARIATRICS/WEIGHT MGMT | Facility: CLINIC | Age: 44
End: 2019-12-19
Payer: COMMERCIAL

## 2019-12-19 VITALS — HEIGHT: 61 IN | BODY MASS INDEX: 43.2 KG/M2 | WEIGHT: 228.84 LBS

## 2019-12-19 PROCEDURE — 90791 PSYCH DIAGNOSTIC EVALUATION: CPT

## 2019-12-20 ENCOUNTER — APPOINTMENT (OUTPATIENT)
Dept: FAMILY MEDICINE | Facility: CLINIC | Age: 44
End: 2019-12-20
Payer: COMMERCIAL

## 2019-12-20 VITALS
RESPIRATION RATE: 16 BRPM | OXYGEN SATURATION: 98 % | BODY MASS INDEX: 42.86 KG/M2 | DIASTOLIC BLOOD PRESSURE: 68 MMHG | SYSTOLIC BLOOD PRESSURE: 102 MMHG | HEART RATE: 95 BPM | TEMPERATURE: 99.3 F | WEIGHT: 227 LBS | HEIGHT: 61 IN

## 2019-12-20 DIAGNOSIS — N76.0 ACUTE VAGINITIS: ICD-10-CM

## 2019-12-20 DIAGNOSIS — B37.9 CANDIDIASIS, UNSPECIFIED: ICD-10-CM

## 2019-12-20 DIAGNOSIS — B96.89 ACUTE VAGINITIS: ICD-10-CM

## 2019-12-20 PROCEDURE — 99213 OFFICE O/P EST LOW 20 MIN: CPT

## 2019-12-20 NOTE — HISTORY OF PRESENT ILLNESS
[FreeTextEntry1] : follow up [de-identified] : Patient is here today for follow up.\par She is planning to pursue bariatric surgery.\par She saw Dr. Scott on November 27, 2019 for an initial evaluation. \par She did her psych evaluation yesterday.\par She has an appointment on 12/26 for a weight in.  \par She saw the cardiologist earlier this month for ECHO. \par She is going for GI evaluation in January.\par She will also be going for her sleep study. \par She is working on diet and exercise to lose weight on her own leading up to surgery.  Will be meeting with nutrition. She has cut out junk food and she is starting with protein shakes.  She is increasing water.  She is walking more.  Cutting back on carbs.\par

## 2019-12-20 NOTE — ASSESSMENT
[FreeTextEntry1] : Obesity\par BMI 40-44.9\par Prediabetes\par - patient planning for weight loss surgery\par - going for blood work as prescribed by Dr. Scott\par - started to go to all specialist appts\par - working on diet and exercise modification\par - needs letter of support for surgery - given today

## 2019-12-20 NOTE — PHYSICAL EXAM
[No Acute Distress] : no acute distress [Well Nourished] : well nourished [Well Developed] : well developed [Well-Appearing] : well-appearing [Normal Sclera/Conjunctiva] : normal sclera/conjunctiva [PERRL] : pupils equal round and reactive to light [Normal Outer Ear/Nose] : the outer ears and nose were normal in appearance [Normal Oropharynx] : the oropharynx was normal [No Lymphadenopathy] : no lymphadenopathy [Supple] : supple [No Respiratory Distress] : no respiratory distress  [No Accessory Muscle Use] : no accessory muscle use [Normal Rate] : normal rate  [Clear to Auscultation] : lungs were clear to auscultation bilaterally [Regular Rhythm] : with a regular rhythm [Normal S1, S2] : normal S1 and S2 [No Murmur] : no murmur heard [No Edema] : there was no peripheral edema [Soft] : abdomen soft [Non Tender] : non-tender [Non-distended] : non-distended [No Masses] : no abdominal mass palpated [No HSM] : no HSM [Normal Bowel Sounds] : normal bowel sounds [No Spinal Tenderness] : no spinal tenderness [Grossly Normal Strength/Tone] : grossly normal strength/tone [No Rash] : no rash [No Focal Deficits] : no focal deficits [Normal Gait] : normal gait [Normal Affect] : the affect was normal [Normal Insight/Judgement] : insight and judgment were intact [de-identified] : obesity

## 2019-12-26 ENCOUNTER — APPOINTMENT (OUTPATIENT)
Dept: BARIATRICS | Facility: CLINIC | Age: 44
End: 2019-12-26
Payer: COMMERCIAL

## 2019-12-26 VITALS
HEART RATE: 91 BPM | SYSTOLIC BLOOD PRESSURE: 106 MMHG | BODY MASS INDEX: 43.41 KG/M2 | WEIGHT: 229.94 LBS | HEIGHT: 61 IN | OXYGEN SATURATION: 99 % | DIASTOLIC BLOOD PRESSURE: 75 MMHG

## 2019-12-26 PROCEDURE — 99214 OFFICE O/P EST MOD 30 MIN: CPT

## 2019-12-26 NOTE — ASSESSMENT
[FreeTextEntry1] : 44 year old F undergoing workup for laparoscopic sleeve gastrectomy here for WEIGH IN VISIT. Weight loss since last visit.  Patient is currently in the process of completing her workup as well as a medically supervised diet.\par \par Pre op education classes completed. \par Nutrition one on one 2/5/2020 Psych -12/19/2019 \par 2 additional weigh in visits prior to surgery. January 2020 and February 2020. Patient is aware she needs to lose weight prior to surgery. \par Upper EGD to be scheduled. \par Needs letter of support/ diet history / routine labs performed - vitamin levels pending. \par Appointments  and testing with cardiology- Echo/ stress testing completed  /pulmonary scheduled.\par \par \par Nutritional counseling has been provided. The patient is encouraged to remain calorie conscious and continue a low fat, low carbohydrate, protein focus diet. Pt encouraged to participate in a daily exercise regimen incorporating cardio and strength training. \par \par Return to office in 2 weeks or earlier if any concerns. \par

## 2019-12-26 NOTE — PHYSICAL EXAM
[Obese, well nourished, in no acute distress] : obese, well nourished, in no acute distress [Normal] : affect appropriate [de-identified] : normoactive bowel sounds, soft and non tender, no hepatosplenomegaly or masses appreciated.

## 2019-12-26 NOTE — HISTORY OF PRESENT ILLNESS
[de-identified] : 44 year old F undergoing workup for laparoscopic sleeve gastrectomy here for WEIGH IN VISIT. Weight loss since last visit.  Patient is currently in the process of completing her workup as well as a medically supervised diet. Patient continues to make efforts to improve food choices and increased activity.Pt is following a protein focused diet - 3 meals a day - consuming a sufficient amount of zero calorie liquid.  Patient knows she needs to lose weight prior to surgery.Exercising regularly as recommended.All questions were answered.Discussed and reviewed further requirements needed prior to scheduling surgery. Discussed recent labs with patient - vitamin levels still pending. \par

## 2019-12-26 NOTE — REVIEW OF SYSTEMS
[Recent Change In Weight] : ~T recent weight change [Negative] : Endocrine [Fever] : no fever [Chills] : no chills [Chest Pain] : no chest pain [Dysphagia] : no dysphagia [Lower Ext Edema] : no lower extremity edema [Shortness Of Breath] : no shortness of breath [Wheezing] : no wheezing [Cough] : no cough [SOB on Exertion] : no shortness of breath during exertion [Abdominal Pain] : no abdominal pain [Vomiting] : no vomiting [Constipation] : no constipation [Diarrhea] : no diarrhea [Reflux/Heartburn] : no reflux/ heartburn [FreeTextEntry2] : weight loss since last visit.

## 2020-01-10 ENCOUNTER — APPOINTMENT (OUTPATIENT)
Dept: PULMONOLOGY | Facility: CLINIC | Age: 45
End: 2020-01-10
Payer: COMMERCIAL

## 2020-01-10 ENCOUNTER — CLINICAL ADVICE (OUTPATIENT)
Age: 45
End: 2020-01-10

## 2020-01-10 VITALS
WEIGHT: 226 LBS | OXYGEN SATURATION: 98 % | BODY MASS INDEX: 42.12 KG/M2 | DIASTOLIC BLOOD PRESSURE: 80 MMHG | HEIGHT: 61.25 IN | SYSTOLIC BLOOD PRESSURE: 120 MMHG | HEART RATE: 89 BPM

## 2020-01-10 VITALS — BODY MASS INDEX: 41.96 KG/M2 | WEIGHT: 228 LBS | HEIGHT: 62 IN

## 2020-01-10 DIAGNOSIS — Z00.00 ENCOUNTER FOR GENERAL ADULT MEDICAL EXAMINATION W/OUT ABNORMAL FINDINGS: ICD-10-CM

## 2020-01-10 PROCEDURE — 85018 HEMOGLOBIN: CPT | Mod: QW

## 2020-01-10 PROCEDURE — 94010 BREATHING CAPACITY TEST: CPT

## 2020-01-10 PROCEDURE — 94727 GAS DIL/WSHOT DETER LNG VOL: CPT

## 2020-01-10 PROCEDURE — 94729 DIFFUSING CAPACITY: CPT

## 2020-01-10 PROCEDURE — 99204 OFFICE O/P NEW MOD 45 MIN: CPT | Mod: 25

## 2020-01-10 NOTE — PHYSICAL EXAM
[General Appearance - In No Acute Distress] : no acute distress [General Appearance - Well Developed] : well developed [Normal Appearance] : normal appearance [Low Lying Soft Palate] : low lying soft palate [Normal Conjunctiva] : the conjunctiva exhibited no abnormalities [Elongated Uvula] : elongated uvula [Enlarged Base of the Tongue] : enlargement of the base of the tongue [IV] : IV [Neck Appearance] : the appearance of the neck was normal [Heart Rate And Rhythm] : heart rate and rhythm were normal [Heart Sounds] : normal S1 and S2 [Edema] : no peripheral edema present [Murmurs] : no murmurs present [] : no respiratory distress [Exaggerated Use Of Accessory Muscles For Inspiration] : no accessory muscle use [Respiration, Rhythm And Depth] : normal respiratory rhythm and effort [Auscultation Breath Sounds / Voice Sounds] : lungs were clear to auscultation bilaterally [Abdomen Tenderness] : non-tender [Abdomen Soft] : soft [Abnormal Walk] : normal gait [Cyanosis, Localized] : no localized cyanosis [Nail Clubbing] : no clubbing of the fingernails [No Focal Deficits] : no focal deficits [Oriented To Time, Place, And Person] : oriented to person, place, and time [FreeTextEntry1] : Severe oropharyngeal crowding.

## 2020-01-10 NOTE — CONSULT LETTER
[Dear  ___] : Dear  [unfilled], [Consult Letter:] : I had the pleasure of evaluating your patient, [unfilled]. [Please see my note below.] : Please see my note below. [Consult Closing:] : Thank you very much for allowing me to participate in the care of this patient.  If you have any questions, please do not hesitate to contact me. [Sincerely,] : Sincerely, [DrBen  ___] : Dr. HERNANDEZ [FreeTextEntry3] : Jose Arredondo MD, FCCP, D. ABSM\par Pulmonary and Sleep Medicine\par [facility] at [place]

## 2020-01-10 NOTE — HISTORY OF PRESENT ILLNESS
[Excessive Daytime Sleepiness] : excessive daytime sleepiness [Snoring] : snoring [Sleepy When Sedentary] : sleepy when sedentary [None] : No associated symptoms are reported [Initial Evaluation] : an initial evaluation of [Currently Experiencing] : The patient is currently experiencing symptoms. [Excess Weight] : excess weight [Dyspnea] : dyspnea [Back Pain] : back pain [FreeTextEntry1] : Patient c/o SOBOE but is otherwise without associated respiratory complaints.\par  [Unrefreshing Sleep] : no unrefreshing sleep [Witnessed Apnea During Sleep] : no witnessed apnea during sleep [Witnessed Gasping During Sleep] : no witnessed gasping during sleep

## 2020-01-10 NOTE — REVIEW OF SYSTEMS
[As Noted in HPI] : as noted in HPI [Fever] : no fever [Eye Irritation] : no ~T irritation of the eyes [Chills] : no chills [Dry Eyes] : no dryness of the eyes [Epistaxis] : no nosebleeds [Nasal Congestion] : no nasal congestion [Sinus Problems] : no sinus problems [Postnasal Drip] : no postnasal drip [Cough] : no cough [Sputum] : not coughing up ~M sputum [Dyspnea] : no dyspnea [Chest Tightness] : no chest tightness [Wheezing] : no wheezing [Pleuritic Pain] : no pleuritic pain [Hypertension] : no ~T hypertension [Chest Discomfort] : no chest discomfort [Murmurs] : no murmurs were heard [Dysrhythmia] : no dysrhythmia [Edema] : ~T edema was not present [Palpitations] : no palpitations [Hay Fever] : no hay fever [Itchy Eyes] : no itching of ~T the eyes [Nausea] : no nausea [Reflux] : no reflux [Vomiting] : no vomiting [Diarrhea] : no diarrhea [Constipation] : no constipation [Dysuria] : no dysuria [Abdominal Pain] : no abdominal pain [Anemia] : no anemia [Trauma] : no ~T physical trauma [Fracture] : no fracture [Headache] : no headache [Dizziness] : no dizziness [Numbness] : no numbness [Paralysis] : no paralysis was seen [Syncope] : no fainting [Depression] : no depression [Seizures] : no seizures [Diabetes] : no diabetes mellitus [Thyroid Problem] : no thyroid problem [Anxiety] : no anxiety

## 2020-01-10 NOTE — DISCUSSION/SUMMARY
Progress Notes by Josiah Patel DO at 06/13/18 06:56 PM     Author:  Josiah Patel DO Service:  (none) Author Type:  Physician     Filed:  06/13/18 06:56 PM Encounter Date:  6/13/2018 Status:  Signed     :  Josiah Patel DO (Physician)            Pre-op testing reviewed, acceptable to proceed. Josiah Jennings DO    [MN1.1M]    Revision History        User Key Date/Time User Provider Type Action    > MN1.1 06/13/18 06:56 PM Josiah Patel DO Physician Sign    M - Manual            
[FreeTextEntry1] : \par #1. PFTs performed today reveal mild restriction likely related to morbid obesity\par #2. The patient does not appear to require chronic BD therapy at this time\par #3. SOBOE is likely related to weight or deconditioning given restriction on PFTs\par #4. Diet and exercise for weight loss\par #5. S/N PSG to evaluate for possible FABRICIO\par #6. Preop CXR\par #7. Further pre-op recommendations will be forthcoming pending the above w/u\par #8. F/u after PSG and CXR\par

## 2020-01-10 NOTE — REASON FOR VISIT
[Initial Evaluation] : an initial evaluation [Sleep Apnea] : sleep apnea [Shortness of Breath] : shortness of Breath [FreeTextEntry2] : morbid obesity, clearance

## 2020-01-21 ENCOUNTER — APPOINTMENT (OUTPATIENT)
Dept: BARIATRICS | Facility: CLINIC | Age: 45
End: 2020-01-21
Payer: COMMERCIAL

## 2020-01-21 VITALS
DIASTOLIC BLOOD PRESSURE: 68 MMHG | WEIGHT: 230.16 LBS | HEART RATE: 80 BPM | SYSTOLIC BLOOD PRESSURE: 112 MMHG | BODY MASS INDEX: 43.45 KG/M2 | OXYGEN SATURATION: 100 % | HEIGHT: 61 IN

## 2020-01-21 PROCEDURE — 99214 OFFICE O/P EST MOD 30 MIN: CPT

## 2020-01-27 NOTE — PHYSICAL EXAM
[Obese, well nourished, in no acute distress] : obese, well nourished, in no acute distress [Normal] : affect appropriate [de-identified] : normoactive bowel sounds, soft and non tender, no hepatosplenomegaly or masses appreciated.

## 2020-01-27 NOTE — HISTORY OF PRESENT ILLNESS
[de-identified] : 44 year old F undergoing workup for laparoscopic sleeve gastrectomy here for WEIGH IN VISIT. Weight stable  since last visit.  Patient is currently in the process of completing her workup as well as a medically supervised diet. Patient continues to make efforts to improve food choices and increased activity.Pt is following a protein focused diet - 3 meals a day - consuming a sufficient amount of zero calorie liquid.  Patient knows she needs to lose weight prior to surgery.Exercising regularly as recommended.All questions were answered.Discussed and reviewed further requirements needed prior to scheduling surgery. Discussed recent labs with patient .\par

## 2020-01-27 NOTE — REVIEW OF SYSTEMS
[Negative] : Endocrine [Fever] : no fever [Chills] : no chills [Recent Change In Weight] : ~T no recent weight change [Dysphagia] : no dysphagia [Chest Pain] : no chest pain [Lower Ext Edema] : no lower extremity edema [Shortness Of Breath] : no shortness of breath [Wheezing] : no wheezing [Cough] : no cough [SOB on Exertion] : no shortness of breath during exertion [Abdominal Pain] : no abdominal pain [Vomiting] : no vomiting [Constipation] : no constipation [Diarrhea] : no diarrhea [Reflux/Heartburn] : no reflux/ heartburn [FreeTextEntry2] : weight stable since last visit.

## 2020-01-27 NOTE — ASSESSMENT
[FreeTextEntry1] : 44 year old F undergoing workup for laparoscopic sleeve gastrectomy here for WEIGH IN VISIT. Weight loss since last visit.  Patient is currently in the process of completing her workup as well as a medically supervised diet.\par \par Pre op education classes completed. \par Nutrition one on one 2/5/2020 Psych - completed on 12/19/2019 \par 1 additional weigh in visits prior to surgery.February 2020. Patient is aware she needs to lose weight prior to surgery. \par Upper EGD to be scheduled. GI consult - 1/31/2020 - will schedule upper EGD after consult visit. \par Needs letter of support/ diet history / routine labs performed - discussed and reviewed results with patient. \par Appointments  and testing with cardiology- Echo/ stress testing completed  /pulmonary - will need sleep study - plan to schedule will receive final clearance after study and and Chest Xray . \par \par \par Nutritional counseling has been provided. The patient is encouraged to remain calorie conscious and continue a low fat, low carbohydrate, protein focus diet. Pt encouraged to participate in a daily exercise regimen incorporating cardio and strength training. \par \par Return to office in 2 weeks or earlier if any concerns. \par

## 2020-01-29 ENCOUNTER — APPOINTMENT (OUTPATIENT)
Dept: OBGYN | Facility: CLINIC | Age: 45
End: 2020-01-29

## 2020-01-31 ENCOUNTER — APPOINTMENT (OUTPATIENT)
Dept: GASTROENTEROLOGY | Facility: CLINIC | Age: 45
End: 2020-01-31
Payer: COMMERCIAL

## 2020-01-31 ENCOUNTER — OUTPATIENT (OUTPATIENT)
Dept: OUTPATIENT SERVICES | Facility: HOSPITAL | Age: 45
LOS: 1 days | End: 2020-01-31
Payer: COMMERCIAL

## 2020-01-31 VITALS
RESPIRATION RATE: 16 BRPM | OXYGEN SATURATION: 98 % | HEIGHT: 61 IN | HEART RATE: 76 BPM | BODY MASS INDEX: 43.43 KG/M2 | DIASTOLIC BLOOD PRESSURE: 88 MMHG | WEIGHT: 230 LBS | SYSTOLIC BLOOD PRESSURE: 134 MMHG

## 2020-01-31 DIAGNOSIS — G47.33 OBSTRUCTIVE SLEEP APNEA (ADULT) (PEDIATRIC): ICD-10-CM

## 2020-01-31 DIAGNOSIS — M54.32 SCIATICA, LEFT SIDE: ICD-10-CM

## 2020-01-31 DIAGNOSIS — N92.6 IRREGULAR MENSTRUATION, UNSPECIFIED: ICD-10-CM

## 2020-01-31 DIAGNOSIS — O34.219 MATERNAL CARE FOR UNSPECIFIED TYPE SCAR FROM PREVIOUS CESAREAN DELIVERY: Chronic | ICD-10-CM

## 2020-01-31 PROCEDURE — 95810 POLYSOM 6/> YRS 4/> PARAM: CPT | Mod: 26

## 2020-01-31 PROCEDURE — 95810 POLYSOM 6/> YRS 4/> PARAM: CPT

## 2020-01-31 PROCEDURE — 99202 OFFICE O/P NEW SF 15 MIN: CPT

## 2020-01-31 NOTE — ASSESSMENT
[FreeTextEntry1] : In the anticipated bariatric surgery she'll be scheduled for an upper endoscopy with biopsies to rule out H. pylori. The risks, benefits, complications and possible adverse consequences associated with upper endoscopy were discussed with the patient.\par

## 2020-01-31 NOTE — PHYSICAL EXAM
[General Appearance - Alert] : alert [General Appearance - In No Acute Distress] : in no acute distress [General Appearance - Well Nourished] : well nourished [General Appearance - Well Developed] : well developed [General Appearance - Well-Appearing] : healthy appearing [Sclera] : the sclera and conjunctiva were normal [PERRL With Normal Accommodation] : pupils were equal in size, round, and reactive to light [Extraocular Movements] : extraocular movements were intact [Outer Ear] : the ears and nose were normal in appearance [Hearing Threshold Finger Rub Not Cross] : hearing was normal [Examination Of The Oral Cavity] : the lips and gums were normal [Oropharynx] : the oropharynx was normal [Neck Appearance] : the appearance of the neck was normal [Auscultation Breath Sounds / Voice Sounds] : lungs were clear to auscultation bilaterally [Heart Rate And Rhythm] : heart rate was normal and rhythm regular [Heart Sounds] : normal S1 and S2 [Heart Sounds Gallop] : no gallops [Murmurs] : no murmurs [Heart Sounds Pericardial Friction Rub] : no pericardial rub [Bowel Sounds] : normal bowel sounds [Abdomen Soft] : soft [Abdomen Tenderness] : non-tender [Abdomen Mass (___ Cm)] : no abdominal mass palpated [Abnormal Walk] : normal gait [Nail Clubbing] : no clubbing  or cyanosis of the fingernails [Musculoskeletal - Swelling] : no joint swelling seen [Motor Tone] : muscle strength and tone were normal [Skin Color & Pigmentation] : normal skin color and pigmentation [Skin Turgor] : normal skin turgor [] : no rash [Motor Exam] : the motor exam was normal [No Focal Deficits] : no focal deficits [Oriented To Time, Place, And Person] : oriented to person, place, and time [Impaired Insight] : insight and judgment were intact [Affect] : the affect was normal [FreeTextEntry1] : obese

## 2020-01-31 NOTE — HISTORY OF PRESENT ILLNESS
[de-identified] : The patient is referred for preoperative evaluation prior to bariatric surgery. She denies any abdominal pain, nausea, vomiting, diarrhea or constipation. There is no rectal bleeding or melena. Her appetite is good. She denies any heartburn or dysphagia. There is no family history of colon cancer. She has no gastrointestinal symptoms whatsoever.

## 2020-02-05 ENCOUNTER — APPOINTMENT (OUTPATIENT)
Dept: BARIATRICS/WEIGHT MGMT | Facility: CLINIC | Age: 45
End: 2020-02-05
Payer: COMMERCIAL

## 2020-02-05 VITALS — WEIGHT: 230.16 LBS | BODY MASS INDEX: 43.45 KG/M2 | HEIGHT: 61 IN

## 2020-02-05 PROCEDURE — 97802 MEDICAL NUTRITION INDIV IN: CPT

## 2020-02-17 ENCOUNTER — APPOINTMENT (OUTPATIENT)
Dept: PULMONOLOGY | Facility: CLINIC | Age: 45
End: 2020-02-17
Payer: COMMERCIAL

## 2020-02-17 VITALS
WEIGHT: 230 LBS | HEIGHT: 61.5 IN | HEART RATE: 80 BPM | RESPIRATION RATE: 14 BRPM | OXYGEN SATURATION: 97 % | SYSTOLIC BLOOD PRESSURE: 106 MMHG | DIASTOLIC BLOOD PRESSURE: 76 MMHG | BODY MASS INDEX: 42.87 KG/M2

## 2020-02-17 PROCEDURE — 99214 OFFICE O/P EST MOD 30 MIN: CPT

## 2020-02-17 NOTE — HISTORY OF PRESENT ILLNESS
[Excessive Daytime Sleepiness] : excessive daytime sleepiness [Snoring] : snoring [Sleepy When Sedentary] : sleepy when sedentary [None] : No associated symptoms are reported [Currently Experiencing] : The patient is currently experiencing symptoms. [Excess Weight] : excess weight [Dyspnea] : dyspnea [Back Pain] : back pain [FreeTextEntry1] : Patient c/o SOBOE but is otherwise without associated respiratory complaints.\par She presents for evaluation prior to her upcoming bariatric surgery.\par  [Witnessed Apnea During Sleep] : no witnessed apnea during sleep [Witnessed Gasping During Sleep] : no witnessed gasping during sleep [Unrefreshing Sleep] : no unrefreshing sleep [Follow-Up - Routine Clinic] : a routine clinic follow-up of

## 2020-02-17 NOTE — PHYSICAL EXAM
[General Appearance - Well Developed] : well developed [General Appearance - In No Acute Distress] : no acute distress [Normal Appearance] : normal appearance [Normal Conjunctiva] : the conjunctiva exhibited no abnormalities [Elongated Uvula] : elongated uvula [Low Lying Soft Palate] : low lying soft palate [IV] : IV [Enlarged Base of the Tongue] : enlargement of the base of the tongue [Neck Appearance] : the appearance of the neck was normal [Heart Sounds] : normal S1 and S2 [Heart Rate And Rhythm] : heart rate and rhythm were normal [Edema] : no peripheral edema present [Murmurs] : no murmurs present [] : no respiratory distress [Exaggerated Use Of Accessory Muscles For Inspiration] : no accessory muscle use [Auscultation Breath Sounds / Voice Sounds] : lungs were clear to auscultation bilaterally [Respiration, Rhythm And Depth] : normal respiratory rhythm and effort [Abdomen Tenderness] : non-tender [Abdomen Soft] : soft [Abnormal Walk] : normal gait [No Focal Deficits] : no focal deficits [Nail Clubbing] : no clubbing of the fingernails [Cyanosis, Localized] : no localized cyanosis [Oriented To Time, Place, And Person] : oriented to person, place, and time [FreeTextEntry1] : No abnormalities.

## 2020-02-17 NOTE — DISCUSSION/SUMMARY
[FreeTextEntry1] : \par #1. PFTs performed previously revealed mild restriction likely related to morbid obesity\par #2. The patient does not appear to require chronic BD therapy at this time\par #3. SOBOE is likely related to weight or deconditioning given restriction on PFTs\par #4. Diet and exercise for weight loss\par #5. AutoCPAP to treat moderate FABRICIO; severe in REM sleep\par #6. Preop CXR\par #7. Further pre-op recommendations will be forthcoming pending the above w/u\par #8. F/u one month after starting CPAP therapy and with CXR\par

## 2020-02-17 NOTE — COUNSELING
[Potential consequences of obesity discussed] : Potential consequences of obesity discussed [Encouraged to increase physical activity] : Encouraged to increase physical activity [Benefits of weight loss discussed] : Benefits of weight loss discussed

## 2020-02-17 NOTE — REVIEW OF SYSTEMS
[As Noted in HPI] : as noted in HPI [Fever] : no fever [Chills] : no chills [Dry Eyes] : no dryness of the eyes [Eye Irritation] : no ~T irritation of the eyes [Nasal Congestion] : no nasal congestion [Epistaxis] : no nosebleeds [Postnasal Drip] : no postnasal drip [Sinus Problems] : no sinus problems [Cough] : no cough [Sputum] : not coughing up ~M sputum [Dyspnea] : no dyspnea [Pleuritic Pain] : no pleuritic pain [Chest Tightness] : no chest tightness [Chest Discomfort] : no chest discomfort [Hypertension] : no ~T hypertension [Wheezing] : no wheezing [Palpitations] : no palpitations [Murmurs] : no murmurs were heard [Dysrhythmia] : no dysrhythmia [Edema] : ~T edema was not present [Hay Fever] : no hay fever [Itchy Eyes] : no itching of ~T the eyes [Vomiting] : no vomiting [Reflux] : no reflux [Nausea] : no nausea [Constipation] : no constipation [Abdominal Pain] : no abdominal pain [Diarrhea] : no diarrhea [Dysuria] : no dysuria [Trauma] : no ~T physical trauma [Fracture] : no fracture [Anemia] : no anemia [Syncope] : no fainting [Dizziness] : no dizziness [Headache] : no headache [Seizures] : no seizures [Numbness] : no numbness [Paralysis] : no paralysis was seen [Anxiety] : no anxiety [Depression] : no depression [Diabetes] : no diabetes mellitus [Thyroid Problem] : no thyroid problem

## 2020-02-17 NOTE — CONSULT LETTER
[Dear  ___] : Dear  [unfilled], [Please see my note below.] : Please see my note below. [Consult Closing:] : Thank you very much for allowing me to participate in the care of this patient.  If you have any questions, please do not hesitate to contact me. [Consult Letter:] : I had the pleasure of evaluating your patient, [unfilled]. [Sincerely,] : Sincerely, [DrBen  ___] : Dr. HERNANDEZ [FreeTextEntry3] : Jose Arredondo MD, FCCP, D. ABSM\par Pulmonary and Sleep Medicine\par [facility] at [place]

## 2020-02-18 LAB
ANION GAP SERPL CALC-SCNC: 9 MMOL/L
BASOPHILS # BLD AUTO: 0.03 K/UL
BASOPHILS NFR BLD AUTO: 0.6 %
BUN SERPL-MCNC: 10 MG/DL
CALCIUM SERPL-MCNC: 9.6 MG/DL
CHLORIDE SERPL-SCNC: 106 MMOL/L
CO2 SERPL-SCNC: 26 MMOL/L
CREAT SERPL-MCNC: 0.63 MG/DL
EOSINOPHIL # BLD AUTO: 0.09 K/UL
EOSINOPHIL NFR BLD AUTO: 1.8 %
GLUCOSE SERPL-MCNC: 99 MG/DL
HCT VFR BLD CALC: 37.2 %
HGB BLD-MCNC: 11.6 G/DL
IMM GRANULOCYTES NFR BLD AUTO: 0.4 %
INR PPP: 1.11 RATIO
LYMPHOCYTES # BLD AUTO: 1.8 K/UL
LYMPHOCYTES NFR BLD AUTO: 35.2 %
MAN DIFF?: NORMAL
MCHC RBC-ENTMCNC: 26.5 PG
MCHC RBC-ENTMCNC: 31.2 GM/DL
MCV RBC AUTO: 84.9 FL
MONOCYTES # BLD AUTO: 0.53 K/UL
MONOCYTES NFR BLD AUTO: 10.4 %
NEUTROPHILS # BLD AUTO: 2.64 K/UL
NEUTROPHILS NFR BLD AUTO: 51.6 %
PLATELET # BLD AUTO: 304 K/UL
POTASSIUM SERPL-SCNC: 4.3 MMOL/L
PT BLD: 12.7 SEC
RBC # BLD: 4.38 M/UL
RBC # FLD: 16.4 %
SODIUM SERPL-SCNC: 142 MMOL/L
WBC # FLD AUTO: 5.11 K/UL

## 2020-02-27 ENCOUNTER — FORM ENCOUNTER (OUTPATIENT)
Age: 45
End: 2020-02-27

## 2020-02-27 ENCOUNTER — APPOINTMENT (OUTPATIENT)
Dept: BARIATRICS | Facility: CLINIC | Age: 45
End: 2020-02-27
Payer: COMMERCIAL

## 2020-02-27 VITALS
SYSTOLIC BLOOD PRESSURE: 110 MMHG | BODY MASS INDEX: 42.82 KG/M2 | OXYGEN SATURATION: 98 % | WEIGHT: 229.72 LBS | HEIGHT: 61.5 IN | DIASTOLIC BLOOD PRESSURE: 80 MMHG | HEART RATE: 82 BPM

## 2020-02-27 DIAGNOSIS — Z13.29 ENCOUNTER FOR SCREENING FOR OTHER SUSPECTED ENDOCRINE DISORDER: ICD-10-CM

## 2020-02-27 DIAGNOSIS — Z87.898 PERSONAL HISTORY OF OTHER SPECIFIED CONDITIONS: ICD-10-CM

## 2020-02-27 PROCEDURE — 99213 OFFICE O/P EST LOW 20 MIN: CPT

## 2020-02-28 ENCOUNTER — OUTPATIENT (OUTPATIENT)
Dept: OUTPATIENT SERVICES | Facility: HOSPITAL | Age: 45
LOS: 1 days | End: 2020-02-28
Payer: COMMERCIAL

## 2020-02-28 DIAGNOSIS — O34.219 MATERNAL CARE FOR UNSPECIFIED TYPE SCAR FROM PREVIOUS CESAREAN DELIVERY: Chronic | ICD-10-CM

## 2020-02-28 DIAGNOSIS — R06.02 SHORTNESS OF BREATH: ICD-10-CM

## 2020-02-28 PROCEDURE — 71046 X-RAY EXAM CHEST 2 VIEWS: CPT | Mod: 26

## 2020-03-04 NOTE — HISTORY OF PRESENT ILLNESS
[de-identified] : 44 year old female with longstanding history of obesity undergoing workup for laparoscopic sleeve gastrectomy presents today for WEIGH IN visit. Patient maintained weight since last visit. She is having three protein rich meals a day and eliminated "junk food" and soda. She is walking frequently for exercise.  Patient is mostly complete with preoperative evaluations. She was diagnosed with moderate FABRICIO and is expected to get CPAP machine today. She is also scheduled to have upper EGD next week.

## 2020-03-04 NOTE — ASSESSMENT
[FreeTextEntry1] : 44 year old female with longstanding history of obesity undergoing preoperative workup for laparoscopic sleeve gastrectomy here today for WEIGH IN visit. Patient has maintained weight since last visit and is encouraged to lose weight prior to surgery. Once evaluations are completed she will be scheduled for laparoscopic sleeve gastrectomy.  Nutrition and exercise guidelines were reviewed with the patient. Procedure and risks reviewed. All questions answered.\par \par Outstanding items - pulmonary clearance and upper EGD\par Follow up once complete with workup\par Call with any questions or concerns.\par

## 2020-03-04 NOTE — PHYSICAL EXAM
[Obese, well nourished, in no acute distress] : obese, well nourished, in no acute distress [Normal] : grossly intact [de-identified] : EOMI, anicteric  [de-identified] : obese, soft, nontender, no evidence of hernia.

## 2020-03-05 ENCOUNTER — APPOINTMENT (OUTPATIENT)
Dept: GASTROENTEROLOGY | Facility: HOSPITAL | Age: 45
End: 2020-03-05
Payer: COMMERCIAL

## 2020-03-05 ENCOUNTER — OUTPATIENT (OUTPATIENT)
Dept: OUTPATIENT SERVICES | Facility: HOSPITAL | Age: 45
LOS: 1 days | End: 2020-03-05
Payer: COMMERCIAL

## 2020-03-05 ENCOUNTER — RESULT REVIEW (OUTPATIENT)
Age: 45
End: 2020-03-05

## 2020-03-05 DIAGNOSIS — O34.219 MATERNAL CARE FOR UNSPECIFIED TYPE SCAR FROM PREVIOUS CESAREAN DELIVERY: Chronic | ICD-10-CM

## 2020-03-05 DIAGNOSIS — Z01.818 ENCOUNTER FOR OTHER PREPROCEDURAL EXAMINATION: ICD-10-CM

## 2020-03-05 LAB — HCG UR QL: NEGATIVE — SIGNIFICANT CHANGE UP

## 2020-03-05 PROCEDURE — 81025 URINE PREGNANCY TEST: CPT

## 2020-03-05 PROCEDURE — 43239 EGD BIOPSY SINGLE/MULTIPLE: CPT

## 2020-03-05 PROCEDURE — 88305 TISSUE EXAM BY PATHOLOGIST: CPT | Mod: 26

## 2020-03-05 PROCEDURE — 88342 IMHCHEM/IMCYTCHM 1ST ANTB: CPT

## 2020-03-05 PROCEDURE — 88305 TISSUE EXAM BY PATHOLOGIST: CPT

## 2020-03-05 PROCEDURE — 88342 IMHCHEM/IMCYTCHM 1ST ANTB: CPT | Mod: 26

## 2020-03-05 NOTE — PROCEDURE
[With Biopsy] : with biopsy [Procedure Explained] : The procedure was explained [Allergies Reviewed] : allergies reviewed. [Risks] : Risks [Benefits] : benefits [Consent Obtained] : written consent was obtained prior to the procedure and is detailed in the patient's record [Patient] : the patient [Automated Blood Pressure Cuff] : automated blood pressure cuff [Cardiac Monitor] : cardiac monitor [Pulse Oximeter] : pulse oximeter [Versed ___ mg IV] : Versed [unfilled] ~Umg intravenously [Propofol ___ mg IV] : Propofol [unfilled] ~Umg intravenously [Fentanyl ___ mcgm IV] : Fentanyl [unfilled] ~Umcgm intravenously [3] : 3 [Polyps] : polyps [Normal] : Normal [Sent to Pathology] : was sent to pathology for analysis [Tolerated Well] : the patient tolerated the procedure well [Vital Signs Stable] : the vital signs were stable [de-identified] : pre op evaluation for bariatric surgery [de-identified] : several bland 2-3mm polyps probably representing fundic gland polyps with representative biopsy of one. Also random biopsy performed for H Pylori [de-identified] : biopsy performed for H Pyloriu [de-identified] : She should call next week for pathology

## 2020-03-05 NOTE — PROCEDURE
[With Biopsy] : with biopsy [Procedure Explained] : The procedure was explained [Allergies Reviewed] : allergies reviewed. [Risks] : Risks [Benefits] : benefits [Consent Obtained] : written consent was obtained prior to the procedure and is detailed in the patient's record [Patient] : the patient [Automated Blood Pressure Cuff] : automated blood pressure cuff [Cardiac Monitor] : cardiac monitor [Pulse Oximeter] : pulse oximeter [Versed ___ mg IV] : Versed [unfilled] ~Umg intravenously [Propofol ___ mg IV] : Propofol [unfilled] ~Umg intravenously [Fentanyl ___ mcgm IV] : Fentanyl [unfilled] ~Umcgm intravenously [3] : 3 [Polyps] : polyps [Normal] : Normal [Sent to Pathology] : was sent to pathology for analysis [Tolerated Well] : the patient tolerated the procedure well [Vital Signs Stable] : the vital signs were stable [de-identified] : pre op evaluation for bariatric surgery [de-identified] : several bland 2-3mm polyps probably representing fundic gland polyps with representative biopsy of one. Also random biopsy performed for H Pylori [de-identified] : biopsy performed for H Pyloriu [de-identified] : She should call next week for pathology

## 2020-03-10 LAB — SURGICAL PATHOLOGY STUDY: SIGNIFICANT CHANGE UP

## 2020-04-02 ENCOUNTER — APPOINTMENT (OUTPATIENT)
Dept: PULMONOLOGY | Facility: CLINIC | Age: 45
End: 2020-04-02

## 2020-04-08 ENCOUNTER — APPOINTMENT (OUTPATIENT)
Dept: PULMONOLOGY | Facility: CLINIC | Age: 45
End: 2020-04-08

## 2020-04-08 RX ORDER — CLARITHROMYCIN 500 MG/1
500 TABLET, FILM COATED ORAL TWICE DAILY
Qty: 28 | Refills: 0 | Status: DISCONTINUED | COMMUNITY
Start: 2020-03-10 | End: 2020-04-08

## 2020-04-08 RX ORDER — AMOXICILLIN 500 MG/1
500 TABLET, FILM COATED ORAL TWICE DAILY
Qty: 56 | Refills: 0 | Status: DISCONTINUED | COMMUNITY
Start: 2020-03-10 | End: 2020-04-08

## 2020-04-08 RX ORDER — METRONIDAZOLE 500 MG/1
500 TABLET ORAL
Qty: 28 | Refills: 0 | Status: DISCONTINUED | COMMUNITY
Start: 2020-03-10 | End: 2020-04-08

## 2020-04-22 ENCOUNTER — APPOINTMENT (OUTPATIENT)
Dept: PULMONOLOGY | Facility: CLINIC | Age: 45
End: 2020-04-22
Payer: COMMERCIAL

## 2020-04-22 PROCEDURE — 99213 OFFICE O/P EST LOW 20 MIN: CPT | Mod: 95

## 2020-04-22 NOTE — HISTORY OF PRESENT ILLNESS
[Home] : at home, [unfilled] , at the time of the visit. [Medical Office: ([facility])___] : at the medical office located in  [Patient] : the patient [Self] : self [Excessive Daytime Sleepiness] : excessive daytime sleepiness [Snoring] : snoring [Sleepy When Sedentary] : sleepy when sedentary [None] : No associated symptoms are reported [Poor Compliance] : poor compliance with treatment [Poor Symptom Control] : poor symptom control [Poor Tolerance] : poor tolerance of treatment [Follow-Up - Routine Clinic] : a routine clinic follow-up of [Excess Weight] : excess weight [Currently Experiencing] : The patient is currently experiencing symptoms. [Dyspnea] : dyspnea [Back Pain] : back pain [FreeTextEntry1] : Patient c/o SOBOE but is otherwise without associated respiratory complaints.\par She presents for evaluation prior to her upcoming bariatric surgery.\par  [Witnessed Apnea During Sleep] : no witnessed apnea during sleep [Witnessed Gasping During Sleep] : no witnessed gasping during sleep [Unrefreshing Sleep] : no unrefreshing sleep [de-identified] : AutoCPAP

## 2020-04-22 NOTE — DISCUSSION/SUMMARY
[FreeTextEntry1] : \par #1. PFTs performed previously revealed mild restriction likely related to morbid obesity\par #2. The patient does not appear to require chronic BD therapy at this time\par #3. SOBOE is likely related to weight or deconditioning given restriction on PFTs\par #4. Diet and exercise for weight loss\par #5. Continue AutoCPAP to treat moderate FABRICIO; severe in REM sleep. Will adjust pressure and change mask to nasal mask\par #6. Preop CXR was essentially clear\par #7. Further pre-op recommendations will be forthcoming pending the above w/u\par #8. F/u 1-2 months with compliance\par

## 2020-04-22 NOTE — PHYSICAL EXAM
[General Appearance - Well Developed] : well developed [General Appearance - In No Acute Distress] : no acute distress [Normal Appearance] : normal appearance [Normal Conjunctiva] : the conjunctiva exhibited no abnormalities [IV] : IV [Neck Appearance] : the appearance of the neck was normal [] : no respiratory distress [Oriented To Time, Place, And Person] : oriented to person, place, and time [FreeTextEntry1] : Morbidly obese

## 2020-04-22 NOTE — REVIEW OF SYSTEMS
[As Noted in HPI] : as noted in HPI [Fever] : no fever [Chills] : no chills [Dry Eyes] : no dryness of the eyes [Eye Irritation] : no ~T irritation of the eyes [Nasal Congestion] : no nasal congestion [Epistaxis] : no nosebleeds [Postnasal Drip] : no postnasal drip [Sinus Problems] : no sinus problems [Sputum] : not coughing up ~M sputum [Cough] : no cough [Dyspnea] : no dyspnea [Chest Tightness] : no chest tightness [Pleuritic Pain] : no pleuritic pain [Wheezing] : no wheezing [Hypertension] : no ~T hypertension [Chest Discomfort] : no chest discomfort [Dysrhythmia] : no dysrhythmia [Palpitations] : no palpitations [Murmurs] : no murmurs were heard [Edema] : ~T edema was not present [Hay Fever] : no hay fever [Itchy Eyes] : no itching of ~T the eyes [Reflux] : no reflux [Nausea] : no nausea [Vomiting] : no vomiting [Constipation] : no constipation [Diarrhea] : no diarrhea [Abdominal Pain] : no abdominal pain [Dysuria] : no dysuria [Trauma] : no ~T physical trauma [Fracture] : no fracture [Anemia] : no anemia [Headache] : no headache [Syncope] : no fainting [Dizziness] : no dizziness [Numbness] : no numbness [Paralysis] : no paralysis was seen [Depression] : no depression [Anxiety] : no anxiety [Seizures] : no seizures [Diabetes] : no diabetes mellitus [Thyroid Problem] : no thyroid problem

## 2020-04-22 NOTE — REASON FOR VISIT
[Follow-Up] : a follow-up visit [Shortness of Breath] : shortness of Breath [Sleep Apnea] : sleep apnea [FreeTextEntry2] : morbid obesity, clearance

## 2020-04-22 NOTE — CONSULT LETTER
[Dear  ___] : Dear  [unfilled], [Consult Letter:] : I had the pleasure of evaluating your patient, [unfilled]. [Please see my note below.] : Please see my note below. [Sincerely,] : Sincerely, [Consult Closing:] : Thank you very much for allowing me to participate in the care of this patient.  If you have any questions, please do not hesitate to contact me. [DrBen  ___] : Dr. HERNANDEZ [FreeTextEntry3] : Jose Arredondo MD, FCCP, D. ABSM\par Pulmonary and Sleep Medicine\par [facility] [place]

## 2020-04-25 ENCOUNTER — MESSAGE (OUTPATIENT)
Age: 45
End: 2020-04-25

## 2020-05-02 LAB
SARS-COV-2 IGG SERPL IA-ACNC: <0.1 INDEX
SARS-COV-2 IGG SERPL QL IA: NEGATIVE

## 2020-08-18 NOTE — ED ADULT NURSE NOTE - MUSCULOSKELETAL ASSESSMENT
August 18, 2020    NAZIA Landon  [address]  [place] [address]          Dear NAZIA Landon:  MRN: [id number]    This is a follow up to your recent labs, your lab results were stable.  There are no medicine changes.  Please have your labs drawn again on 11/9/20.      If you cannot have your labs drawn on the scheduled date, it is your responsibility to call the transplant department to reschedule.  Please call [phone] and ask to speak to [facility] -  for all scheduling requests.     Sincerely,    Mayra Leigh, RN, BSN, CCTC  Sr Liver Transplant Coordinator  [facility]  [address] [place] [id number]  [phone]        WDL

## 2020-08-25 ENCOUNTER — APPOINTMENT (OUTPATIENT)
Dept: PULMONOLOGY | Facility: CLINIC | Age: 45
End: 2020-08-25
Payer: COMMERCIAL

## 2020-08-25 VITALS
DIASTOLIC BLOOD PRESSURE: 84 MMHG | RESPIRATION RATE: 14 BRPM | BODY MASS INDEX: 43.22 KG/M2 | WEIGHT: 226 LBS | HEART RATE: 78 BPM | HEIGHT: 60.5 IN | OXYGEN SATURATION: 98 % | SYSTOLIC BLOOD PRESSURE: 132 MMHG

## 2020-08-25 PROCEDURE — 99214 OFFICE O/P EST MOD 30 MIN: CPT

## 2020-08-25 NOTE — DISCUSSION/SUMMARY
[FreeTextEntry1] : \par #1. PFTs performed previously revealed mild restriction likely related to morbid obesity\par #2. The patient does not appear to require chronic BD therapy at this time\par #3. SOBOE is likely related to weight or deconditioning given restriction on PFTs\par #4. Diet and exercise for weight loss\par #5. Continue AutoCPAP to treat moderate FABRICIO; severe in REM sleep. Will adjust pressure and change mask to nasal mask\par #6. Preop CXR was essentially clear\par #7. F/u in 4-6 weeks for re-evaluation with PFTs and compliance\par #8. Based on patient's prior w/u including PFTs which revealed mild restriction and her h/o moderate FABRICIO, the patient is at mildly increased risk for post op respiratory complications including but not limited to respiratory failure or prolonged intubation if GA is used. I would recommend that her autoCPAP should be available to her post op and with sleep. Alternatively, pt could use CPAP at 8 cm of water which is her median pressure.. I would also recommend post op incentive spirometry, GI/DVT prophylaxis as needed, early ambulation as able, and adequate pain control. Would recommend that O2 saturation should be monitored during and after the procedure.\par #9. Reviewed risks of exposure and symptoms of Covid-19 virus, including how the virus is spread.\par \par Discussed the following risk-reducing strategies:\par -Wash hands with soap and water (proper technique reviewed) \par -Use alcohol based  when hand-washing is not an option\par -Maintain a social distance of at least 6 feet whenever possible\par -Avoid contact, especially hand shaking\par -Avoid touching eyes, nose, and mouth\par -Cover face/mouth when coughing or sneezing\par -Avoid close contact with sick people\par -Seek medical help if you develop a fever and/or respiratory symptoms (e.g. cough, chest pain, SOB)\par \par Patient's questions were answered and patient appears to understand these recommendations

## 2020-08-25 NOTE — PHYSICAL EXAM
[No Acute Distress] : no acute distress [Well Nourished] : well nourished [Well Developed] : well developed [Normal Appearance] : normal appearance [Supple] : supple [Normal Rate/Rhythm] : normal rate/rhythm [Normal S1, S2] : normal s1, s2 [No Murmurs] : no murmurs [No Resp Distress] : no resp distress [No Acc Muscle Use] : no acc muscle use [Normal Rhythm and Effort] : normal rhythm and effort [Clear to Auscultation Bilaterally] : clear to auscultation bilaterally [No Abnormalities] : no abnormalities [Benign] : benign [Not Tender] : not tender [Soft] : soft [Normal Gait] : normal gait [No Clubbing] : no clubbing [FROM] : FROM [No Edema] : no edema [No Focal Deficits] : no focal deficits [Oriented x3] : oriented x3 [TextBox_2] : Morbidly obese

## 2020-08-25 NOTE — REVIEW OF SYSTEMS
[As Noted in HPI] : as noted in HPI [Dry Eyes] : no dryness of the eyes [Chills] : no chills [Fever] : no fever [Eye Irritation] : no ~T irritation of the eyes [Nasal Congestion] : no nasal congestion [Epistaxis] : no nosebleeds [Cough] : no cough [Sinus Problems] : no sinus problems [Postnasal Drip] : no postnasal drip [Dyspnea] : no dyspnea [Sputum] : not coughing up ~M sputum [Chest Tightness] : no chest tightness [Hypertension] : no ~T hypertension [Wheezing] : no wheezing [Pleuritic Pain] : no pleuritic pain [Dysrhythmia] : no dysrhythmia [Chest Discomfort] : no chest discomfort [Murmurs] : no murmurs were heard [Palpitations] : no palpitations [Edema] : ~T edema was not present [Reflux] : no reflux [Itchy Eyes] : no itching of ~T the eyes [Hay Fever] : no hay fever [Diarrhea] : no diarrhea [Vomiting] : no vomiting [Constipation] : no constipation [Nausea] : no nausea [Abdominal Pain] : no abdominal pain [Dysuria] : no dysuria [Fracture] : no fracture [Trauma] : no ~T physical trauma [Anemia] : no anemia [Syncope] : no fainting [Dizziness] : no dizziness [Headache] : no headache [Numbness] : no numbness [Paralysis] : no paralysis was seen [Seizures] : no seizures [Anxiety] : no anxiety [Depression] : no depression [Diabetes] : no diabetes mellitus [Thyroid Problem] : no thyroid problem

## 2020-08-25 NOTE — HISTORY OF PRESENT ILLNESS
[Excessive Daytime Sleepiness] : excessive daytime sleepiness [Snoring] : snoring [Sleepy When Sedentary] : sleepy when sedentary [Poor Compliance] : poor compliance with treatment [None] : No associated symptoms are reported [Poor Tolerance] : poor tolerance of treatment [Poor Symptom Control] : poor symptom control [Follow-Up - Routine Clinic] : a routine clinic follow-up of [Excess Weight] : excess weight [Currently Experiencing] : The patient is currently experiencing symptoms. [Dyspnea] : dyspnea [Back Pain] : back pain [Witnessed Apnea During Sleep] : no witnessed apnea during sleep [FreeTextEntry1] : Patient c/o SOBOE but is otherwise without associated respiratory complaints.\par She presents for evaluation prior to her upcoming bariatric surgery.\par  [Witnessed Gasping During Sleep] : no witnessed gasping during sleep [de-identified] : AutoCPAP [Unrefreshing Sleep] : no unrefreshing sleep

## 2020-08-25 NOTE — CONSULT LETTER
[Dear  ___] : Dear  [unfilled], [Consult Letter:] : I had the pleasure of evaluating your patient, [unfilled]. [Please see my note below.] : Please see my note below. [Consult Closing:] : Thank you very much for allowing me to participate in the care of this patient.  If you have any questions, please do not hesitate to contact me. [Sincerely,] : Sincerely, [DrBen  ___] : Dr. HERNANDEZ [FreeTextEntry3] : Jose Arredondo MD, FCCP, D. ABSM\par Pulmonary and Sleep Medicine\par [facility] at [place]

## 2020-09-07 LAB — UREA BREATH TEST QL: NEGATIVE

## 2020-09-15 ENCOUNTER — APPOINTMENT (OUTPATIENT)
Dept: BARIATRICS/WEIGHT MGMT | Facility: CLINIC | Age: 45
End: 2020-09-15
Payer: COMMERCIAL

## 2020-09-15 PROCEDURE — 90791 PSYCH DIAGNOSTIC EVALUATION: CPT | Mod: 95

## 2020-09-16 ENCOUNTER — EMERGENCY (EMERGENCY)
Facility: HOSPITAL | Age: 45
LOS: 0 days | Discharge: HOME | End: 2020-09-17
Attending: EMERGENCY MEDICINE
Payer: COMMERCIAL

## 2020-09-16 VITALS — HEIGHT: 62 IN | WEIGHT: 225.97 LBS

## 2020-09-16 DIAGNOSIS — S39.012A STRAIN OF MUSCLE, FASCIA AND TENDON OF LOWER BACK, INITIAL ENCOUNTER: ICD-10-CM

## 2020-09-16 DIAGNOSIS — M54.9 DORSALGIA, UNSPECIFIED: ICD-10-CM
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
## 2020-09-16 DIAGNOSIS — O34.219 MATERNAL CARE FOR UNSPECIFIED TYPE SCAR FROM PREVIOUS CESAREAN DELIVERY: Chronic | ICD-10-CM
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
## 2020-09-16 LAB
ALBUMIN SERPL ELPH-MCNC: 3.3 G/DL — SIGNIFICANT CHANGE UP (ref 3.3–5)
ALP SERPL-CCNC: 80 U/L — SIGNIFICANT CHANGE UP (ref 40–120)
ALT FLD-CCNC: 40 U/L — SIGNIFICANT CHANGE UP (ref 12–78)
ANION GAP SERPL CALC-SCNC: 2 MMOL/L — LOW (ref 5–17)
APPEARANCE UR: CLEAR — SIGNIFICANT CHANGE UP
AST SERPL-CCNC: 26 U/L — SIGNIFICANT CHANGE UP (ref 15–37)
BASOPHILS # BLD AUTO: 0.02 K/UL — SIGNIFICANT CHANGE UP (ref 0–0.2)
BASOPHILS NFR BLD AUTO: 0.5 % — SIGNIFICANT CHANGE UP (ref 0–2)
BILIRUB SERPL-MCNC: 0.2 MG/DL — SIGNIFICANT CHANGE UP (ref 0.2–1.2)
BILIRUB UR-MCNC: NEGATIVE — SIGNIFICANT CHANGE UP
BUN SERPL-MCNC: 9 MG/DL — SIGNIFICANT CHANGE UP (ref 7–23)
CALCIUM SERPL-MCNC: 9 MG/DL — SIGNIFICANT CHANGE UP (ref 8.5–10.1)
CHLORIDE SERPL-SCNC: 108 MMOL/L — SIGNIFICANT CHANGE UP (ref 96–108)
CO2 SERPL-SCNC: 30 MMOL/L — SIGNIFICANT CHANGE UP (ref 22–31)
COLOR SPEC: YELLOW — SIGNIFICANT CHANGE UP
CREAT SERPL-MCNC: 0.7 MG/DL — SIGNIFICANT CHANGE UP (ref 0.5–1.3)
DIFF PNL FLD: ABNORMAL
EOSINOPHIL # BLD AUTO: 0.05 K/UL — SIGNIFICANT CHANGE UP (ref 0–0.5)
EOSINOPHIL NFR BLD AUTO: 1.1 % — SIGNIFICANT CHANGE UP (ref 0–6)
GLUCOSE SERPL-MCNC: 93 MG/DL — SIGNIFICANT CHANGE UP (ref 70–99)
GLUCOSE UR QL: NEGATIVE MG/DL — SIGNIFICANT CHANGE UP
HCT VFR BLD CALC: 34.6 % — SIGNIFICANT CHANGE UP (ref 34.5–45)
HGB BLD-MCNC: 11.1 G/DL — LOW (ref 11.5–15.5)
IMM GRANULOCYTES NFR BLD AUTO: 0.2 % — SIGNIFICANT CHANGE UP (ref 0–1.5)
KETONES UR-MCNC: NEGATIVE — SIGNIFICANT CHANGE UP
LEUKOCYTE ESTERASE UR-ACNC: NEGATIVE — SIGNIFICANT CHANGE UP
LYMPHOCYTES # BLD AUTO: 1.64 K/UL — SIGNIFICANT CHANGE UP (ref 1–3.3)
LYMPHOCYTES # BLD AUTO: 37.1 % — SIGNIFICANT CHANGE UP (ref 13–44)
MCHC RBC-ENTMCNC: 26.6 PG — LOW (ref 27–34)
MCHC RBC-ENTMCNC: 32.1 GM/DL — SIGNIFICANT CHANGE UP (ref 32–36)
MCV RBC AUTO: 82.8 FL — SIGNIFICANT CHANGE UP (ref 80–100)
MONOCYTES # BLD AUTO: 0.54 K/UL — SIGNIFICANT CHANGE UP (ref 0–0.9)
MONOCYTES NFR BLD AUTO: 12.2 % — SIGNIFICANT CHANGE UP (ref 2–14)
NEUTROPHILS # BLD AUTO: 2.16 K/UL — SIGNIFICANT CHANGE UP (ref 1.8–7.4)
NEUTROPHILS NFR BLD AUTO: 48.9 % — SIGNIFICANT CHANGE UP (ref 43–77)
NITRITE UR-MCNC: NEGATIVE — SIGNIFICANT CHANGE UP
PH UR: 5 — SIGNIFICANT CHANGE UP (ref 5–8)
PLATELET # BLD AUTO: 258 K/UL — SIGNIFICANT CHANGE UP (ref 150–400)
POTASSIUM SERPL-MCNC: 3.8 MMOL/L — SIGNIFICANT CHANGE UP (ref 3.5–5.3)
POTASSIUM SERPL-SCNC: 3.8 MMOL/L — SIGNIFICANT CHANGE UP (ref 3.5–5.3)
PROT SERPL-MCNC: 7.9 GM/DL — SIGNIFICANT CHANGE UP (ref 6–8.3)
PROT UR-MCNC: NEGATIVE MG/DL — SIGNIFICANT CHANGE UP
RBC # BLD: 4.18 M/UL — SIGNIFICANT CHANGE UP (ref 3.8–5.2)
RBC # FLD: 15.2 % — HIGH (ref 10.3–14.5)
SODIUM SERPL-SCNC: 140 MMOL/L — SIGNIFICANT CHANGE UP (ref 135–145)
SP GR SPEC: 1.01 — SIGNIFICANT CHANGE UP (ref 1.01–1.02)
UROBILINOGEN FLD QL: NEGATIVE MG/DL — SIGNIFICANT CHANGE UP
WBC # BLD: 4.42 K/UL — SIGNIFICANT CHANGE UP (ref 3.8–10.5)
WBC # FLD AUTO: 4.42 K/UL — SIGNIFICANT CHANGE UP (ref 3.8–10.5)

## 2020-09-16 PROCEDURE — 74176 CT ABD & PELVIS W/O CONTRAST: CPT | Mod: 26

## 2020-09-16 PROCEDURE — 96374 THER/PROPH/DIAG INJ IV PUSH: CPT

## 2020-09-16 PROCEDURE — 74176 CT ABD & PELVIS W/O CONTRAST: CPT

## 2020-09-16 PROCEDURE — 80053 COMPREHEN METABOLIC PANEL: CPT

## 2020-09-16 PROCEDURE — 99284 EMERGENCY DEPT VISIT MOD MDM: CPT

## 2020-09-16 PROCEDURE — 36415 COLL VENOUS BLD VENIPUNCTURE: CPT

## 2020-09-16 PROCEDURE — 99284 EMERGENCY DEPT VISIT MOD MDM: CPT | Mod: 25

## 2020-09-16 PROCEDURE — 81001 URINALYSIS AUTO W/SCOPE: CPT

## 2020-09-16 PROCEDURE — 85025 COMPLETE CBC W/AUTO DIFF WBC: CPT

## 2020-09-16 PROCEDURE — 96361 HYDRATE IV INFUSION ADD-ON: CPT

## 2020-09-16 RX ORDER — CYCLOBENZAPRINE HYDROCHLORIDE 10 MG/1
1 TABLET, FILM COATED ORAL
Qty: 21 | Refills: 0
Start: 2020-09-16 | End: 2020-09-22

## 2020-09-16 RX ORDER — SODIUM CHLORIDE 9 MG/ML
1000 INJECTION INTRAMUSCULAR; INTRAVENOUS; SUBCUTANEOUS ONCE
Refills: 0 | Status: COMPLETED | OUTPATIENT
Start: 2020-09-16 | End: 2020-09-16

## 2020-09-16 RX ORDER — KETOROLAC TROMETHAMINE 30 MG/ML
30 SYRINGE (ML) INJECTION ONCE
Refills: 0 | Status: DISCONTINUED | OUTPATIENT
Start: 2020-09-16 | End: 2020-09-16

## 2020-09-16 RX ORDER — IBUPROFEN 200 MG
1 TABLET ORAL
Qty: 28 | Refills: 0
Start: 2020-09-16 | End: 2020-09-22

## 2020-09-16 RX ADMIN — SODIUM CHLORIDE 1000 MILLILITER(S): 9 INJECTION INTRAMUSCULAR; INTRAVENOUS; SUBCUTANEOUS at 22:17

## 2020-09-16 RX ADMIN — Medication 30 MILLIGRAM(S): at 21:16

## 2020-09-16 RX ADMIN — SODIUM CHLORIDE 1000 MILLILITER(S): 9 INJECTION INTRAMUSCULAR; INTRAVENOUS; SUBCUTANEOUS at 21:17

## 2020-09-16 NOTE — ED ADULT TRIAGE NOTE - CHIEF COMPLAINT QUOTE
Pt presents to ED c/o lower back pain x 3 days. Pt pt states pain radiates to lower abdomen. denies injury. last took Ibuprofen at 1800.

## 2020-09-16 NOTE — ED STATDOCS - CARE PROVIDER_API CALL
Alban Sylvester  ORTHOPAEDIC SURGERY  [address] [place] [id number]  Phone: [phone]  Fax: [phone]  Follow Up Time:

## 2020-09-16 NOTE — ED STATDOCS - PATIENT PORTAL LINK FT
You can access the FollowMyHealth Patient Portal offered by [facility] by registering at the following website: [email or web address]. By joining [facility]’s FollowMyHealth portal, you will also be able to view your health information using other applications (apps) compatible with our system.

## 2020-09-16 NOTE — ED STATDOCS - CARE PLAN
I have personally evaluated and examined the patient. The Attending was available to me as a supervising provider if needed. Principal Discharge DX:	Lumbar strain, initial encounter

## 2020-09-16 NOTE — ED STATDOCS - PROGRESS NOTE DETAILS
45 yr. old female PMH: presents to ED with left lower back pain radiating to abdomen and thigh onset 3 days ago. Worsening today when at work. No apparent injury. No fever or chills. Seen and examined by attending in Intake. Plan: UA/UC, Labs  and pain medication. Will F/U with results and re evaluate. discussed results with pt, pt feeling much better will d/c pain secondary to back spasm ASIA Rosas DO

## 2020-09-16 NOTE — ED STATDOCS - ATTENDING CONTRIBUTION TO CARE
Attending Contribution to Care: I, Ethel Villegas, performed the initial face to face bedside interview with this patient regarding history of present illness, review of symptoms and relevant past medical, social and family history.  I completed an independent physical examination.  I was the initial provider who evaluated this patient and the history, physical, and MDM reflect this intial assessment. I have signed out the follow up of any pending tests after the original (i.e. labs, radiological studies) to the ACP with instructions to review any with instructions to review any concerning findings to me prior to discharge.  I have communicated the patient’s plan of care and disposition with the ACP.

## 2020-09-16 NOTE — ED STATDOCS - OBJECTIVE STATEMENT
46 y/o F with PMHx of back pain, had period x2 days ago presenting to the ED c/o of L sided flank pain radiating to her frontx5 days.  Pain is improved with Ibuprofen. States that pain has worsened over the past few days, came to the ED for further evaluation. Denies any fever or chills.

## 2020-09-17 ENCOUNTER — APPOINTMENT (OUTPATIENT)
Dept: BARIATRICS/WEIGHT MGMT | Facility: CLINIC | Age: 45
End: 2020-09-17
Payer: COMMERCIAL

## 2020-09-17 VITALS
HEART RATE: 85 BPM | RESPIRATION RATE: 18 BRPM | DIASTOLIC BLOOD PRESSURE: 75 MMHG | TEMPERATURE: 98 F | SYSTOLIC BLOOD PRESSURE: 120 MMHG | OXYGEN SATURATION: 100 %

## 2020-09-17 VITALS — HEIGHT: 60.5 IN | BODY MASS INDEX: 43.22 KG/M2 | WEIGHT: 226 LBS

## 2020-09-17 PROCEDURE — 97803 MED NUTRITION INDIV SUBSEQ: CPT | Mod: 95

## 2020-09-23 LAB
25(OH)D3 SERPL-MCNC: 16.4 NG/ML
ALBUMIN SERPL ELPH-MCNC: 4 G/DL
ALP BLD-CCNC: 68 U/L
ALT SERPL-CCNC: 13 U/L
ANION GAP SERPL CALC-SCNC: 14 MMOL/L
AST SERPL-CCNC: 13 U/L
BASOPHILS # BLD AUTO: 0.02 K/UL
BASOPHILS NFR BLD AUTO: 0.4 %
BILIRUB SERPL-MCNC: 0.2 MG/DL
BUN SERPL-MCNC: 8 MG/DL
CALCIUM SERPL-MCNC: 9.3 MG/DL
CHLORIDE SERPL-SCNC: 104 MMOL/L
CHOLEST SERPL-MCNC: 164 MG/DL
CHOLEST/HDLC SERPL: 3.6 RATIO
CO2 SERPL-SCNC: 23 MMOL/L
CREAT SERPL-MCNC: 0.61 MG/DL
EOSINOPHIL # BLD AUTO: 0.06 K/UL
EOSINOPHIL NFR BLD AUTO: 1.2 %
ESTIMATED AVERAGE GLUCOSE: 126 MG/DL
FOLATE SERPL-MCNC: 7.6 NG/ML
GLUCOSE SERPL-MCNC: 92 MG/DL
HBA1C MFR BLD HPLC: 6 %
HCT VFR BLD CALC: 37.3 %
HDLC SERPL-MCNC: 45 MG/DL
HGB BLD-MCNC: 11.5 G/DL
IMM GRANULOCYTES NFR BLD AUTO: 0.4 %
IRON SATN MFR SERPL: 9 %
IRON SERPL-MCNC: 32 UG/DL
LDLC SERPL CALC-MCNC: 104 MG/DL
LYMPHOCYTES # BLD AUTO: 2 K/UL
LYMPHOCYTES NFR BLD AUTO: 41.2 %
MAN DIFF?: NORMAL
MCHC RBC-ENTMCNC: 26.4 PG
MCHC RBC-ENTMCNC: 30.8 GM/DL
MCV RBC AUTO: 85.7 FL
MONOCYTES # BLD AUTO: 0.41 K/UL
MONOCYTES NFR BLD AUTO: 8.4 %
NEUTROPHILS # BLD AUTO: 2.35 K/UL
NEUTROPHILS NFR BLD AUTO: 48.4 %
PLATELET # BLD AUTO: 319 K/UL
POTASSIUM SERPL-SCNC: 4.3 MMOL/L
PROT SERPL-MCNC: 7.2 G/DL
RBC # BLD: 4.35 M/UL
RBC # FLD: 15.5 %
SODIUM SERPL-SCNC: 141 MMOL/L
TIBC SERPL-MCNC: 361 UG/DL
TRIGL SERPL-MCNC: 77 MG/DL
TSH SERPL-ACNC: 1.88 UIU/ML
UIBC SERPL-MCNC: 329 UG/DL
VIT A SERPL-MCNC: 29.3 UG/DL
VIT B1 SERPL-MCNC: 88.1 NMOL/L
VIT B12 SERPL-MCNC: 857 PG/ML
VIT B6 SERPL-MCNC: 5.8 UG/L
WBC # FLD AUTO: 4.86 K/UL
ZINC SERPL-MCNC: 84 UG/DL

## 2020-10-02 ENCOUNTER — APPOINTMENT (OUTPATIENT)
Dept: PULMONOLOGY | Facility: CLINIC | Age: 45
End: 2020-10-02
Payer: COMMERCIAL

## 2020-10-02 PROCEDURE — ZZZZZ: CPT

## 2020-10-05 LAB — SARS-COV-2 N GENE NPH QL NAA+PROBE: NOT DETECTED

## 2020-10-07 ENCOUNTER — APPOINTMENT (OUTPATIENT)
Dept: PULMONOLOGY | Facility: CLINIC | Age: 45
End: 2020-10-07
Payer: COMMERCIAL

## 2020-10-07 VITALS
OXYGEN SATURATION: 98 % | RESPIRATION RATE: 14 BRPM | SYSTOLIC BLOOD PRESSURE: 130 MMHG | HEART RATE: 90 BPM | DIASTOLIC BLOOD PRESSURE: 70 MMHG

## 2020-10-07 VITALS — WEIGHT: 224 LBS | BODY MASS INDEX: 42.29 KG/M2 | HEIGHT: 61 IN

## 2020-10-07 PROCEDURE — 94010 BREATHING CAPACITY TEST: CPT

## 2020-10-07 PROCEDURE — 90686 IIV4 VACC NO PRSV 0.5 ML IM: CPT

## 2020-10-07 PROCEDURE — 85018 HEMOGLOBIN: CPT | Mod: QW

## 2020-10-07 PROCEDURE — 94729 DIFFUSING CAPACITY: CPT

## 2020-10-07 PROCEDURE — 94727 GAS DIL/WSHOT DETER LNG VOL: CPT

## 2020-10-07 PROCEDURE — G0008: CPT

## 2020-10-07 PROCEDURE — 99214 OFFICE O/P EST MOD 30 MIN: CPT | Mod: 25

## 2020-10-07 RX ORDER — OMEPRAZOLE 20 MG/1
20 CAPSULE, DELAYED RELEASE ORAL
Qty: 28 | Refills: 0 | Status: DISCONTINUED | COMMUNITY
Start: 2020-03-10 | End: 2020-10-07

## 2020-10-07 NOTE — HISTORY OF PRESENT ILLNESS
[Excessive Daytime Sleepiness] : excessive daytime sleepiness [Snoring] : snoring [Sleepy When Sedentary] : sleepy when sedentary [None] : No associated symptoms are reported [Poor Compliance] : poor compliance with treatment [Poor Tolerance] : poor tolerance of treatment [Poor Symptom Control] : poor symptom control [Follow-Up - Routine Clinic] : a routine clinic follow-up of [Excess Weight] : excess weight [Currently Experiencing] : The patient is currently experiencing symptoms. [Dyspnea] : dyspnea [Back Pain] : back pain [FreeTextEntry1] : Patient c/o SOBOE but is otherwise without associated respiratory complaints.\par She presents for evaluation prior to her upcoming bariatric surgery.\par  [Witnessed Apnea During Sleep] : no witnessed apnea during sleep [Witnessed Gasping During Sleep] : no witnessed gasping during sleep [Unrefreshing Sleep] : no unrefreshing sleep [de-identified] : AutoCPAP

## 2020-10-07 NOTE — PROCEDURE
[FreeTextEntry1] : PFTs 1/10/19 - Mild restriction with normal diffusion capacity\par PFTs 10/7/20 - normal and improved from previous

## 2020-10-07 NOTE — DISCUSSION/SUMMARY
[FreeTextEntry1] : \par #1. PFTs performed previously revealed mild restriction likely related to morbid obesity; now PFTs are normal\par #2. The patient does not appear to require chronic BD therapy at this time\par #3. SOBOE is likely related to weight or deconditioning given restriction on PFTs\par #4. Diet and exercise for weight loss\par #5. Continue AutoCPAP to treat moderate FABRICIO; severe in REM sleep. Will adjust pressure and change mask to nasal mask\par #6. Preop CXR from 2/2020 was essentially clear\par #7. F/u in 3 months for re-evaluation with compliance\par #8. There is no pulmonary contraindication for the patient's upcoming bariatric surgery. I would recommend that her autoCPAP should be available to her post op and with sleep. Alternatively, pt could use CPAP at 8 cm of water which is her median pressure.. I would also recommend post op incentive spirometry, GI/DVT prophylaxis as needed, early ambulation as able, and adequate pain control. Would recommend that O2 saturation should be monitored during and after the procedure.\par #9. Reviewed risks of exposure and symptoms of Covid-19 virus, including how the virus is spread.\par \par Discussed the following risk-reducing strategies:\par -Wash hands with soap and water (proper technique reviewed) \par -Use alcohol based  when hand-washing is not an option\par -Maintain a social distance of at least 6 feet whenever possible\par -Avoid contact, especially hand shaking\par -Avoid touching eyes, nose, and mouth\par -Cover face/mouth when coughing or sneezing\par -Avoid close contact with sick people\par -Seek medical help if you develop a fever and/or respiratory symptoms (e.g. cough, chest pain, SOB)\par \par Patient's questions were answered and patient appears to understand these recommendations

## 2020-10-07 NOTE — PHYSICAL EXAM
[No Acute Distress] : no acute distress [Well Nourished] : well nourished [Well Developed] : well developed [Normal Appearance] : normal appearance [Supple] : supple [Normal Rate/Rhythm] : normal rate/rhythm [Normal S1, S2] : normal s1, s2 [No Murmurs] : no murmurs [No Resp Distress] : no resp distress [No Acc Muscle Use] : no acc muscle use [Normal Rhythm and Effort] : normal rhythm and effort [Clear to Auscultation Bilaterally] : clear to auscultation bilaterally [No Abnormalities] : no abnormalities [Benign] : benign [Not Tender] : not tender [Soft] : soft [Normal Gait] : normal gait [No Clubbing] : no clubbing [No Edema] : no edema [FROM] : FROM [No Focal Deficits] : no focal deficits [Oriented x3] : oriented x3 [TextBox_2] : Morbidly obese

## 2020-10-07 NOTE — CONSULT LETTER
[Dear  ___] : Dear  [unfilled], [Consult Letter:] : I had the pleasure of evaluating your patient, [unfilled]. [Please see my note below.] : Please see my note below. [Consult Closing:] : Thank you very much for allowing me to participate in the care of this patient.  If you have any questions, please do not hesitate to contact me. [Sincerely,] : Sincerely, [DrBen  ___] : Dr. HERNANDEZ [FreeTextEntry3] : Jose Arredondo MD, FCCP, D. ABSM\par Pulmonary and Sleep Medicine\par [facility] at [place]

## 2020-10-08 NOTE — H&P ADULT - NSHPPOACENTRALVENOUSCATHETER_GEN_ALL_CORE
Patient called and stated that he received a call that he had to return asap. He stated he has called several times and can not reach anyone. Ketty at [phone]. I also called and no answer. Will call patient back.  
no

## 2020-10-22 NOTE — PATIENT PROFILE ADULT - NSPROGENBLOODRESTRICT_GEN_A_NUR
0808  Arrived from OR s/p   REMOVAL, TISSUE EXPANDER - REMOVAL BREAST Left   VSS.  Oral airway in place.    0821 Awoke.  Airway removed    0830  called and updated on condition.     none

## 2020-11-03 ENCOUNTER — APPOINTMENT (OUTPATIENT)
Dept: INTERNAL MEDICINE | Facility: CLINIC | Age: 45
End: 2020-11-03
Payer: COMMERCIAL

## 2020-11-03 VITALS
SYSTOLIC BLOOD PRESSURE: 132 MMHG | DIASTOLIC BLOOD PRESSURE: 88 MMHG | HEIGHT: 61 IN | TEMPERATURE: 98.8 F | RESPIRATION RATE: 15 BRPM | BODY MASS INDEX: 42.86 KG/M2 | OXYGEN SATURATION: 98 % | HEART RATE: 95 BPM | WEIGHT: 227 LBS

## 2020-11-03 PROCEDURE — 99214 OFFICE O/P EST MOD 30 MIN: CPT | Mod: 25

## 2020-11-03 PROCEDURE — 96127 BRIEF EMOTIONAL/BEHAV ASSMT: CPT

## 2020-11-03 PROCEDURE — 99072 ADDL SUPL MATRL&STAF TM PHE: CPT

## 2020-11-03 NOTE — ASSESSMENT
[FreeTextEntry1] : Morbid obesity/Pre-DM:\par -BMI 42.89\par -HgA1c 6.0 2020\par -medical necessity letter written for bariatric surgery\par -risks of obesity discussed\par -benefits of weight loss discussed\par -low fat/low chol diet and low carbohydrate diet advised\par -small portion sizes encouraged\par -I advised avoidance of sugary drinks\par -aerobic exercise encouraged\par -weight loss advised\par \par FABRICIO on CPAP\par -followed by Pulmonary\par -weight loss advised\par \par H Pylori infection s/p tx\par -f/u breath test negative 2020\par \par Vitamin D deficiency:\par -she states she is being treated with vitamin D3 4000 uints BID\par -will repeat vitamin D level again in 3 months\par \par BP today 132/88\par -I advised low fat/low cholesterol diet, low salt diet, and weight loss\par -f/u 3 months for BP check\par \par CPE\par \par Depression screenin/3/2020 PHQ 2 score 0\par \par Flu shot: 10/2020\par \par HIV testing: offered 11/3/2020-she declined\par \par Mammogram: ordered today\par \par GYN/PAP: advised and referred today\par \par Covid 19 ab 2020 negative\par \par F/U 3 months\par

## 2020-11-03 NOTE — HISTORY OF PRESENT ILLNESS
[de-identified] : This is a former pt of Dr Abdalla\par \par She is here today because she needs letter of medical necessity for bariatric surgery.  She states she has been obese her whole life.  She has tried to lose weight for many years without success.  She has tried various diet (low carb, keto, slim fast) and exercise with little success.  She has decided to pursue bariatric surgery\par \par She states she feels well today\par \par She has seen Pulmonary and cardiology.  She denies chest pain, SOB, palpitations

## 2020-11-03 NOTE — PHYSICAL EXAM
[No Acute Distress] : no acute distress [Well Nourished] : well nourished [Well Developed] : well developed [Well-Appearing] : well-appearing [Normal Sclera/Conjunctiva] : normal sclera/conjunctiva [PERRL] : pupils equal round and reactive to light [Normal Oropharynx] : the oropharynx was normal [No JVD] : no jugular venous distention [No Lymphadenopathy] : no lymphadenopathy [Supple] : supple [Thyroid Normal, No Nodules] : the thyroid was normal and there were no nodules present [No Respiratory Distress] : no respiratory distress  [No Accessory Muscle Use] : no accessory muscle use [Clear to Auscultation] : lungs were clear to auscultation bilaterally [Normal Rate] : normal rate  [Regular Rhythm] : with a regular rhythm [Normal S1, S2] : normal S1 and S2 [No Murmur] : no murmur heard [No Edema] : there was no peripheral edema [No Extremity Clubbing/Cyanosis] : no extremity clubbing/cyanosis [Soft] : abdomen soft [Non Tender] : non-tender [Non-distended] : non-distended [No Masses] : no abdominal mass palpated [No HSM] : no HSM [Normal Bowel Sounds] : normal bowel sounds [Normal Posterior Cervical Nodes] : no posterior cervical lymphadenopathy [Normal Anterior Cervical Nodes] : no anterior cervical lymphadenopathy [No Spinal Tenderness] : no spinal tenderness [No Rash] : no rash [No Focal Deficits] : no focal deficits [Normal Affect] : the affect was normal [Normal Insight/Judgement] : insight and judgment were intact [Normal Voice/Communication] : normal voice/communication [Alert and Oriented x3] : oriented to person, place, and time [Normal Mood] : the mood was normal [de-identified] : Obese

## 2020-11-08 ENCOUNTER — TRANSCRIPTION ENCOUNTER (OUTPATIENT)
Age: 45
End: 2020-11-08

## 2020-11-11 ENCOUNTER — APPOINTMENT (OUTPATIENT)
Dept: ORTHOPEDIC SURGERY | Facility: CLINIC | Age: 45
End: 2020-11-11

## 2020-11-13 ENCOUNTER — NON-APPOINTMENT (OUTPATIENT)
Age: 45
End: 2020-11-13

## 2020-11-25 ENCOUNTER — APPOINTMENT (OUTPATIENT)
Dept: BARIATRICS | Facility: CLINIC | Age: 45
End: 2020-11-25
Payer: COMMERCIAL

## 2020-11-25 VITALS
WEIGHT: 226.41 LBS | DIASTOLIC BLOOD PRESSURE: 80 MMHG | HEART RATE: 88 BPM | BODY MASS INDEX: 42.75 KG/M2 | OXYGEN SATURATION: 100 % | TEMPERATURE: 96.7 F | HEIGHT: 61 IN | SYSTOLIC BLOOD PRESSURE: 128 MMHG

## 2020-11-25 PROCEDURE — 99215 OFFICE O/P EST HI 40 MIN: CPT

## 2020-11-25 NOTE — PHYSICAL EXAM
[Obese, well nourished, in no acute distress] : obese, well nourished, in no acute distress [Normal] : affect appropriate [de-identified] : Obese, soft, nontender, nondistended, positive bowel sounds in all four quadrants.  No hernia or masses.

## 2020-11-25 NOTE — ASSESSMENT
[FreeTextEntry1] : Checklist complete.\par Proceed with scheduling surgery 12/23\par \par ONce agin, Risk, Benefits, and Alternatives to surgery have been discussed.  This includes but is not limited to bleeding, infection, damage to adjacent structures, need for additional surgery or interventions, adverse effects of anesthesia such as cardio-respiratory complications, prolonged intubation, cardiac arrhythmia, arrest, and or death.  Risks of forgoing surgery have also been discussed including progression of, and/or worsening of current condition which may then require urgent or emergent treatment or surgery.\par \par Nutritional counseling has been provided. 2 week pre op modified liquid diet reviewed as well as post operative dietary progression.  All questions answered.  The patient is encouraged to remain calorie conscious and continue a low fat, low carbohydrate, high protein diet. Also, emphasis has been placed on the importance of adequate hydration, multi-vitamin supplementation and exercise.  (15 min)\par \par Routine PST and COVID screening to be arranged.\par \par f/u Post operatively.\par

## 2020-11-25 NOTE — HISTORY OF PRESENT ILLNESS
[de-identified] : Final pre op assessment in anticipation of SLeeve Gastrectomy.\par Checklist complete

## 2020-12-08 ENCOUNTER — LABORATORY RESULT (OUTPATIENT)
Age: 45
End: 2020-12-08

## 2020-12-08 ENCOUNTER — APPOINTMENT (OUTPATIENT)
Dept: OBGYN | Facility: CLINIC | Age: 45
End: 2020-12-08
Payer: COMMERCIAL

## 2020-12-08 VITALS
SYSTOLIC BLOOD PRESSURE: 130 MMHG | DIASTOLIC BLOOD PRESSURE: 80 MMHG | TEMPERATURE: 98.6 F | HEIGHT: 61 IN | BODY MASS INDEX: 42.48 KG/M2 | WEIGHT: 225 LBS

## 2020-12-08 DIAGNOSIS — Z78.9 OTHER SPECIFIED HEALTH STATUS: ICD-10-CM

## 2020-12-08 LAB
HCG UR QL: POSITIVE
QUALITY CONTROL: YES

## 2020-12-08 PROCEDURE — 99213 OFFICE O/P EST LOW 20 MIN: CPT | Mod: 25

## 2020-12-08 PROCEDURE — 99396 PREV VISIT EST AGE 40-64: CPT

## 2020-12-08 PROCEDURE — 36415 COLL VENOUS BLD VENIPUNCTURE: CPT

## 2020-12-08 PROCEDURE — 99072 ADDL SUPL MATRL&STAF TM PHE: CPT

## 2020-12-08 RX ORDER — VITAMIN C, CALCIUM, IRON, VITAMIN D3, VITAMIN E, THIAMIN, RIBOFLAVIN, NIACINAMIDE, VITAMIN B6, FOLIC ACID, IODINE, ZINC, COPPER, DOCUSATE SODIUM 120; 85; 30; 3; 20; 20; 1; 25; 2; 50; 159; 4.54; 150; 5; 400; 3.4 MG/1; MG/1; [IU]/1; MG/1; MG/1; MG/1; MG/1; MG/1; MG/1; MG/1; MG/1; MG/1; UG/1; MG/1; [IU]/1; MG/1
90-1 & 300 TABLET ORAL
Qty: 90 | Refills: 3 | Status: DISCONTINUED | COMMUNITY
Start: 2020-12-08 | End: 2020-12-08

## 2020-12-08 NOTE — DISCUSSION/SUMMARY
[FreeTextEntry1] : 1) Rx for prenatal issued\par 2) We discussed avoiding simple sugars and packaged foods 2nd to elevated sodium levels as her blood pressure today and for the past 3 months is noted to be elevated and she is already prediabetic\par 3) Pap, STD and vaginal cultures performed today\par 4) Hcg, progesterone, cbc and thyroid panel drawn today. She was advised to f/u on 12/10/20 for repeat hcg and progesterone to monitor progression\par 5) she was advised to return to the office in one week for pregnancy confirmation with MK\par 6) Call parameters reviewed such as vaginal bleeding and pelvic pain\par 7) Rx for screening mammogram deferred\par \par 30 minutes were spent face to face in this visit with greater than 50% of the time spent counseling\par \par

## 2020-12-08 NOTE — PHYSICAL EXAM
[Appropriately responsive] : appropriately responsive [Alert] : alert [No Acute Distress] : no acute distress [No Lymphadenopathy] : no lymphadenopathy [Oriented x3] : oriented x3 [Examination Of The Breasts] : a normal appearance [No Discharge] : no discharge [No Masses] : no breast masses were palpable [Labia Majora] : normal [Labia Minora] : normal [Normal] : normal [FreeTextEntry6] : unable to evaluate 2nd to habitus

## 2020-12-08 NOTE — HISTORY OF PRESENT ILLNESS
[TextBox_4] : Edita is a 44 y/o morbidly obese  (1 TOP, x2 MABs in 2019) who presents today for an annual exam. She reports her LMP as 10/30/20.  She has had one positive pregnancy test at home about 2 weeks ago and in-office urine pregnancy test today is also positive. She was scheduled for bariatric surgery on 20 and pre-surgical testing from 20 shows an HCG levels of 660 along with an elevated HgA1c of 6.2. She states she has been told she is pre-diabetic and is not taking any medication.\par \par She denies any bleeding or pelvic pain since her LMP.  Her previous deliveries were an  and a c/s 2nd to breech presentation.  She reports her first child was born premature at 28 weeks, no cause identified.  She denies any post-partum issues. She is not on any medications. Her last mammogram was 618/19 and was normal.\par \par she states prior to her LMP her menses were regular, q/28 days.\par \par she is not taking a prenatal vitamin\par \par Her BP today is 130/80 and has been in this range since 2020 [FreeTextEntry1] : 10/30/20

## 2020-12-09 ENCOUNTER — NON-APPOINTMENT (OUTPATIENT)
Age: 45
End: 2020-12-09

## 2020-12-09 LAB
BASOPHILS # BLD AUTO: 0.03 K/UL
BASOPHILS NFR BLD AUTO: 0.4 %
C TRACH RRNA SPEC QL NAA+PROBE: NOT DETECTED
CANDIDA VAG CYTO: NOT DETECTED
EOSINOPHIL # BLD AUTO: 0.11 K/UL
EOSINOPHIL NFR BLD AUTO: 1.6 %
G VAGINALIS+PREV SP MTYP VAG QL MICRO: NOT DETECTED
HCG SERPL-MCNC: 3790 MIU/ML
HCT VFR BLD CALC: 37.9 %
HGB BLD-MCNC: 11.9 G/DL
IMM GRANULOCYTES NFR BLD AUTO: 0.3 %
LYMPHOCYTES # BLD AUTO: 1.96 K/UL
LYMPHOCYTES NFR BLD AUTO: 28.5 %
MAN DIFF?: NORMAL
MCHC RBC-ENTMCNC: 26.6 PG
MCHC RBC-ENTMCNC: 31.4 GM/DL
MCV RBC AUTO: 84.8 FL
MONOCYTES # BLD AUTO: 0.62 K/UL
MONOCYTES NFR BLD AUTO: 9 %
N GONORRHOEA RRNA SPEC QL NAA+PROBE: NOT DETECTED
NEUTROPHILS # BLD AUTO: 4.14 K/UL
NEUTROPHILS NFR BLD AUTO: 60.2 %
PLATELET # BLD AUTO: 390 K/UL
PROGEST SERPL-MCNC: 20.1 NG/ML
PROGEST SERPL-MCNC: 20.2 NG/ML
RBC # BLD: 4.47 M/UL
RBC # FLD: 18.2 %
SOURCE TP AMPLIFICATION: NORMAL
T VAGINALIS VAG QL WET PREP: NOT DETECTED
T3 SERPL-MCNC: 121 NG/DL
T3FREE SERPL-MCNC: 2.73 PG/ML
T4 FREE SERPL-MCNC: 1 NG/DL
T4 SERPL-MCNC: 8.1 UG/DL
THYROPEROXIDASE AB SERPL IA-ACNC: 91.4 IU/ML
TSH SERPL-ACNC: 4.76 UIU/ML
WBC # FLD AUTO: 6.88 K/UL

## 2020-12-10 ENCOUNTER — NON-APPOINTMENT (OUTPATIENT)
Age: 45
End: 2020-12-10

## 2020-12-10 LAB
HCG SERPL-MCNC: 5829 MIU/ML
THYROGLOB AB SERPL-ACNC: <20 IU/ML
THYROPEROXIDASE AB SERPL IA-ACNC: 90.9 IU/ML

## 2020-12-14 ENCOUNTER — APPOINTMENT (OUTPATIENT)
Dept: INTERNAL MEDICINE | Facility: CLINIC | Age: 45
End: 2020-12-14

## 2020-12-15 ENCOUNTER — NON-APPOINTMENT (OUTPATIENT)
Age: 45
End: 2020-12-15

## 2020-12-15 ENCOUNTER — APPOINTMENT (OUTPATIENT)
Dept: OBGYN | Facility: CLINIC | Age: 45
End: 2020-12-15
Payer: COMMERCIAL

## 2020-12-15 ENCOUNTER — APPOINTMENT (OUTPATIENT)
Dept: ENDOCRINOLOGY | Facility: CLINIC | Age: 45
End: 2020-12-15
Payer: COMMERCIAL

## 2020-12-15 VITALS
SYSTOLIC BLOOD PRESSURE: 110 MMHG | HEART RATE: 88 BPM | TEMPERATURE: 98.7 F | WEIGHT: 226 LBS | HEIGHT: 61 IN | DIASTOLIC BLOOD PRESSURE: 78 MMHG | RESPIRATION RATE: 15 BRPM | OXYGEN SATURATION: 99 % | BODY MASS INDEX: 42.67 KG/M2

## 2020-12-15 VITALS
BODY MASS INDEX: 44.37 KG/M2 | HEIGHT: 60 IN | SYSTOLIC BLOOD PRESSURE: 110 MMHG | DIASTOLIC BLOOD PRESSURE: 70 MMHG | WEIGHT: 226 LBS

## 2020-12-15 LAB — HPV HIGH+LOW RISK DNA PNL CVX: DETECTED

## 2020-12-15 PROCEDURE — 99204 OFFICE O/P NEW MOD 45 MIN: CPT

## 2020-12-15 PROCEDURE — 99072 ADDL SUPL MATRL&STAF TM PHE: CPT

## 2020-12-15 PROCEDURE — 76830 TRANSVAGINAL US NON-OB: CPT

## 2020-12-15 PROCEDURE — 99213 OFFICE O/P EST LOW 20 MIN: CPT | Mod: 25

## 2020-12-15 RX ORDER — LEVOTHYROXINE SODIUM 0.03 MG/1
25 TABLET ORAL
Refills: 0 | Status: DISCONTINUED | COMMUNITY
End: 2020-12-15

## 2020-12-15 NOTE — PROCEDURE
[Transvaginal OB Sonogram] : Transvaginal OB Sonogram [Transabdominal OB Sonogram] : Transabdominal OB Sonogram [Intrauterine Pregnancy] : intrauterine pregnancy [Yolk Sac] : yolk sac present [Fetal Heart] : no fetal heart [Transvaginal OB Sonogram WNL] : Transvaginal OB Sonogram - abnormalities noted [FreeTextEntry1] : Transvaginal and transabdominal OB ultrasound performed: An intrauterine gestational sac is seen, there is a question of a yolk sac and fetal pole eccentrically located in the gestational sac. However due to the patient's morbid obesity imaging is difficult. Patient will be sent to radiology center for further imaging.

## 2020-12-15 NOTE — REVIEW OF SYSTEMS
[Fatigue] : fatigue [Cold Intolerance] : cold intolerance [Negative] : Psychiatric [Decreased Appetite] : appetite not decreased [Recent Weight Gain (___ Lbs)] : no recent weight gain

## 2020-12-15 NOTE — HISTORY OF PRESENT ILLNESS
[FreeTextEntry1] : Patient is a 45-year-old female who presents with complaint of amenorrhea. Patient's last menstrual period was 2020 but with patient approximately 7 weeks gestation. Patient recently placed on levothyroxine for a mildly elevated TSH. Patient does have thyroid antibodies. Patient with a history of 2 previous miscarriages one  section within normal delivery. Patient is obese her weight is 226 pounds

## 2020-12-15 NOTE — HISTORY OF PRESENT ILLNESS
[FreeTextEntry1] : This 45 yofemalel with no past hx of hypothyroid was found to have a TSH of 4.76. She is currently 6 wks pregnant.\par The pt denies any PMH of thyroid disease. There is no FH of thyroid disease.\par She currently feels well and has no fatiuge, weakness or change in appetite.\par  She has had  2 misscarriges  in 2019

## 2020-12-15 NOTE — CONSULT LETTER
[Consult Letter:] : I had the pleasure of evaluating your patient, [unfilled]. [Consult Closing:] : Thank you very much for allowing me to participate in the care of this patient.  If you have any questions, please do not hesitate to contact me.

## 2020-12-15 NOTE — PHYSICAL EXAM
[Alert] : alert [No Acute Distress] : no acute distress [Normal Sclera/Conjunctiva] : normal sclera/conjunctiva [Normal Outer Ear/Nose] : the ears and nose were normal in appearance [No Neck Mass] : no neck mass was observed [No Thyroid Nodules] : no palpable thyroid nodules [Regular Rhythm] : with a regular rhythm [Normal Gait] : normal gait [No Rash] : no rash [No Tremors] : no tremors [Oriented x3] : oriented to person, place, and time [Kyphosis] : no kyphosis present [de-identified] : skin dry

## 2020-12-15 NOTE — ASSESSMENT
[FreeTextEntry1] : The pt has a mild elevation in her TSH with nl T4 and T3. But with a hx of 2 early misscarrages.\par In view of this , I would start T4 25 mcg qd and repeat her TFT in 6 wks.\par  I explained the dx of hypothyroid to the patient and she agrees to our plan of treatment.\par Will repeat TFT in 6 wks\par Ret in 3 months

## 2020-12-16 ENCOUNTER — RESULT REVIEW (OUTPATIENT)
Age: 45
End: 2020-12-16

## 2020-12-16 ENCOUNTER — APPOINTMENT (OUTPATIENT)
Dept: ULTRASOUND IMAGING | Facility: CLINIC | Age: 45
End: 2020-12-16
Payer: COMMERCIAL

## 2020-12-16 ENCOUNTER — OUTPATIENT (OUTPATIENT)
Dept: OUTPATIENT SERVICES | Facility: HOSPITAL | Age: 45
LOS: 1 days | End: 2020-12-16

## 2020-12-16 DIAGNOSIS — E03.9 HYPOTHYROIDISM, UNSPECIFIED: ICD-10-CM

## 2020-12-16 DIAGNOSIS — O34.219 MATERNAL CARE FOR UNSPECIFIED TYPE SCAR FROM PREVIOUS CESAREAN DELIVERY: Chronic | ICD-10-CM

## 2020-12-16 LAB
HCG SERPL-MCNC: ABNORMAL MIU/ML
PROGEST SERPL-MCNC: 18.4 NG/ML

## 2020-12-16 PROCEDURE — 76830 TRANSVAGINAL US NON-OB: CPT | Mod: 26

## 2020-12-16 PROCEDURE — 76856 US EXAM PELVIC COMPLETE: CPT | Mod: 26

## 2020-12-17 LAB
ALBUMIN SERPL ELPH-MCNC: 4 G/DL
ALP BLD-CCNC: 70 U/L
ALT SERPL-CCNC: 14 U/L
ANION GAP SERPL CALC-SCNC: 11 MMOL/L
AST SERPL-CCNC: 13 U/L
BASOPHILS # BLD AUTO: 0.02 K/UL
BASOPHILS NFR BLD AUTO: 0.4 %
BILIRUB SERPL-MCNC: 0.3 MG/DL
BUN SERPL-MCNC: 8 MG/DL
CALCIUM SERPL-MCNC: 9.2 MG/DL
CHLORIDE SERPL-SCNC: 103 MMOL/L
CO2 SERPL-SCNC: 25 MMOL/L
CREAT SERPL-MCNC: 0.59 MG/DL
EOSINOPHIL # BLD AUTO: 0.1 K/UL
EOSINOPHIL NFR BLD AUTO: 1.8 %
ESTIMATED AVERAGE GLUCOSE: 131 MG/DL
GLUCOSE SERPL-MCNC: 102 MG/DL
HBA1C MFR BLD HPLC: 6.2 %
HCG SERPL QL: POSITIVE
HCT VFR BLD CALC: 34.3 %
HGB BLD-MCNC: 11.1 G/DL
IMM GRANULOCYTES NFR BLD AUTO: 0.2 %
LYMPHOCYTES # BLD AUTO: 1.64 K/UL
LYMPHOCYTES NFR BLD AUTO: 30 %
MAN DIFF?: NORMAL
MCHC RBC-ENTMCNC: 26.6 PG
MCHC RBC-ENTMCNC: 32.4 GM/DL
MCV RBC AUTO: 82.3 FL
MONOCYTES # BLD AUTO: 0.45 K/UL
MONOCYTES NFR BLD AUTO: 8.2 %
NEUTROPHILS # BLD AUTO: 3.24 K/UL
NEUTROPHILS NFR BLD AUTO: 59.4 %
PAPP-A SERPL-ACNC: 660 MIU/ML
PLATELET # BLD AUTO: 351 K/UL
POTASSIUM SERPL-SCNC: 4.1 MMOL/L
PROT SERPL-MCNC: 7 G/DL
RBC # BLD: 4.17 M/UL
RBC # FLD: 17.3 %
SARS-COV-2 IGG SERPL IA-ACNC: <0.1 INDEX
SARS-COV-2 IGG SERPL QL IA: NEGATIVE
SODIUM SERPL-SCNC: 138 MMOL/L
WBC # FLD AUTO: 5.46 K/UL

## 2020-12-18 ENCOUNTER — NON-APPOINTMENT (OUTPATIENT)
Age: 45
End: 2020-12-18

## 2020-12-18 LAB
HCG SERPL-MCNC: ABNORMAL MIU/ML
PROGEST SERPL-MCNC: 18.1 NG/ML

## 2020-12-21 ENCOUNTER — APPOINTMENT (OUTPATIENT)
Dept: OBGYN | Facility: CLINIC | Age: 45
End: 2020-12-21
Payer: COMMERCIAL

## 2020-12-21 ENCOUNTER — NON-APPOINTMENT (OUTPATIENT)
Age: 45
End: 2020-12-21

## 2020-12-21 DIAGNOSIS — Z87.42 PERSONAL HISTORY OF OTHER DISEASES OF THE FEMALE GENITAL TRACT: ICD-10-CM

## 2020-12-21 DIAGNOSIS — N91.2 AMENORRHEA, UNSPECIFIED: ICD-10-CM

## 2020-12-21 LAB
BILIRUB UR QL STRIP: NORMAL
GLUCOSE UR-MCNC: NORMAL
HCG UR QL: 0.2 EU/DL
HGB UR QL STRIP.AUTO: NORMAL
KETONES UR-MCNC: NORMAL
LEUKOCYTE ESTERASE UR QL STRIP: NORMAL
NITRITE UR QL STRIP: NORMAL
PH UR STRIP: 6.5
PROT UR STRIP-MCNC: NORMAL
SP GR UR STRIP: 1.01
T3FREE SERPL-MCNC: 2.61 PG/ML
T4 FREE SERPL-MCNC: 1 NG/DL
TSH SERPL-ACNC: 3.21 UIU/ML

## 2020-12-21 PROCEDURE — 99072 ADDL SUPL MATRL&STAF TM PHE: CPT

## 2020-12-21 PROCEDURE — 99213 OFFICE O/P EST LOW 20 MIN: CPT | Mod: 25

## 2020-12-21 PROCEDURE — 76830 TRANSVAGINAL US NON-OB: CPT

## 2020-12-21 NOTE — HISTORY OF PRESENT ILLNESS
[FreeTextEntry1] : Patient is a 45-year-old female that presents for complaints of amenorrhea. Patient states her last menstrual period was October 26 however she is not sure. Patient with a history of a recent previous miscarriage. Patient recently started on levothyroxine for a slightly elevated TSH. Patient had a ultrasound one week ago where a fetal pole was not clearly identified in the intrauterine gestational sac. Patient had proper progression of her hCG. Patient is placed on daily progesterone 200 mg suppositories and daily baby aspirin

## 2020-12-21 NOTE — PROCEDURE
[Transvaginal OB Sonogram] : Transvaginal OB Sonogram [Intrauterine Pregnancy] : intrauterine pregnancy [Yolk Sac] : yolk sac present [Fetal Heart] : fetal heart present [CRL: ___ (mm)] : CRL - [unfilled]Umm [Date: ___] : Date: [unfilled] [Current GA by Sonogram: ___ (wks)] : Current GA by Sonogram: [unfilled]Uwks [___ day(s)] : [unfilled] days [FreeTextEntry1] : Transvaginal OB ultrasound performed: A viable duran intrauterine gestation is noted, fetal heart motion is seen, round umbilicus consistent with 6 weeks and 3 days gestation giving the patient estimated date of confinement of August 13, 2021

## 2020-12-23 ENCOUNTER — APPOINTMENT (OUTPATIENT)
Dept: BARIATRICS | Facility: HOSPITAL | Age: 45
End: 2020-12-23

## 2020-12-31 ENCOUNTER — APPOINTMENT (OUTPATIENT)
Dept: BARIATRICS | Facility: CLINIC | Age: 45
End: 2020-12-31

## 2021-01-04 ENCOUNTER — APPOINTMENT (OUTPATIENT)
Dept: OBGYN | Facility: CLINIC | Age: 46
End: 2021-01-04
Payer: COMMERCIAL

## 2021-01-04 ENCOUNTER — NON-APPOINTMENT (OUTPATIENT)
Age: 46
End: 2021-01-04

## 2021-01-04 VITALS
DIASTOLIC BLOOD PRESSURE: 70 MMHG | WEIGHT: 230 LBS | SYSTOLIC BLOOD PRESSURE: 110 MMHG | BODY MASS INDEX: 45.16 KG/M2 | HEIGHT: 60 IN

## 2021-01-04 PROCEDURE — 76830 TRANSVAGINAL US NON-OB: CPT

## 2021-01-04 PROCEDURE — 0501F PRENATAL FLOW SHEET: CPT

## 2021-01-04 PROCEDURE — 36415 COLL VENOUS BLD VENIPUNCTURE: CPT

## 2021-01-04 NOTE — PROCEDURE
[Intrauterine Pregnancy] : intrauterine pregnancy [Yolk Sac] : yolk sac present [Fetal Heart] : fetal heart present [CRL: ___ (mm)] : CRL - [unfilled]Umm [Date: ___] : EDC: [unfilled] [Current GA by Sonogram: ___ (wks)] : Current GA by Sonogram: [unfilled]Uwks [___ day(s)] : [unfilled] days [WNL] : Transvaginal OB Sonogram WNL [FreeTextEntry1] : Transvaginal OB ultrasound performed: A viable duran intrauterine gestation is seen, fetal heart motion is noted, crown-rump length consistent with 8 weeks and 6 days gestation

## 2021-01-05 ENCOUNTER — NON-APPOINTMENT (OUTPATIENT)
Age: 46
End: 2021-01-05

## 2021-01-05 LAB
ABO + RH PNL BLD: NORMAL
APPEARANCE: ABNORMAL
BACTERIA: ABNORMAL
BASOPHILS # BLD AUTO: 0.03 K/UL
BASOPHILS NFR BLD AUTO: 0.4 %
BILIRUBIN URINE: NEGATIVE
BLD GP AB SCN SERPL QL: NORMAL
BLOOD URINE: NORMAL
CMV IGG SERPL QL: >10 U/ML
CMV IGG SERPL-IMP: POSITIVE
CMV IGM SERPL QL: <8 AU/ML
CMV IGM SERPL QL: NEGATIVE
COLOR: YELLOW
EOSINOPHIL # BLD AUTO: 0.07 K/UL
EOSINOPHIL NFR BLD AUTO: 1 %
GLUCOSE QUALITATIVE U: NEGATIVE
HBV SURFACE AG SER QL: NONREACTIVE
HCT VFR BLD CALC: 39.9 %
HCV AB SER QL: NONREACTIVE
HCV S/CO RATIO: 0.09 S/CO
HGB A MFR BLD: 97.7 %
HGB A2 MFR BLD: 2.3 %
HGB BLD-MCNC: 12.7 G/DL
HGB FRACT BLD-IMP: NORMAL
HIV1+2 AB SPEC QL IA.RAPID: NONREACTIVE
HYALINE CASTS: 2 /LPF
IMM GRANULOCYTES NFR BLD AUTO: 0.4 %
KETONES URINE: NEGATIVE
LEUKOCYTE ESTERASE URINE: NEGATIVE
LYMPHOCYTES # BLD AUTO: 1.68 K/UL
LYMPHOCYTES NFR BLD AUTO: 23.8 %
MAN DIFF?: NORMAL
MCHC RBC-ENTMCNC: 28 PG
MCHC RBC-ENTMCNC: 31.8 GM/DL
MCV RBC AUTO: 87.9 FL
MEV IGG FLD QL IA: >300 AU/ML
MEV IGG+IGM SER-IMP: POSITIVE
MICROSCOPIC-UA: NORMAL
MONOCYTES # BLD AUTO: 0.77 K/UL
MONOCYTES NFR BLD AUTO: 10.9 %
NEUTROPHILS # BLD AUTO: 4.47 K/UL
NEUTROPHILS NFR BLD AUTO: 63.5 %
NITRITE URINE: NEGATIVE
PH URINE: 7
PLATELET # BLD AUTO: 298 K/UL
PROGEST SERPL-MCNC: 24.7 NG/ML
PROTEIN URINE: NORMAL
RBC # BLD: 4.54 M/UL
RBC # FLD: 19.9 %
RED BLOOD CELLS URINE: 4 /HPF
RUBV IGG FLD-ACNC: 19.3 INDEX
RUBV IGG SER-IMP: POSITIVE
SARS-COV-2 IGG SERPL IA-ACNC: 0.07 INDEX
SARS-COV-2 IGG SERPL QL IA: NEGATIVE
SPECIFIC GRAVITY URINE: 1.03
SQUAMOUS EPITHELIAL CELLS: 25 /HPF
T GONDII AB SER-IMP: NEGATIVE
T GONDII AB SER-IMP: NEGATIVE
T GONDII IGG SER QL: <3 IU/ML
T GONDII IGM SER QL: <3 AU/ML
T PALLIDUM AB SER QL IA: NEGATIVE
UROBILINOGEN URINE: NORMAL
VZV AB TITR SER: POSITIVE
VZV IGG SER IF-ACNC: 1866 INDEX
WBC # FLD AUTO: 7.05 K/UL
WHITE BLOOD CELLS URINE: 2 /HPF

## 2021-01-05 RX ORDER — PROGESTERONE 90 MG/1.125G
8 GEL VAGINAL DAILY
Qty: 2 | Refills: 0 | Status: DISCONTINUED | COMMUNITY
Start: 2020-12-15 | End: 2021-01-05

## 2021-01-06 ENCOUNTER — LABORATORY RESULT (OUTPATIENT)
Age: 46
End: 2021-01-06

## 2021-01-06 LAB
B19V IGG SER QL IA: 9.48 INDEX
B19V IGG+IGM SER-IMP: NORMAL
B19V IGG+IGM SER-IMP: POSITIVE
B19V IGM FLD-ACNC: 0.13 INDEX
B19V IGM SER-ACNC: NEGATIVE
BACTERIA UR CULT: NORMAL

## 2021-01-11 LAB — CFTR MUT TESTED BLD/T: NEGATIVE

## 2021-01-26 ENCOUNTER — APPOINTMENT (OUTPATIENT)
Dept: ANTEPARTUM | Facility: CLINIC | Age: 46
End: 2021-01-26
Payer: COMMERCIAL

## 2021-01-26 ENCOUNTER — NON-APPOINTMENT (OUTPATIENT)
Age: 46
End: 2021-01-26

## 2021-01-26 ENCOUNTER — ASOB RESULT (OUTPATIENT)
Age: 46
End: 2021-01-26

## 2021-01-26 VITALS — DIASTOLIC BLOOD PRESSURE: 66 MMHG | SYSTOLIC BLOOD PRESSURE: 108 MMHG

## 2021-01-26 PROCEDURE — 99072 ADDL SUPL MATRL&STAF TM PHE: CPT

## 2021-01-26 PROCEDURE — 76813 OB US NUCHAL MEAS 1 GEST: CPT | Mod: 59

## 2021-01-29 ENCOUNTER — NON-APPOINTMENT (OUTPATIENT)
Age: 46
End: 2021-01-29

## 2021-02-01 ENCOUNTER — APPOINTMENT (OUTPATIENT)
Dept: OBGYN | Facility: CLINIC | Age: 46
End: 2021-02-01

## 2021-02-03 ENCOUNTER — APPOINTMENT (OUTPATIENT)
Dept: INTERNAL MEDICINE | Facility: CLINIC | Age: 46
End: 2021-02-03

## 2021-02-04 ENCOUNTER — APPOINTMENT (OUTPATIENT)
Dept: OBGYN | Facility: CLINIC | Age: 46
End: 2021-02-04
Payer: COMMERCIAL

## 2021-02-04 ENCOUNTER — NON-APPOINTMENT (OUTPATIENT)
Age: 46
End: 2021-02-04

## 2021-02-04 ENCOUNTER — APPOINTMENT (OUTPATIENT)
Dept: BARIATRICS | Facility: CLINIC | Age: 46
End: 2021-02-04

## 2021-02-04 VITALS
TEMPERATURE: 97.9 F | BODY MASS INDEX: 45.35 KG/M2 | SYSTOLIC BLOOD PRESSURE: 108 MMHG | DIASTOLIC BLOOD PRESSURE: 70 MMHG | RESPIRATION RATE: 16 BRPM | WEIGHT: 231 LBS | HEART RATE: 72 BPM | HEIGHT: 60 IN

## 2021-02-04 PROCEDURE — 0502F SUBSEQUENT PRENATAL CARE: CPT

## 2021-02-04 PROCEDURE — 81003 URINALYSIS AUTO W/O SCOPE: CPT | Mod: QW

## 2021-02-26 ENCOUNTER — APPOINTMENT (OUTPATIENT)
Dept: BARIATRICS/WEIGHT MGMT | Facility: CLINIC | Age: 46
End: 2021-02-26

## 2021-03-03 LAB
BILIRUB UR QL STRIP: NORMAL
CLARITY UR: NORMAL
COLLECTION METHOD: NORMAL
GLUCOSE UR-MCNC: NORMAL
HCG UR QL: 0.2 EU/DL
HGB UR QL STRIP.AUTO: NORMAL
KETONES UR-MCNC: NORMAL
LEUKOCYTE ESTERASE UR QL STRIP: NORMAL
NITRITE UR QL STRIP: NORMAL
PH UR STRIP: 6
PROT UR STRIP-MCNC: NORMAL
SP GR UR STRIP: 1.02

## 2021-03-04 ENCOUNTER — NON-APPOINTMENT (OUTPATIENT)
Age: 46
End: 2021-03-04

## 2021-03-05 ENCOUNTER — NON-APPOINTMENT (OUTPATIENT)
Age: 46
End: 2021-03-05

## 2021-03-05 ENCOUNTER — APPOINTMENT (OUTPATIENT)
Dept: OBGYN | Facility: CLINIC | Age: 46
End: 2021-03-05
Payer: COMMERCIAL

## 2021-03-05 VITALS
WEIGHT: 231 LBS | HEIGHT: 60 IN | OXYGEN SATURATION: 99 % | TEMPERATURE: 97.9 F | HEART RATE: 72 BPM | DIASTOLIC BLOOD PRESSURE: 72 MMHG | BODY MASS INDEX: 45.35 KG/M2 | SYSTOLIC BLOOD PRESSURE: 108 MMHG | RESPIRATION RATE: 16 BRPM

## 2021-03-05 PROCEDURE — 36415 COLL VENOUS BLD VENIPUNCTURE: CPT

## 2021-03-05 PROCEDURE — 0502F SUBSEQUENT PRENATAL CARE: CPT

## 2021-03-09 ENCOUNTER — NON-APPOINTMENT (OUTPATIENT)
Age: 46
End: 2021-03-09

## 2021-03-09 LAB
ADDENDUM DOC: NORMAL
AFP PNL SERPL: NORMAL
AFP SERPL-ACNC: NORMAL
CLINICAL BIOCHEMIST REVIEW: NORMAL
FREE BETA HCG 1ST TRIMESTER: NORMAL
INHIBIN-A 1ST TRIMESTER: NORMAL
Lab: NORMAL
NASAL BONE: PRESENT
NOTES NTD: NORMAL
NT: NORMAL
PAPP-A SERPL-ACNC: NORMAL
PIGF SER-MCNC: NORMAL
TRISOMY 18/3: NORMAL

## 2021-03-15 LAB
1ST TRIMESTER DATA: NORMAL
2ND TRIMESTER DATA: NORMAL
AFP PNL SERPL: NORMAL
AFP SERPL-ACNC: NORMAL
AFP SERPL-ACNC: NORMAL
B-HCG FREE SERPL-MCNC: NORMAL
CLINICAL BIOCHEMIST REVIEW: NORMAL
FREE BETA HCG 1ST TRIMESTER: NORMAL
INHIBIN A SERPL-MCNC: NORMAL
INHIBIN-A 1ST TRIMESTER: NORMAL
NASAL BONE: PRESENT
NOTES NTD: NORMAL
NT: NORMAL
PAPP-A SERPL-ACNC: NORMAL
PIGF SER-MCNC: NORMAL
U ESTRIOL SERPL-SCNC: NORMAL

## 2021-03-16 ENCOUNTER — APPOINTMENT (OUTPATIENT)
Dept: ENDOCRINOLOGY | Facility: CLINIC | Age: 46
End: 2021-03-16
Payer: COMMERCIAL

## 2021-03-16 VITALS
RESPIRATION RATE: 16 BRPM | SYSTOLIC BLOOD PRESSURE: 110 MMHG | HEART RATE: 102 BPM | WEIGHT: 231 LBS | TEMPERATURE: 98.8 F | DIASTOLIC BLOOD PRESSURE: 70 MMHG | HEIGHT: 72 IN | OXYGEN SATURATION: 98 % | BODY MASS INDEX: 31.29 KG/M2

## 2021-03-16 PROCEDURE — 99072 ADDL SUPL MATRL&STAF TM PHE: CPT

## 2021-03-16 PROCEDURE — 99214 OFFICE O/P EST MOD 30 MIN: CPT

## 2021-03-16 NOTE — PHYSICAL EXAM
[Alert] : alert [No Acute Distress] : no acute distress [Normal Sclera/Conjunctiva] : normal sclera/conjunctiva [PERRL] : pupils equal, round and reactive to light [No Proptosis] : no proptosis [No Lid Lag] : no lid lag [Normal Outer Ear/Nose] : the ears and nose were normal in appearance [No Neck Mass] : no neck mass was observed [Thyroid Not Enlarged] : the thyroid was not enlarged [No Thyroid Nodules] : no palpable thyroid nodules [No Respiratory Distress] : no respiratory distress [Regular Rhythm] : with a regular rhythm [No Stigmata of Cushings Syndrome] : no stigmata of Cushings Syndrome [No Rash] : no rash [No Tremors] : no tremors [Oriented x3] : oriented to person, place, and time [Normal Insight/Judgement] : insight and judgment were intact [Kyphosis] : no kyphosis present

## 2021-03-16 NOTE — REVIEW OF SYSTEMS
[Fatigue] : fatigue [Negative] : Heme/Lymph [Palpitations] : no palpitations [Fast Heart Rate] : heart rate is not fast

## 2021-03-16 NOTE — HISTORY OF PRESENT ILLNESS
[FreeTextEntry1] : 45 yoF here for f/u of Hypothyroidism. She is currently 20wks gestation.\par She is compliant with Levothyroxine 25 mcg qd

## 2021-03-16 NOTE — ASSESSMENT
[Levothyroxine] : The patient was instructed to take Levothyroxine on an empty stomach, separate from vitamins, and wait at least 30 minutes before eating [FreeTextEntry1] : 1- cont  25 mcg of levothyroxin\par TFT done today, and will adjust dose if necessary\par Ret in 2 mo

## 2021-03-17 LAB
T3FREE SERPL-MCNC: 2.56 PG/ML
T4 FREE SERPL-MCNC: 0.9 NG/DL
TSH SERPL-ACNC: 2.68 UIU/ML

## 2021-03-22 ENCOUNTER — NON-APPOINTMENT (OUTPATIENT)
Age: 46
End: 2021-03-22

## 2021-03-23 ENCOUNTER — ASOB RESULT (OUTPATIENT)
Age: 46
End: 2021-03-23

## 2021-03-23 ENCOUNTER — APPOINTMENT (OUTPATIENT)
Dept: ANTEPARTUM | Facility: CLINIC | Age: 46
End: 2021-03-23
Payer: COMMERCIAL

## 2021-03-23 PROCEDURE — 99072 ADDL SUPL MATRL&STAF TM PHE: CPT

## 2021-03-23 PROCEDURE — 76811 OB US DETAILED SNGL FETUS: CPT

## 2021-03-24 ENCOUNTER — NON-APPOINTMENT (OUTPATIENT)
Age: 46
End: 2021-03-24

## 2021-03-25 ENCOUNTER — NON-APPOINTMENT (OUTPATIENT)
Age: 46
End: 2021-03-25

## 2021-03-25 ENCOUNTER — LABORATORY RESULT (OUTPATIENT)
Age: 46
End: 2021-03-25

## 2021-03-25 ENCOUNTER — APPOINTMENT (OUTPATIENT)
Dept: ANTEPARTUM | Facility: CLINIC | Age: 46
End: 2021-03-25

## 2021-03-25 ENCOUNTER — APPOINTMENT (OUTPATIENT)
Dept: MATERNAL FETAL MEDICINE | Facility: CLINIC | Age: 46
End: 2021-03-25
Payer: COMMERCIAL

## 2021-03-25 VITALS — HEIGHT: 62 IN | BODY MASS INDEX: 42.62 KG/M2

## 2021-03-25 VITALS — SYSTOLIC BLOOD PRESSURE: 90 MMHG | BODY MASS INDEX: 31.6 KG/M2 | DIASTOLIC BLOOD PRESSURE: 60 MMHG | WEIGHT: 233 LBS

## 2021-03-25 PROCEDURE — 99072 ADDL SUPL MATRL&STAF TM PHE: CPT

## 2021-03-25 PROCEDURE — 99204 OFFICE O/P NEW MOD 45 MIN: CPT

## 2021-03-25 RX ORDER — INDOMETHACIN 50 MG/1
50 CAPSULE ORAL
Qty: 6 | Refills: 0 | Status: DISCONTINUED | COMMUNITY
Start: 2020-11-08 | End: 2021-03-25

## 2021-03-25 RX ORDER — IBUPROFEN 600 MG/1
600 TABLET, FILM COATED ORAL
Qty: 28 | Refills: 0 | Status: DISCONTINUED | COMMUNITY
Start: 2020-09-16 | End: 2021-03-25

## 2021-03-25 RX ORDER — ASCORBIC ACID, CHOLECALCIFEROL, .ALPHA.-TOCOPHEROL ACETATE, DL-, PYRIDOXINE, FOLIC ACID, CYANOCOBALAMIN, CALCIUM, FERROUS FUMARATE, MAGNESIUM, DOCONEXENT 85; 200; 10; 25; 1; 12; 140; 27; 45; 300 [IU]/1; [IU]/1; [IU]/1; [IU]/1; MG/1; UG/1; MG/1; MG/1; MG/1; MG/1
27-0.6-0.4-3 CAPSULE, GELATIN COATED ORAL
Qty: 90 | Refills: 3 | Status: DISCONTINUED | COMMUNITY
Start: 2021-03-22 | End: 2021-03-25

## 2021-03-25 RX ORDER — CYCLOBENZAPRINE HYDROCHLORIDE 10 MG/1
10 TABLET, FILM COATED ORAL
Qty: 21 | Refills: 0 | Status: DISCONTINUED | COMMUNITY
Start: 2020-09-16 | End: 2021-03-25

## 2021-03-25 NOTE — DISCUSSION/SUMMARY
[FreeTextEntry1] : The patient is a 45-year-old G5 para 2-0-2-2 being seen today at 21 weeks for advanced maternal age, maternal obesity, previous  section, Hypothyroidism and elevated hemoglobin A1c.\par \par Her obstetrical history is significant for delivery in  of a liveborn female  weighing 2 pounds 10 ounces delivered via normal spontaneous vaginal delivery at 28 weeks.  The patient states she had premature rupture of membrane with spontaneous onset of labor.  Subsequent pregnancy in  a liveborn female  weighing 5 pounds 5 ounces delivered at term via  section for failure to dilate and nonreassuring fetal heart rate tracing.  No problems or complications were noted with that pregnancy.  She has also had 2 first trimester spontaneous miscarriages which did not require surgical intervention.\par \par A comprehensive ultrasound was performed on  and revealed a single viable intrauterine gestation with size consistent with dates.  No gross or soft markers associated with fetal aneuploidy were noted.  Marginal umbilical cord insertion noted. Vital signs today reveal a blood pressure of 90/60, maternal weight is 233 pounds consistent with a BMI of 42.62 kg.\par \par Advanced maternal age;\par \par The father of this pregnancy is a different father from the previous pregnancies.  The patient is at greater risk for chromosomal abnormalities.  Ultra-Screen evaluation and second trimester genetic testing have been performed and demonstrate low risk for fetal aneuploidy.  Patient states that a NIPS test was performed and was normal.  I am not able to visualize those results in the patient's medical records.  At this time diagnostic testing is not recommended.  The patient is at greater risk for hypertensive disease associated with her age and a new partner.  She is on low-dose aspirin.  She is taking 1 tablet on odd days and 2 tablets on even days and she should continue this medication until 37 weeks.  She understands that she may need medical intervention for her blood pressure.  Preeclampsia was discussed with the possibility of  delivery with its associated morbidity and mortality.\par \par Diabetes;\par \par The patient had hemoglobin A1c drawn in December which was elevated at 6.2%.  Her father has diabetes as well.  The patient will be referred for dietary consultation and will begin home glucose monitoring.  3 hour GTT NOT recommended.  Medical intervention will be initiated if indicated.  She will also be sent for a fetal echo.  A repeat hemoglobin A1c was drawn in our office today.  A growth scan and follow-up maternal-fetal medicine consultation in 1 month is recommended.  Problems and complications related to diabetes in pregnancy were discussed.  These included but not limited to fetal macrosomia, increased risk for operative vaginal delivery and  section, shoulder dystocia with associated Erb's palsy and possible neurological damage, stillbirth and increased risk for  intensive care unit admission.  The patient states that she will have a repeat  section performed and this should be performed at 39 weeks or sooner if clinically indicated.\par \par The patient has hypothyroidism and is on 50 mcg of Synthroid daily.  Serial thyroid function test evaluation done in 6 to 8 weeks with adjustment medication is recommended.\par \par COVID-19 vaccination;\par \par COVID-19 vaccination was discussed.  The patient understands that we advise pregnant women to be vaccinated while pregnant.  There is a subset of pregnant patients that are at greater risk for problems and complications secondary to Covid infection during pregnancy.  The patient will discuss obtaining the vaccination with you at her next visit.\par \par Her father has diabetes.  Her previous surgical history is significant for  section.  She has no known allergies to medications and denies alcohol, tobacco or drug use.\par \par I spent a total of 47 minutes face-to-face evaluating the patient's prenatal records, previous obstetrical history, prenatal blood work, consultations and previous ultrasounds of which greater than 50% was counseling and coordinating care.\par \par Recommendations;\par \par 1.  Dietary consultation is recommended.\par 2.  Continue low-dose aspirin 1 tab days, 2 tabs even days until 37 weeks.\par 3.  Hemoglobin A1c repeated today.\par 4.  Growth scan with follow-up maternal-fetal medicine consultation 1 month is recommended.

## 2021-03-26 ENCOUNTER — NON-APPOINTMENT (OUTPATIENT)
Age: 46
End: 2021-03-26

## 2021-03-29 ENCOUNTER — NON-APPOINTMENT (OUTPATIENT)
Age: 46
End: 2021-03-29

## 2021-03-29 ENCOUNTER — APPOINTMENT (OUTPATIENT)
Dept: OBGYN | Facility: CLINIC | Age: 46
End: 2021-03-29
Payer: COMMERCIAL

## 2021-03-29 PROCEDURE — 0502F SUBSEQUENT PRENATAL CARE: CPT

## 2021-04-06 ENCOUNTER — APPOINTMENT (OUTPATIENT)
Dept: PEDIATRIC CARDIOLOGY | Facility: CLINIC | Age: 46
End: 2021-04-06
Payer: COMMERCIAL

## 2021-04-06 PROCEDURE — 76825 ECHO EXAM OF FETAL HEART: CPT

## 2021-04-06 PROCEDURE — 93325 DOPPLER ECHO COLOR FLOW MAPG: CPT

## 2021-04-06 PROCEDURE — 99241 OFFICE CONSULTATION NEW/ESTAB PATIENT 15 MIN: CPT

## 2021-04-06 PROCEDURE — 76827 ECHO EXAM OF FETAL HEART: CPT

## 2021-04-06 PROCEDURE — 99072 ADDL SUPL MATRL&STAF TM PHE: CPT

## 2021-04-08 ENCOUNTER — APPOINTMENT (OUTPATIENT)
Dept: MATERNAL FETAL MEDICINE | Facility: CLINIC | Age: 46
End: 2021-04-08
Payer: COMMERCIAL

## 2021-04-08 ENCOUNTER — ASOB RESULT (OUTPATIENT)
Age: 46
End: 2021-04-08

## 2021-04-08 LAB
CLARIM 15Q11.2: NORMAL
CLARIM 1P36: NORMAL
CLARIM 22Q11.2: NORMAL
CLARIM 4P-/WOLF-HIRSCHHORN: NORMAL
CLARIM 5P-/CRI DU CHAT: NORMAL
CLARIM ADDITIONAL INFO: NORMAL
CLARIM CHROMOSOME 13: NORMAL
CLARIM CHROMOSOME 18: NORMAL
CLARIM CHROMOSOME 21: NORMAL
CLARIM SEX CHROMOSOMES: NORMAL
CLARITEST NIPT W/MICRO: NORMAL

## 2021-04-08 PROCEDURE — G0108 DIAB MANAGE TRN  PER INDIV: CPT | Mod: 95

## 2021-04-22 ENCOUNTER — ASOB RESULT (OUTPATIENT)
Age: 46
End: 2021-04-22

## 2021-04-22 ENCOUNTER — APPOINTMENT (OUTPATIENT)
Dept: MATERNAL FETAL MEDICINE | Facility: CLINIC | Age: 46
End: 2021-04-22
Payer: COMMERCIAL

## 2021-04-22 ENCOUNTER — APPOINTMENT (OUTPATIENT)
Dept: ANTEPARTUM | Facility: CLINIC | Age: 46
End: 2021-04-22
Payer: COMMERCIAL

## 2021-04-22 ENCOUNTER — APPOINTMENT (OUTPATIENT)
Dept: ANTEPARTUM | Facility: CLINIC | Age: 46
End: 2021-04-22

## 2021-04-22 VITALS
BODY MASS INDEX: 42.43 KG/M2 | DIASTOLIC BLOOD PRESSURE: 74 MMHG | SYSTOLIC BLOOD PRESSURE: 116 MMHG | WEIGHT: 232 LBS | HEART RATE: 88 BPM

## 2021-04-22 PROCEDURE — 76816 OB US FOLLOW-UP PER FETUS: CPT

## 2021-04-22 PROCEDURE — 99072 ADDL SUPL MATRL&STAF TM PHE: CPT

## 2021-04-22 PROCEDURE — 99214 OFFICE O/P EST MOD 30 MIN: CPT

## 2021-04-22 NOTE — DISCUSSION/SUMMARY
[FreeTextEntry1] : Please see the previous consultation for the patient's medical and obstetrical history.  The patient is being seen today at 24 weeks for type 2 diabetes, advanced maternal age, maternal obesity, hypothyroidism, Low HIRA-A (5th %) and previous  section.\par \par Evaluation of the patient's diabetic flowsheets is limited.  I have 4 fasting blood sugars over the past 2 weeks and all are elevated.  There are 8 postprandial blood sugars and these are all found to be within normal limits.  A growth scan was performed today and reveals a single viable intrauterine gestation with the estimated fetal weight of 1 pound 9 ounce consistent with the 11 percentile.  Breech presentation with an anterior placenta seen and the amniotic fluid index is normal at 17.8 cm.  Normal umbilical artery Doppler flow study.  Vital signs today reveal a blood pressure of 116/74, maternal weight is 232 pounds consistent with a BMI of 42.43 kg.\par \par Type 2 diabetes;\par \par The patient had a hemoglobin A1c performed after her last consultation and it was elevated at 6.4%.  Dietary consultation has been performed and was sent under separate cover.  The patient's fasting blood sugars are all elevated and will probably need medical intervention.  She will test her blood sugars for the next week and then send them to us for evaluation.  Should her fasting blood sugars continue to be elevated medical intervention will be started.  Options discussed included insulin and metformin therapy.  The risks and benefits of each of these medications were discussed and the patient will think about this and should medical intervention be needed she will tell us which for medication she will use.  A fetal echo was performed on  and was normal.  Follow-up as clinically indicated.  Problems and complications related to diabetic pregnancy including fetal macrosomia, increased risk for operative vaginal delivery and  section, shoulder dystocia with associated morbidity mortality, stillbirth and increased risk for  intensive care unit admission were discussed.  Repeat hemoglobin A1c in  is recommended.  The patient finally understands the importance of good dietary intake and appropriate home glucose monitoring and states she will be compliant going forward.  Follow-up dietary consultation is scheduled in 2 weeks.  Follow-up growth scan with maternal-fetal medicine consultation in 1 month is recommended.\par \par Low HIRA-A;\par \par The patient has had Ultra-Screen, sequential blood work and noninterventional prenatal screening performed.  All these results revealed low risk for fetal aneuploidy.  The HIRA-A was at the 5th percentile.  Patient started low-dose aspirin 1 tablet on odd days and 2 tablets on even days which she will take until 37 weeks.\par \par The patient's weight was discussed.  She has lost 1 pound since her previous evaluation on .  She was reassured that this is a normal occurrence in patients who go on diabetic diets due to changes in the percentages of carbohydrates, protein and fats.  Appropriate weight gain during this pregnancy was discussed.  All of the above was discussed with the patient all of her questions were answered.\par \par I spent a total of 37 minutes evaluating the patient's prenatal record, prenatal blood work, fetal echo, previous consultation and ultrasound reports counseling and coordinating care.\par \par Recommendations;\par \par 1.  Continue current ADA diet and home glucose monitoring.\par 2.  Patient will email her diabetic flowsheet to us in 1 week and if fasting blood sugars continue to be above 90 medical intervention will be started.\par 3.  Dietary consultation is scheduled.\par 4.  Growth scan and maternal-fetal medicine consultation in 1 month is recommended.\par 5.  Continue low-dose aspirin 1 tablet on odd days and 2 tablet on even days.

## 2021-04-23 ENCOUNTER — NON-APPOINTMENT (OUTPATIENT)
Age: 46
End: 2021-04-23

## 2021-04-26 ENCOUNTER — NON-APPOINTMENT (OUTPATIENT)
Age: 46
End: 2021-04-26

## 2021-04-26 ENCOUNTER — APPOINTMENT (OUTPATIENT)
Dept: OBGYN | Facility: CLINIC | Age: 46
End: 2021-04-26
Payer: COMMERCIAL

## 2021-04-26 VITALS
SYSTOLIC BLOOD PRESSURE: 120 MMHG | DIASTOLIC BLOOD PRESSURE: 60 MMHG | HEIGHT: 62 IN | BODY MASS INDEX: 43.24 KG/M2 | WEIGHT: 235 LBS

## 2021-04-26 PROCEDURE — 0502F SUBSEQUENT PRENATAL CARE: CPT

## 2021-05-06 ENCOUNTER — APPOINTMENT (OUTPATIENT)
Dept: MATERNAL FETAL MEDICINE | Facility: CLINIC | Age: 46
End: 2021-05-06

## 2021-05-13 ENCOUNTER — APPOINTMENT (OUTPATIENT)
Dept: OBGYN | Facility: CLINIC | Age: 46
End: 2021-05-13
Payer: COMMERCIAL

## 2021-05-13 ENCOUNTER — NON-APPOINTMENT (OUTPATIENT)
Age: 46
End: 2021-05-13

## 2021-05-13 VITALS
HEART RATE: 72 BPM | HEIGHT: 62 IN | OXYGEN SATURATION: 99 % | BODY MASS INDEX: 43.43 KG/M2 | WEIGHT: 236 LBS | SYSTOLIC BLOOD PRESSURE: 110 MMHG | DIASTOLIC BLOOD PRESSURE: 70 MMHG | RESPIRATION RATE: 16 BRPM

## 2021-05-13 LAB
BILIRUB UR QL STRIP: NEGATIVE
CLARITY UR: CLEAR
COLLECTION METHOD: NORMAL
GLUCOSE UR-MCNC: NEGATIVE
HCG UR QL: 0.2 EU/DL
HGB UR QL STRIP.AUTO: NEGATIVE
KETONES UR-MCNC: NEGATIVE
LEUKOCYTE ESTERASE UR QL STRIP: NEGATIVE
NITRITE UR QL STRIP: NEGATIVE
PH UR STRIP: 6
PROT UR STRIP-MCNC: NEGATIVE
SP GR UR STRIP: >1.03

## 2021-05-13 PROCEDURE — 81003 URINALYSIS AUTO W/O SCOPE: CPT | Mod: QW

## 2021-05-13 PROCEDURE — 36415 COLL VENOUS BLD VENIPUNCTURE: CPT

## 2021-05-13 PROCEDURE — 0502F SUBSEQUENT PRENATAL CARE: CPT

## 2021-05-14 LAB
BASOPHILS # BLD AUTO: 0.02 K/UL
BASOPHILS NFR BLD AUTO: 0.2 %
COVID-19 NUCLEOCAPSID  GAM ANTIBODY INTERPRETATION: NEGATIVE
EOSINOPHIL # BLD AUTO: 0.09 K/UL
EOSINOPHIL NFR BLD AUTO: 1.1 %
GLUCOSE 1H P 50 G GLC PO SERPL-MCNC: 125 MG/DL
HCT VFR BLD CALC: 34.4 %
HGB BLD-MCNC: 11.2 G/DL
IMM GRANULOCYTES NFR BLD AUTO: 0.4 %
LYMPHOCYTES # BLD AUTO: 1.62 K/UL
LYMPHOCYTES NFR BLD AUTO: 19.7 %
MAN DIFF?: NORMAL
MCHC RBC-ENTMCNC: 29.9 PG
MCHC RBC-ENTMCNC: 32.6 GM/DL
MCV RBC AUTO: 92 FL
MONOCYTES # BLD AUTO: 0.67 K/UL
MONOCYTES NFR BLD AUTO: 8.1 %
NEUTROPHILS # BLD AUTO: 5.81 K/UL
NEUTROPHILS NFR BLD AUTO: 70.5 %
PLATELET # BLD AUTO: 277 K/UL
RBC # BLD: 3.74 M/UL
RBC # FLD: 15 %
SARS-COV-2 AB SERPL QL IA: 0.09 INDEX
WBC # FLD AUTO: 8.24 K/UL

## 2021-05-18 ENCOUNTER — APPOINTMENT (OUTPATIENT)
Dept: ENDOCRINOLOGY | Facility: CLINIC | Age: 46
End: 2021-05-18

## 2021-05-18 ENCOUNTER — OUTPATIENT (OUTPATIENT)
Dept: INPATIENT UNIT | Facility: HOSPITAL | Age: 46
LOS: 1 days | Discharge: HOME | End: 2021-05-18
Payer: COMMERCIAL

## 2021-05-18 DIAGNOSIS — O34.219 MATERNAL CARE FOR UNSPECIFIED TYPE SCAR FROM PREVIOUS CESAREAN DELIVERY: Chronic | ICD-10-CM

## 2021-05-18 DIAGNOSIS — O26.899 OTHER SPECIFIED PREGNANCY RELATED CONDITIONS, UNSPECIFIED TRIMESTER: ICD-10-CM

## 2021-05-18 LAB
APPEARANCE UR: ABNORMAL
BILIRUB UR-MCNC: NEGATIVE — SIGNIFICANT CHANGE UP
COLOR SPEC: YELLOW — SIGNIFICANT CHANGE UP
DIFF PNL FLD: ABNORMAL
GLUCOSE UR QL: NEGATIVE MG/DL — SIGNIFICANT CHANGE UP
KETONES UR-MCNC: ABNORMAL
LEUKOCYTE ESTERASE UR-ACNC: NEGATIVE — SIGNIFICANT CHANGE UP
NITRITE UR-MCNC: NEGATIVE — SIGNIFICANT CHANGE UP
PH UR: 6 — SIGNIFICANT CHANGE UP (ref 5–8)
PROT UR-MCNC: NEGATIVE MG/DL — SIGNIFICANT CHANGE UP
SP GR SPEC: 1.01 — SIGNIFICANT CHANGE UP (ref 1.01–1.02)
UROBILINOGEN FLD QL: NEGATIVE MG/DL — SIGNIFICANT CHANGE UP

## 2021-05-18 PROCEDURE — 76819 FETAL BIOPHYS PROFIL W/O NST: CPT

## 2021-05-18 PROCEDURE — 96360 HYDRATION IV INFUSION INIT: CPT

## 2021-05-18 PROCEDURE — 76805 OB US >/= 14 WKS SNGL FETUS: CPT | Mod: 26

## 2021-05-18 PROCEDURE — 76817 TRANSVAGINAL US OBSTETRIC: CPT

## 2021-05-18 PROCEDURE — 59025 FETAL NON-STRESS TEST: CPT

## 2021-05-18 PROCEDURE — 99214 OFFICE O/P EST MOD 30 MIN: CPT

## 2021-05-18 PROCEDURE — 96361 HYDRATE IV INFUSION ADD-ON: CPT

## 2021-05-18 PROCEDURE — 76805 OB US >/= 14 WKS SNGL FETUS: CPT

## 2021-05-18 PROCEDURE — 76819 FETAL BIOPHYS PROFIL W/O NST: CPT | Mod: 26

## 2021-05-18 PROCEDURE — 81001 URINALYSIS AUTO W/SCOPE: CPT

## 2021-05-18 PROCEDURE — G0463: CPT

## 2021-05-18 PROCEDURE — 76817 TRANSVAGINAL US OBSTETRIC: CPT | Mod: 26

## 2021-05-18 PROCEDURE — 82962 GLUCOSE BLOOD TEST: CPT

## 2021-05-18 RX ORDER — SODIUM CHLORIDE 9 MG/ML
1000 INJECTION, SOLUTION INTRAVENOUS ONCE
Refills: 0 | Status: COMPLETED | OUTPATIENT
Start: 2021-05-18 | End: 2021-05-18

## 2021-05-18 RX ORDER — SODIUM CHLORIDE 9 MG/ML
500 INJECTION, SOLUTION INTRAVENOUS ONCE
Refills: 0 | Status: COMPLETED | OUTPATIENT
Start: 2021-05-18 | End: 2021-05-18

## 2021-05-18 RX ADMIN — SODIUM CHLORIDE 1000 MILLILITER(S): 9 INJECTION, SOLUTION INTRAVENOUS at 02:45

## 2021-05-18 RX ADMIN — SODIUM CHLORIDE 1000 MILLILITER(S): 9 INJECTION, SOLUTION INTRAVENOUS at 04:40

## 2021-05-19 DIAGNOSIS — O47.9 FALSE LABOR, UNSPECIFIED: ICD-10-CM

## 2021-05-20 ENCOUNTER — APPOINTMENT (OUTPATIENT)
Dept: ANTEPARTUM | Facility: CLINIC | Age: 46
End: 2021-05-20
Payer: COMMERCIAL

## 2021-05-20 ENCOUNTER — ASOB RESULT (OUTPATIENT)
Age: 46
End: 2021-05-20

## 2021-05-20 PROCEDURE — 99072 ADDL SUPL MATRL&STAF TM PHE: CPT

## 2021-05-20 PROCEDURE — 76816 OB US FOLLOW-UP PER FETUS: CPT

## 2021-05-25 ENCOUNTER — NON-APPOINTMENT (OUTPATIENT)
Age: 46
End: 2021-05-25

## 2021-05-26 ENCOUNTER — NON-APPOINTMENT (OUTPATIENT)
Age: 46
End: 2021-05-26

## 2021-05-26 ENCOUNTER — APPOINTMENT (OUTPATIENT)
Dept: OBGYN | Facility: CLINIC | Age: 46
End: 2021-05-26
Payer: COMMERCIAL

## 2021-05-26 VITALS
DIASTOLIC BLOOD PRESSURE: 60 MMHG | SYSTOLIC BLOOD PRESSURE: 106 MMHG | HEIGHT: 62 IN | BODY MASS INDEX: 43.43 KG/M2 | WEIGHT: 236 LBS

## 2021-05-26 LAB
BILIRUB UR QL STRIP: NORMAL
GLUCOSE UR-MCNC: NORMAL
HCG UR QL: 0.2 EU/DL
HGB UR QL STRIP.AUTO: NORMAL
KETONES UR-MCNC: NORMAL
LEUKOCYTE ESTERASE UR QL STRIP: NORMAL
NITRITE UR QL STRIP: NORMAL
PH UR STRIP: 7
PROT UR STRIP-MCNC: NORMAL
SP GR UR STRIP: 1.01

## 2021-05-26 PROCEDURE — 0502F SUBSEQUENT PRENATAL CARE: CPT

## 2021-05-26 PROCEDURE — 81003 URINALYSIS AUTO W/O SCOPE: CPT | Mod: QW

## 2021-05-28 ENCOUNTER — APPOINTMENT (OUTPATIENT)
Dept: ANTEPARTUM | Facility: CLINIC | Age: 46
End: 2021-05-28

## 2021-05-28 ENCOUNTER — APPOINTMENT (OUTPATIENT)
Dept: ANTEPARTUM | Facility: CLINIC | Age: 46
End: 2021-05-28
Payer: COMMERCIAL

## 2021-05-28 ENCOUNTER — APPOINTMENT (OUTPATIENT)
Dept: MATERNAL FETAL MEDICINE | Facility: CLINIC | Age: 46
End: 2021-05-28
Payer: COMMERCIAL

## 2021-05-28 ENCOUNTER — ASOB RESULT (OUTPATIENT)
Age: 46
End: 2021-05-28

## 2021-05-28 VITALS
HEART RATE: 90 BPM | SYSTOLIC BLOOD PRESSURE: 106 MMHG | DIASTOLIC BLOOD PRESSURE: 68 MMHG | BODY MASS INDEX: 42.8 KG/M2 | WEIGHT: 234 LBS

## 2021-05-28 PROCEDURE — 76820 UMBILICAL ARTERY ECHO: CPT

## 2021-05-28 PROCEDURE — 99072 ADDL SUPL MATRL&STAF TM PHE: CPT

## 2021-05-28 PROCEDURE — 99214 OFFICE O/P EST MOD 30 MIN: CPT

## 2021-05-28 PROCEDURE — 76819 FETAL BIOPHYS PROFIL W/O NST: CPT

## 2021-05-28 NOTE — DATA REVIEWED
[FreeTextEntry1] : Todays sonogram shows reassuring fetal status, with normal fluid.  She will return next week for interval growth and repeat Doppler.\par \par I discussed the findings of SGA with Edita, especially in light of her type 2 Diabetes and low HIRA A, and stressed the importance of monitoring interval growth and the reassurances of fetal testing and DOppler in consideration of these risk factors. She has a hisotry of small babies delivered at term that were healthy, and this may be following a similar pattern\par \par Her glucose records were reviewed and shows overall good control on diet alone. Her fasting and post prandial values are acceptable.

## 2021-05-28 NOTE — DISCUSSION/SUMMARY
[FreeTextEntry1] : Weekly fetal testing. \par \par Repeat growth and Doppler is scheduled for next week.\par \par Continue testing glucose 4X daily

## 2021-05-28 NOTE — HISTORY OF PRESENT ILLNESS
[FreeTextEntry1] : Edita presents for evaluation of glucose control and weekly testing. She was previously diagnosed with low HIRA A, and at her most recent growth sonogram on 5/20 was noted to have an SGA fetus at the 9th percentile, with normal umbilical DOppler

## 2021-06-04 ENCOUNTER — APPOINTMENT (OUTPATIENT)
Dept: ANTEPARTUM | Facility: CLINIC | Age: 46
End: 2021-06-04
Payer: COMMERCIAL

## 2021-06-04 ENCOUNTER — ASOB RESULT (OUTPATIENT)
Age: 46
End: 2021-06-04

## 2021-06-04 PROCEDURE — 76816 OB US FOLLOW-UP PER FETUS: CPT

## 2021-06-04 PROCEDURE — 76820 UMBILICAL ARTERY ECHO: CPT

## 2021-06-04 PROCEDURE — 99072 ADDL SUPL MATRL&STAF TM PHE: CPT

## 2021-06-04 PROCEDURE — 93976 VASCULAR STUDY: CPT

## 2021-06-08 ENCOUNTER — NON-APPOINTMENT (OUTPATIENT)
Age: 46
End: 2021-06-08

## 2021-06-09 ENCOUNTER — NON-APPOINTMENT (OUTPATIENT)
Age: 46
End: 2021-06-09

## 2021-06-09 ENCOUNTER — APPOINTMENT (OUTPATIENT)
Dept: OBGYN | Facility: CLINIC | Age: 46
End: 2021-06-09
Payer: COMMERCIAL

## 2021-06-09 VITALS
WEIGHT: 235 LBS | HEIGHT: 62 IN | SYSTOLIC BLOOD PRESSURE: 110 MMHG | BODY MASS INDEX: 43.24 KG/M2 | DIASTOLIC BLOOD PRESSURE: 69 MMHG

## 2021-06-09 LAB
BILIRUB UR QL STRIP: NORMAL
GLUCOSE UR-MCNC: NORMAL
HCG UR QL: 1 EU/DL
HGB UR QL STRIP.AUTO: NORMAL
KETONES UR-MCNC: NORMAL
LEUKOCYTE ESTERASE UR QL STRIP: NORMAL
NITRITE UR QL STRIP: NORMAL
PH UR STRIP: 6
PROT UR STRIP-MCNC: NORMAL
SP GR UR STRIP: 1.03

## 2021-06-09 PROCEDURE — 81003 URINALYSIS AUTO W/O SCOPE: CPT | Mod: QW

## 2021-06-09 PROCEDURE — 0502F SUBSEQUENT PRENATAL CARE: CPT

## 2021-06-10 ENCOUNTER — ASOB RESULT (OUTPATIENT)
Age: 46
End: 2021-06-10

## 2021-06-10 ENCOUNTER — APPOINTMENT (OUTPATIENT)
Dept: ANTEPARTUM | Facility: CLINIC | Age: 46
End: 2021-06-10
Payer: COMMERCIAL

## 2021-06-10 PROCEDURE — 76820 UMBILICAL ARTERY ECHO: CPT

## 2021-06-10 PROCEDURE — 99072 ADDL SUPL MATRL&STAF TM PHE: CPT

## 2021-06-10 PROCEDURE — 76819 FETAL BIOPHYS PROFIL W/O NST: CPT

## 2021-06-15 ENCOUNTER — NON-APPOINTMENT (OUTPATIENT)
Age: 46
End: 2021-06-15

## 2021-06-16 ENCOUNTER — NON-APPOINTMENT (OUTPATIENT)
Age: 46
End: 2021-06-16

## 2021-06-16 ENCOUNTER — APPOINTMENT (OUTPATIENT)
Dept: OBGYN | Facility: CLINIC | Age: 46
End: 2021-06-16
Payer: COMMERCIAL

## 2021-06-16 VITALS
DIASTOLIC BLOOD PRESSURE: 60 MMHG | HEIGHT: 62 IN | SYSTOLIC BLOOD PRESSURE: 120 MMHG | BODY MASS INDEX: 43.43 KG/M2 | WEIGHT: 236 LBS

## 2021-06-16 PROCEDURE — 90715 TDAP VACCINE 7 YRS/> IM: CPT

## 2021-06-16 PROCEDURE — 81003 URINALYSIS AUTO W/O SCOPE: CPT | Mod: QW

## 2021-06-16 PROCEDURE — 90471 IMMUNIZATION ADMIN: CPT

## 2021-06-16 PROCEDURE — 0502F SUBSEQUENT PRENATAL CARE: CPT

## 2021-06-17 ENCOUNTER — APPOINTMENT (OUTPATIENT)
Dept: ANTEPARTUM | Facility: CLINIC | Age: 46
End: 2021-06-17
Payer: COMMERCIAL

## 2021-06-17 ENCOUNTER — ASOB RESULT (OUTPATIENT)
Age: 46
End: 2021-06-17

## 2021-06-17 PROCEDURE — 76820 UMBILICAL ARTERY ECHO: CPT

## 2021-06-17 PROCEDURE — 76816 OB US FOLLOW-UP PER FETUS: CPT

## 2021-06-17 PROCEDURE — 99072 ADDL SUPL MATRL&STAF TM PHE: CPT

## 2021-06-17 PROCEDURE — 76819 FETAL BIOPHYS PROFIL W/O NST: CPT

## 2021-06-24 ENCOUNTER — NON-APPOINTMENT (OUTPATIENT)
Age: 46
End: 2021-06-24

## 2021-06-24 ENCOUNTER — APPOINTMENT (OUTPATIENT)
Dept: ANTEPARTUM | Facility: CLINIC | Age: 46
End: 2021-06-24
Payer: COMMERCIAL

## 2021-06-24 ENCOUNTER — ASOB RESULT (OUTPATIENT)
Age: 46
End: 2021-06-24

## 2021-06-24 ENCOUNTER — APPOINTMENT (OUTPATIENT)
Dept: OBGYN | Facility: CLINIC | Age: 46
End: 2021-06-24
Payer: COMMERCIAL

## 2021-06-24 VITALS
SYSTOLIC BLOOD PRESSURE: 100 MMHG | BODY MASS INDEX: 43.43 KG/M2 | DIASTOLIC BLOOD PRESSURE: 60 MMHG | WEIGHT: 236 LBS | HEIGHT: 62 IN

## 2021-06-24 LAB
BILIRUB UR QL STRIP: NORMAL
GLUCOSE UR-MCNC: NORMAL
HCG UR QL: 1 EU/DL
HGB UR QL STRIP.AUTO: NORMAL
KETONES UR-MCNC: NORMAL
NITRITE UR QL STRIP: NORMAL
PH UR STRIP: 6.5
PROT UR STRIP-MCNC: NORMAL
SP GR UR STRIP: 1.01

## 2021-06-24 PROCEDURE — 76819 FETAL BIOPHYS PROFIL W/O NST: CPT

## 2021-06-24 PROCEDURE — 0502F SUBSEQUENT PRENATAL CARE: CPT

## 2021-06-24 PROCEDURE — 99072 ADDL SUPL MATRL&STAF TM PHE: CPT

## 2021-06-30 ENCOUNTER — NON-APPOINTMENT (OUTPATIENT)
Age: 46
End: 2021-06-30

## 2021-07-01 ENCOUNTER — APPOINTMENT (OUTPATIENT)
Dept: ANTEPARTUM | Facility: CLINIC | Age: 46
End: 2021-07-01

## 2021-07-02 ENCOUNTER — APPOINTMENT (OUTPATIENT)
Dept: OBGYN | Facility: CLINIC | Age: 46
End: 2021-07-02
Payer: COMMERCIAL

## 2021-07-02 VITALS
WEIGHT: 238 LBS | DIASTOLIC BLOOD PRESSURE: 64 MMHG | TEMPERATURE: 98.6 F | HEIGHT: 62 IN | HEART RATE: 70 BPM | SYSTOLIC BLOOD PRESSURE: 106 MMHG | RESPIRATION RATE: 16 BRPM | BODY MASS INDEX: 43.79 KG/M2

## 2021-07-02 PROCEDURE — 0502F SUBSEQUENT PRENATAL CARE: CPT | Mod: 25

## 2021-07-02 PROCEDURE — 59025 FETAL NON-STRESS TEST: CPT

## 2021-07-03 ENCOUNTER — NON-APPOINTMENT (OUTPATIENT)
Age: 46
End: 2021-07-03

## 2021-07-03 LAB
BASOPHILS # BLD AUTO: 0.02 K/UL
BASOPHILS NFR BLD AUTO: 0.2 %
EOSINOPHIL # BLD AUTO: 0.06 K/UL
EOSINOPHIL NFR BLD AUTO: 0.7 %
HCT VFR BLD CALC: 33 %
HGB BLD-MCNC: 10.2 G/DL
HIV1+2 AB SPEC QL IA.RAPID: NONREACTIVE
IMM GRANULOCYTES NFR BLD AUTO: 0.4 %
LYMPHOCYTES # BLD AUTO: 1.45 K/UL
LYMPHOCYTES NFR BLD AUTO: 18.1 %
MAN DIFF?: NORMAL
MCHC RBC-ENTMCNC: 28.1 PG
MCHC RBC-ENTMCNC: 30.9 GM/DL
MCV RBC AUTO: 90.9 FL
MONOCYTES # BLD AUTO: 0.89 K/UL
MONOCYTES NFR BLD AUTO: 11.1 %
NEUTROPHILS # BLD AUTO: 5.58 K/UL
NEUTROPHILS NFR BLD AUTO: 69.5 %
PLATELET # BLD AUTO: 319 K/UL
RBC # BLD: 3.63 M/UL
RBC # FLD: 13.9 %
T3 SERPL-MCNC: 184 NG/DL
T4 SERPL-MCNC: 10 UG/DL
TSH SERPL-ACNC: 3.52 UIU/ML
WBC # FLD AUTO: 8.03 K/UL

## 2021-07-05 LAB
GP B STREP DNA SPEC QL NAA+PROBE: NORMAL
GP B STREP DNA SPEC QL NAA+PROBE: NOT DETECTED
SOURCE GBS: NORMAL

## 2021-07-06 ENCOUNTER — NON-APPOINTMENT (OUTPATIENT)
Age: 46
End: 2021-07-06

## 2021-07-07 ENCOUNTER — NON-APPOINTMENT (OUTPATIENT)
Age: 46
End: 2021-07-07

## 2021-07-07 ENCOUNTER — APPOINTMENT (OUTPATIENT)
Dept: OBGYN | Facility: CLINIC | Age: 46
End: 2021-07-07
Payer: COMMERCIAL

## 2021-07-07 VITALS
BODY MASS INDEX: 43.43 KG/M2 | HEIGHT: 62 IN | SYSTOLIC BLOOD PRESSURE: 104 MMHG | DIASTOLIC BLOOD PRESSURE: 62 MMHG | WEIGHT: 236 LBS

## 2021-07-07 LAB
BILIRUB UR QL STRIP: NORMAL
CLARITY UR: CLEAR
COLLECTION METHOD: NORMAL
GLUCOSE UR-MCNC: NORMAL
HCG UR QL: 1 EU/DL
HGB UR QL STRIP.AUTO: NORMAL
KETONES UR-MCNC: NORMAL
LEUKOCYTE ESTERASE UR QL STRIP: NORMAL
NITRITE UR QL STRIP: NORMAL
PH UR STRIP: 5.5
PROT UR STRIP-MCNC: NORMAL
SP GR UR STRIP: 1.02

## 2021-07-07 PROCEDURE — 81003 URINALYSIS AUTO W/O SCOPE: CPT | Mod: QW

## 2021-07-07 PROCEDURE — 0502F SUBSEQUENT PRENATAL CARE: CPT | Mod: 25

## 2021-07-07 PROCEDURE — 59025 FETAL NON-STRESS TEST: CPT

## 2021-07-08 ENCOUNTER — APPOINTMENT (OUTPATIENT)
Dept: ANTEPARTUM | Facility: CLINIC | Age: 46
End: 2021-07-08
Payer: COMMERCIAL

## 2021-07-08 ENCOUNTER — ASOB RESULT (OUTPATIENT)
Age: 46
End: 2021-07-08

## 2021-07-08 PROCEDURE — 76820 UMBILICAL ARTERY ECHO: CPT

## 2021-07-08 PROCEDURE — 99072 ADDL SUPL MATRL&STAF TM PHE: CPT

## 2021-07-08 PROCEDURE — 76819 FETAL BIOPHYS PROFIL W/O NST: CPT

## 2021-07-13 ENCOUNTER — NON-APPOINTMENT (OUTPATIENT)
Age: 46
End: 2021-07-13

## 2021-07-15 ENCOUNTER — ASOB RESULT (OUTPATIENT)
Age: 46
End: 2021-07-15

## 2021-07-15 ENCOUNTER — APPOINTMENT (OUTPATIENT)
Dept: ANTEPARTUM | Facility: CLINIC | Age: 46
End: 2021-07-15
Payer: COMMERCIAL

## 2021-07-15 PROCEDURE — 76816 OB US FOLLOW-UP PER FETUS: CPT

## 2021-07-15 PROCEDURE — 99072 ADDL SUPL MATRL&STAF TM PHE: CPT

## 2021-07-15 PROCEDURE — 76818 FETAL BIOPHYS PROFILE W/NST: CPT

## 2021-07-16 ENCOUNTER — NON-APPOINTMENT (OUTPATIENT)
Age: 46
End: 2021-07-16

## 2021-07-16 ENCOUNTER — APPOINTMENT (OUTPATIENT)
Dept: OBGYN | Facility: CLINIC | Age: 46
End: 2021-07-16
Payer: COMMERCIAL

## 2021-07-16 VITALS
SYSTOLIC BLOOD PRESSURE: 122 MMHG | BODY MASS INDEX: 43.61 KG/M2 | DIASTOLIC BLOOD PRESSURE: 60 MMHG | WEIGHT: 237 LBS | HEIGHT: 62 IN

## 2021-07-16 LAB
BILIRUB UR QL STRIP: NORMAL
CLARITY UR: CLEAR
COLLECTION METHOD: NORMAL
GLUCOSE UR-MCNC: NORMAL
HCG UR QL: 2 EU/DL
HGB UR QL STRIP.AUTO: NORMAL
LEUKOCYTE ESTERASE UR QL STRIP: NORMAL
NITRITE UR QL STRIP: NORMAL
PH UR STRIP: 6
PROT UR STRIP-MCNC: NORMAL
SP GR UR STRIP: 1.03

## 2021-07-16 PROCEDURE — 59025 FETAL NON-STRESS TEST: CPT

## 2021-07-16 PROCEDURE — 0502F SUBSEQUENT PRENATAL CARE: CPT

## 2021-07-20 ENCOUNTER — NON-APPOINTMENT (OUTPATIENT)
Age: 46
End: 2021-07-20

## 2021-07-21 ENCOUNTER — NON-APPOINTMENT (OUTPATIENT)
Age: 46
End: 2021-07-21

## 2021-07-21 ENCOUNTER — APPOINTMENT (OUTPATIENT)
Dept: OBGYN | Facility: CLINIC | Age: 46
End: 2021-07-21
Payer: COMMERCIAL

## 2021-07-21 VITALS
SYSTOLIC BLOOD PRESSURE: 100 MMHG | DIASTOLIC BLOOD PRESSURE: 60 MMHG | WEIGHT: 236 LBS | HEIGHT: 62 IN | BODY MASS INDEX: 43.43 KG/M2

## 2021-07-21 LAB
BILIRUB UR QL STRIP: NORMAL
GLUCOSE UR-MCNC: NORMAL
HCG UR QL: 2 EU/DL
HGB UR QL STRIP.AUTO: NORMAL
KETONES UR-MCNC: NORMAL
LEUKOCYTE ESTERASE UR QL STRIP: NORMAL
NITRITE UR QL STRIP: NORMAL
PH UR STRIP: 6.5
PROT UR STRIP-MCNC: NORMAL
SP GR UR STRIP: 1.02

## 2021-07-21 PROCEDURE — 0502F SUBSEQUENT PRENATAL CARE: CPT

## 2021-07-21 PROCEDURE — 59025 FETAL NON-STRESS TEST: CPT

## 2021-07-22 ENCOUNTER — APPOINTMENT (OUTPATIENT)
Dept: ANTEPARTUM | Facility: CLINIC | Age: 46
End: 2021-07-22
Payer: COMMERCIAL

## 2021-07-22 ENCOUNTER — ASOB RESULT (OUTPATIENT)
Age: 46
End: 2021-07-22

## 2021-07-22 PROCEDURE — 76820 UMBILICAL ARTERY ECHO: CPT

## 2021-07-22 PROCEDURE — 99072 ADDL SUPL MATRL&STAF TM PHE: CPT

## 2021-07-22 PROCEDURE — 76819 FETAL BIOPHYS PROFIL W/O NST: CPT

## 2021-07-23 ENCOUNTER — NON-APPOINTMENT (OUTPATIENT)
Age: 46
End: 2021-07-23

## 2021-07-26 ENCOUNTER — APPOINTMENT (OUTPATIENT)
Dept: OBGYN | Facility: CLINIC | Age: 46
End: 2021-07-26
Payer: COMMERCIAL

## 2021-07-26 ENCOUNTER — NON-APPOINTMENT (OUTPATIENT)
Age: 46
End: 2021-07-26

## 2021-07-26 VITALS
BODY MASS INDEX: 43.61 KG/M2 | RESPIRATION RATE: 16 BRPM | TEMPERATURE: 97.9 F | OXYGEN SATURATION: 97 % | HEART RATE: 72 BPM | WEIGHT: 237 LBS | SYSTOLIC BLOOD PRESSURE: 108 MMHG | DIASTOLIC BLOOD PRESSURE: 68 MMHG | HEIGHT: 62 IN

## 2021-07-26 PROCEDURE — 0502F SUBSEQUENT PRENATAL CARE: CPT | Mod: 25

## 2021-07-26 PROCEDURE — 59025 FETAL NON-STRESS TEST: CPT

## 2021-07-27 ENCOUNTER — OUTPATIENT (OUTPATIENT)
Dept: OUTPATIENT SERVICES | Facility: HOSPITAL | Age: 46
LOS: 1 days | End: 2021-07-27
Payer: COMMERCIAL

## 2021-07-27 DIAGNOSIS — O34.219 MATERNAL CARE FOR UNSPECIFIED TYPE SCAR FROM PREVIOUS CESAREAN DELIVERY: Chronic | ICD-10-CM

## 2021-07-27 DIAGNOSIS — Z01.818 ENCOUNTER FOR OTHER PREPROCEDURAL EXAMINATION: ICD-10-CM

## 2021-07-27 DIAGNOSIS — O34.211 MATERNAL CARE FOR LOW TRANSVERSE SCAR FROM PREVIOUS CESAREAN DELIVERY: ICD-10-CM

## 2021-07-27 LAB
BASOPHILS # BLD AUTO: 0.02 K/UL — SIGNIFICANT CHANGE UP (ref 0–0.2)
BASOPHILS NFR BLD AUTO: 0.3 % — SIGNIFICANT CHANGE UP (ref 0–2)
EOSINOPHIL # BLD AUTO: 0.05 K/UL — SIGNIFICANT CHANGE UP (ref 0–0.5)
EOSINOPHIL NFR BLD AUTO: 0.7 % — SIGNIFICANT CHANGE UP (ref 0–6)
HCT VFR BLD CALC: 30.9 % — LOW (ref 34.5–45)
HGB BLD-MCNC: 10 G/DL — LOW (ref 11.5–15.5)
IMM GRANULOCYTES NFR BLD AUTO: 0.3 % — SIGNIFICANT CHANGE UP (ref 0–1.5)
LYMPHOCYTES # BLD AUTO: 1.43 K/UL — SIGNIFICANT CHANGE UP (ref 1–3.3)
LYMPHOCYTES # BLD AUTO: 19.6 % — SIGNIFICANT CHANGE UP (ref 13–44)
MCHC RBC-ENTMCNC: 26.7 PG — LOW (ref 27–34)
MCHC RBC-ENTMCNC: 32.4 GM/DL — SIGNIFICANT CHANGE UP (ref 32–36)
MCV RBC AUTO: 82.4 FL — SIGNIFICANT CHANGE UP (ref 80–100)
MONOCYTES # BLD AUTO: 0.72 K/UL — SIGNIFICANT CHANGE UP (ref 0–0.9)
MONOCYTES NFR BLD AUTO: 9.9 % — SIGNIFICANT CHANGE UP (ref 2–14)
NEUTROPHILS # BLD AUTO: 5.05 K/UL — SIGNIFICANT CHANGE UP (ref 1.8–7.4)
NEUTROPHILS NFR BLD AUTO: 69.2 % — SIGNIFICANT CHANGE UP (ref 43–77)
PLATELET # BLD AUTO: 308 K/UL — SIGNIFICANT CHANGE UP (ref 150–400)
RBC # BLD: 3.75 M/UL — LOW (ref 3.8–5.2)
RBC # FLD: 13.6 % — SIGNIFICANT CHANGE UP (ref 10.3–14.5)
SARS-COV-2 RNA SPEC QL NAA+PROBE: SIGNIFICANT CHANGE UP
WBC # BLD: 7.29 K/UL — SIGNIFICANT CHANGE UP (ref 3.8–10.5)
WBC # FLD AUTO: 7.29 K/UL — SIGNIFICANT CHANGE UP (ref 3.8–10.5)

## 2021-07-27 PROCEDURE — U0003: CPT

## 2021-07-27 PROCEDURE — 36415 COLL VENOUS BLD VENIPUNCTURE: CPT

## 2021-07-27 PROCEDURE — 85025 COMPLETE CBC W/AUTO DIFF WBC: CPT

## 2021-07-27 PROCEDURE — 86850 RBC ANTIBODY SCREEN: CPT

## 2021-07-27 PROCEDURE — 86901 BLOOD TYPING SEROLOGIC RH(D): CPT

## 2021-07-27 PROCEDURE — U0005: CPT

## 2021-07-27 PROCEDURE — 86900 BLOOD TYPING SEROLOGIC ABO: CPT

## 2021-07-28 DIAGNOSIS — Z01.818 ENCOUNTER FOR OTHER PREPROCEDURAL EXAMINATION: ICD-10-CM

## 2021-07-28 DIAGNOSIS — O34.211 MATERNAL CARE FOR LOW TRANSVERSE SCAR FROM PREVIOUS CESAREAN DELIVERY: ICD-10-CM

## 2021-07-29 ENCOUNTER — APPOINTMENT (OUTPATIENT)
Dept: ANTEPARTUM | Facility: CLINIC | Age: 46
End: 2021-07-29

## 2021-07-29 ENCOUNTER — RESULT REVIEW (OUTPATIENT)
Age: 46
End: 2021-07-29

## 2021-07-29 ENCOUNTER — APPOINTMENT (OUTPATIENT)
Dept: OBGYN | Facility: HOSPITAL | Age: 46
End: 2021-07-29

## 2021-07-29 ENCOUNTER — INPATIENT (INPATIENT)
Facility: HOSPITAL | Age: 46
LOS: 1 days | Discharge: HOME | End: 2021-07-31
Attending: OBSTETRICS & GYNECOLOGY | Admitting: OBSTETRICS & GYNECOLOGY
Payer: COMMERCIAL

## 2021-07-29 VITALS — WEIGHT: 233.69 LBS | HEIGHT: 62 IN

## 2021-07-29 DIAGNOSIS — O34.219 MATERNAL CARE FOR UNSPECIFIED TYPE SCAR FROM PREVIOUS CESAREAN DELIVERY: Chronic | ICD-10-CM

## 2021-07-29 DIAGNOSIS — O34.211 MATERNAL CARE FOR LOW TRANSVERSE SCAR FROM PREVIOUS CESAREAN DELIVERY: ICD-10-CM

## 2021-07-29 LAB
ALBUMIN SERPL ELPH-MCNC: 2.5 G/DL — LOW (ref 3.3–5)
ALP SERPL-CCNC: 111 U/L — SIGNIFICANT CHANGE UP (ref 40–120)
ALT FLD-CCNC: 22 U/L — SIGNIFICANT CHANGE UP (ref 12–78)
ANION GAP SERPL CALC-SCNC: 7 MMOL/L — SIGNIFICANT CHANGE UP (ref 5–17)
AST SERPL-CCNC: 20 U/L — SIGNIFICANT CHANGE UP (ref 15–37)
BILIRUB SERPL-MCNC: 0.4 MG/DL — SIGNIFICANT CHANGE UP (ref 0.2–1.2)
BUN SERPL-MCNC: 4 MG/DL — LOW (ref 7–23)
CALCIUM SERPL-MCNC: 8.8 MG/DL — SIGNIFICANT CHANGE UP (ref 8.5–10.1)
CHLORIDE SERPL-SCNC: 107 MMOL/L — SIGNIFICANT CHANGE UP (ref 96–108)
CO2 SERPL-SCNC: 25 MMOL/L — SIGNIFICANT CHANGE UP (ref 22–31)
COVID-19 SPIKE DOMAIN AB INTERP: NEGATIVE — SIGNIFICANT CHANGE UP
COVID-19 SPIKE DOMAIN ANTIBODY RESULT: 0.4 U/ML — SIGNIFICANT CHANGE UP
CREAT SERPL-MCNC: 0.37 MG/DL — LOW (ref 0.5–1.3)
GLUCOSE SERPL-MCNC: 80 MG/DL — SIGNIFICANT CHANGE UP (ref 70–99)
POTASSIUM SERPL-MCNC: 3.3 MMOL/L — LOW (ref 3.5–5.3)
POTASSIUM SERPL-SCNC: 3.3 MMOL/L — LOW (ref 3.5–5.3)
PROT SERPL-MCNC: 6.7 GM/DL — SIGNIFICANT CHANGE UP (ref 6–8.3)
SARS-COV-2 IGG+IGM SERPL QL IA: 0.4 U/ML — SIGNIFICANT CHANGE UP
SARS-COV-2 IGG+IGM SERPL QL IA: NEGATIVE — SIGNIFICANT CHANGE UP
SODIUM SERPL-SCNC: 139 MMOL/L — SIGNIFICANT CHANGE UP (ref 135–145)
T PALLIDUM AB TITR SER: NEGATIVE — SIGNIFICANT CHANGE UP

## 2021-07-29 PROCEDURE — 88302 TISSUE EXAM BY PATHOLOGIST: CPT | Mod: 26

## 2021-07-29 PROCEDURE — 80053 COMPREHEN METABOLIC PANEL: CPT

## 2021-07-29 PROCEDURE — 36415 COLL VENOUS BLD VENIPUNCTURE: CPT

## 2021-07-29 PROCEDURE — 94760 N-INVAS EAR/PLS OXIMETRY 1: CPT

## 2021-07-29 PROCEDURE — 59510 CESAREAN DELIVERY: CPT

## 2021-07-29 PROCEDURE — 86769 SARS-COV-2 COVID-19 ANTIBODY: CPT

## 2021-07-29 PROCEDURE — 86780 TREPONEMA PALLIDUM: CPT

## 2021-07-29 PROCEDURE — 85025 COMPLETE CBC W/AUTO DIFF WBC: CPT

## 2021-07-29 PROCEDURE — 88302 TISSUE EXAM BY PATHOLOGIST: CPT

## 2021-07-29 PROCEDURE — 58611 LIGATE OVIDUCT(S) ADD-ON: CPT

## 2021-07-29 PROCEDURE — C1889: CPT

## 2021-07-29 PROCEDURE — 59050 FETAL MONITOR W/REPORT: CPT

## 2021-07-29 RX ORDER — ENOXAPARIN SODIUM 100 MG/ML
40 INJECTION SUBCUTANEOUS DAILY
Refills: 0 | Status: DISCONTINUED | OUTPATIENT
Start: 2021-07-29 | End: 2021-07-31

## 2021-07-29 RX ORDER — SIMETHICONE 80 MG/1
80 TABLET, CHEWABLE ORAL EVERY 4 HOURS
Refills: 0 | Status: DISCONTINUED | OUTPATIENT
Start: 2021-07-29 | End: 2021-07-31

## 2021-07-29 RX ORDER — OXYTOCIN 10 UNIT/ML
333.33 VIAL (ML) INJECTION
Qty: 20 | Refills: 0 | Status: DISCONTINUED | OUTPATIENT
Start: 2021-07-29 | End: 2021-07-31

## 2021-07-29 RX ORDER — DIPHENHYDRAMINE HCL 50 MG
25 CAPSULE ORAL EVERY 6 HOURS
Refills: 0 | Status: DISCONTINUED | OUTPATIENT
Start: 2021-07-29 | End: 2021-07-31

## 2021-07-29 RX ORDER — METOCLOPRAMIDE HCL 10 MG
10 TABLET ORAL ONCE
Refills: 0 | Status: DISCONTINUED | OUTPATIENT
Start: 2021-07-29 | End: 2021-07-31

## 2021-07-29 RX ORDER — ACETAMINOPHEN 500 MG
1000 TABLET ORAL ONCE
Refills: 0 | Status: COMPLETED | OUTPATIENT
Start: 2021-07-29 | End: 2021-07-29

## 2021-07-29 RX ORDER — SODIUM CHLORIDE 9 MG/ML
1000 INJECTION, SOLUTION INTRAVENOUS
Refills: 0 | Status: DISCONTINUED | OUTPATIENT
Start: 2021-07-29 | End: 2021-07-29

## 2021-07-29 RX ORDER — FAMOTIDINE 10 MG/ML
20 INJECTION INTRAVENOUS ONCE
Refills: 0 | Status: COMPLETED | OUTPATIENT
Start: 2021-07-29 | End: 2021-07-29

## 2021-07-29 RX ORDER — OXYCODONE HYDROCHLORIDE 5 MG/1
5 TABLET ORAL ONCE
Refills: 0 | Status: DISCONTINUED | OUTPATIENT
Start: 2021-07-29 | End: 2021-07-31

## 2021-07-29 RX ORDER — HYDROMORPHONE HYDROCHLORIDE 2 MG/ML
1 INJECTION INTRAMUSCULAR; INTRAVENOUS; SUBCUTANEOUS
Refills: 0 | Status: DISCONTINUED | OUTPATIENT
Start: 2021-07-29 | End: 2021-07-31

## 2021-07-29 RX ORDER — OXYCODONE HYDROCHLORIDE 5 MG/1
10 TABLET ORAL
Refills: 0 | Status: DISCONTINUED | OUTPATIENT
Start: 2021-07-29 | End: 2021-07-31

## 2021-07-29 RX ORDER — MORPHINE SULFATE 50 MG/1
0.1 CAPSULE, EXTENDED RELEASE ORAL ONCE
Refills: 0 | Status: DISCONTINUED | OUTPATIENT
Start: 2021-07-29 | End: 2021-07-31

## 2021-07-29 RX ORDER — IBUPROFEN 200 MG
600 TABLET ORAL EVERY 6 HOURS
Refills: 0 | Status: COMPLETED | OUTPATIENT
Start: 2021-07-29 | End: 2022-06-27

## 2021-07-29 RX ORDER — TETANUS TOXOID, REDUCED DIPHTHERIA TOXOID AND ACELLULAR PERTUSSIS VACCINE, ADSORBED 5; 2.5; 8; 8; 2.5 [IU]/.5ML; [IU]/.5ML; UG/.5ML; UG/.5ML; UG/.5ML
0.5 SUSPENSION INTRAMUSCULAR ONCE
Refills: 0 | Status: DISCONTINUED | OUTPATIENT
Start: 2021-07-29 | End: 2021-07-31

## 2021-07-29 RX ORDER — ONDANSETRON 8 MG/1
4 TABLET, FILM COATED ORAL EVERY 6 HOURS
Refills: 0 | Status: DISCONTINUED | OUTPATIENT
Start: 2021-07-29 | End: 2021-07-31

## 2021-07-29 RX ORDER — SODIUM CHLORIDE 9 MG/ML
1000 INJECTION, SOLUTION INTRAVENOUS ONCE
Refills: 0 | Status: DISCONTINUED | OUTPATIENT
Start: 2021-07-29 | End: 2021-07-29

## 2021-07-29 RX ORDER — NALOXONE HYDROCHLORIDE 4 MG/.1ML
0.1 SPRAY NASAL
Refills: 0 | Status: DISCONTINUED | OUTPATIENT
Start: 2021-07-29 | End: 2021-07-31

## 2021-07-29 RX ORDER — SODIUM CHLORIDE 9 MG/ML
1000 INJECTION, SOLUTION INTRAVENOUS
Refills: 0 | Status: DISCONTINUED | OUTPATIENT
Start: 2021-07-29 | End: 2021-07-31

## 2021-07-29 RX ORDER — LANOLIN
1 OINTMENT (GRAM) TOPICAL EVERY 6 HOURS
Refills: 0 | Status: DISCONTINUED | OUTPATIENT
Start: 2021-07-29 | End: 2021-07-31

## 2021-07-29 RX ORDER — ACETAMINOPHEN 500 MG
975 TABLET ORAL
Refills: 0 | Status: DISCONTINUED | OUTPATIENT
Start: 2021-07-29 | End: 2021-07-31

## 2021-07-29 RX ORDER — CITRIC ACID/SODIUM CITRATE 300-500 MG
30 SOLUTION, ORAL ORAL ONCE
Refills: 0 | Status: COMPLETED | OUTPATIENT
Start: 2021-07-29 | End: 2021-07-29

## 2021-07-29 RX ORDER — OXYCODONE HYDROCHLORIDE 5 MG/1
5 TABLET ORAL
Refills: 0 | Status: DISCONTINUED | OUTPATIENT
Start: 2021-07-29 | End: 2021-07-31

## 2021-07-29 RX ORDER — MAGNESIUM HYDROXIDE 400 MG/1
30 TABLET, CHEWABLE ORAL
Refills: 0 | Status: DISCONTINUED | OUTPATIENT
Start: 2021-07-29 | End: 2021-07-31

## 2021-07-29 RX ORDER — KETOROLAC TROMETHAMINE 30 MG/ML
30 SYRINGE (ML) INJECTION EVERY 6 HOURS
Refills: 0 | Status: DISCONTINUED | OUTPATIENT
Start: 2021-07-29 | End: 2021-07-30

## 2021-07-29 RX ORDER — CEFAZOLIN SODIUM 1 G
2000 VIAL (EA) INJECTION ONCE
Refills: 0 | Status: COMPLETED | OUTPATIENT
Start: 2021-07-29 | End: 2021-07-29

## 2021-07-29 RX ADMIN — FAMOTIDINE 20 MILLIGRAM(S): 10 INJECTION INTRAVENOUS at 09:22

## 2021-07-29 RX ADMIN — Medication 975 MILLIGRAM(S): at 22:00

## 2021-07-29 RX ADMIN — Medication 30 MILLIGRAM(S): at 18:28

## 2021-07-29 RX ADMIN — Medication 975 MILLIGRAM(S): at 21:18

## 2021-07-29 RX ADMIN — Medication 30 MILLIGRAM(S): at 20:00

## 2021-07-29 RX ADMIN — Medication 100 MILLIGRAM(S): at 10:05

## 2021-07-29 RX ADMIN — ENOXAPARIN SODIUM 40 MILLIGRAM(S): 100 INJECTION SUBCUTANEOUS at 22:12

## 2021-07-29 RX ADMIN — Medication 30 MILLIGRAM(S): at 11:31

## 2021-07-29 RX ADMIN — Medication 30 MILLILITER(S): at 09:22

## 2021-07-29 RX ADMIN — Medication 1000 MILLIUNIT(S)/MIN: at 12:14

## 2021-07-29 RX ADMIN — Medication 400 MILLIGRAM(S): at 12:40

## 2021-07-29 NOTE — PATIENT PROFILE OB - ALERT: PERTINENT HISTORY
Patient desires tubal ligation/1st Trimester Sonogram/20 Week Level II Sonogram/Follow up Sonogram for Growth/Fetal Non-Stress Test (NST)

## 2021-07-30 LAB
BASOPHILS # BLD AUTO: 0.02 K/UL — SIGNIFICANT CHANGE UP (ref 0–0.2)
BASOPHILS NFR BLD AUTO: 0.2 % — SIGNIFICANT CHANGE UP (ref 0–2)
EOSINOPHIL # BLD AUTO: 0.04 K/UL — SIGNIFICANT CHANGE UP (ref 0–0.5)
EOSINOPHIL NFR BLD AUTO: 0.4 % — SIGNIFICANT CHANGE UP (ref 0–6)
HCT VFR BLD CALC: 26.8 % — LOW (ref 34.5–45)
HGB BLD-MCNC: 8.8 G/DL — LOW (ref 11.5–15.5)
IMM GRANULOCYTES NFR BLD AUTO: 0.4 % — SIGNIFICANT CHANGE UP (ref 0–1.5)
LYMPHOCYTES # BLD AUTO: 1.18 K/UL — SIGNIFICANT CHANGE UP (ref 1–3.3)
LYMPHOCYTES # BLD AUTO: 11.7 % — LOW (ref 13–44)
MCHC RBC-ENTMCNC: 27.2 PG — SIGNIFICANT CHANGE UP (ref 27–34)
MCHC RBC-ENTMCNC: 32.8 GM/DL — SIGNIFICANT CHANGE UP (ref 32–36)
MCV RBC AUTO: 83 FL — SIGNIFICANT CHANGE UP (ref 80–100)
MONOCYTES # BLD AUTO: 1.08 K/UL — HIGH (ref 0–0.9)
MONOCYTES NFR BLD AUTO: 10.7 % — SIGNIFICANT CHANGE UP (ref 2–14)
NEUTROPHILS # BLD AUTO: 7.74 K/UL — HIGH (ref 1.8–7.4)
NEUTROPHILS NFR BLD AUTO: 76.6 % — SIGNIFICANT CHANGE UP (ref 43–77)
PLATELET # BLD AUTO: 275 K/UL — SIGNIFICANT CHANGE UP (ref 150–400)
RBC # BLD: 3.23 M/UL — LOW (ref 3.8–5.2)
RBC # FLD: 13.8 % — SIGNIFICANT CHANGE UP (ref 10.3–14.5)
WBC # BLD: 10.1 K/UL — SIGNIFICANT CHANGE UP (ref 3.8–10.5)
WBC # FLD AUTO: 10.1 K/UL — SIGNIFICANT CHANGE UP (ref 3.8–10.5)

## 2021-07-30 RX ORDER — IBUPROFEN 200 MG
600 TABLET ORAL EVERY 6 HOURS
Refills: 0 | Status: DISCONTINUED | OUTPATIENT
Start: 2021-07-30 | End: 2021-07-31

## 2021-07-30 RX ADMIN — MAGNESIUM HYDROXIDE 30 MILLILITER(S): 400 TABLET, CHEWABLE ORAL at 19:08

## 2021-07-30 RX ADMIN — Medication 975 MILLIGRAM(S): at 09:28

## 2021-07-30 RX ADMIN — Medication 975 MILLIGRAM(S): at 17:17

## 2021-07-30 RX ADMIN — Medication 30 MILLIGRAM(S): at 06:34

## 2021-07-30 RX ADMIN — Medication 600 MILLIGRAM(S): at 19:09

## 2021-07-30 RX ADMIN — OXYCODONE HYDROCHLORIDE 10 MILLIGRAM(S): 5 TABLET ORAL at 02:34

## 2021-07-30 RX ADMIN — Medication 30 MILLIGRAM(S): at 00:10

## 2021-07-30 RX ADMIN — Medication 975 MILLIGRAM(S): at 02:33

## 2021-07-30 RX ADMIN — OXYCODONE HYDROCHLORIDE 10 MILLIGRAM(S): 5 TABLET ORAL at 04:02

## 2021-07-30 RX ADMIN — SIMETHICONE 80 MILLIGRAM(S): 80 TABLET, CHEWABLE ORAL at 19:09

## 2021-07-30 RX ADMIN — Medication 975 MILLIGRAM(S): at 04:01

## 2021-07-30 RX ADMIN — Medication 30 MILLIGRAM(S): at 01:11

## 2021-07-30 RX ADMIN — Medication 600 MILLIGRAM(S): at 13:02

## 2021-07-30 RX ADMIN — Medication 975 MILLIGRAM(S): at 23:29

## 2021-07-30 RX ADMIN — OXYCODONE HYDROCHLORIDE 5 MILLIGRAM(S): 5 TABLET ORAL at 23:29

## 2021-07-30 RX ADMIN — Medication 30 MILLIGRAM(S): at 07:37

## 2021-07-30 RX ADMIN — SIMETHICONE 80 MILLIGRAM(S): 80 TABLET, CHEWABLE ORAL at 23:29

## 2021-07-30 RX ADMIN — ENOXAPARIN SODIUM 40 MILLIGRAM(S): 100 INJECTION SUBCUTANEOUS at 21:27

## 2021-07-31 ENCOUNTER — TRANSCRIPTION ENCOUNTER (OUTPATIENT)
Age: 46
End: 2021-07-31

## 2021-07-31 VITALS
DIASTOLIC BLOOD PRESSURE: 76 MMHG | RESPIRATION RATE: 18 BRPM | OXYGEN SATURATION: 99 % | SYSTOLIC BLOOD PRESSURE: 139 MMHG | HEART RATE: 89 BPM | TEMPERATURE: 99 F

## 2021-07-31 RX ORDER — ACETAMINOPHEN 500 MG
3 TABLET ORAL
Qty: 0 | Refills: 0 | DISCHARGE
Start: 2021-07-31

## 2021-07-31 RX ORDER — CX-024414 0.2 MG/ML
0.5 INJECTION, SUSPENSION INTRAMUSCULAR ONCE
Refills: 0 | Status: COMPLETED | OUTPATIENT
Start: 2021-07-31 | End: 2021-07-31

## 2021-07-31 RX ORDER — LANOLIN
1 OINTMENT (GRAM) TOPICAL
Qty: 0 | Refills: 0 | DISCHARGE
Start: 2021-07-31

## 2021-07-31 RX ORDER — LEVOTHYROXINE SODIUM 125 MCG
25 TABLET ORAL DAILY
Refills: 0 | Status: DISCONTINUED | OUTPATIENT
Start: 2021-07-31 | End: 2021-07-31

## 2021-07-31 RX ORDER — IBUPROFEN 200 MG
1 TABLET ORAL
Qty: 0 | Refills: 0 | DISCHARGE
Start: 2021-07-31

## 2021-07-31 RX ADMIN — Medication 600 MILLIGRAM(S): at 01:27

## 2021-07-31 RX ADMIN — Medication 975 MILLIGRAM(S): at 16:06

## 2021-07-31 RX ADMIN — Medication 975 MILLIGRAM(S): at 11:00

## 2021-07-31 RX ADMIN — Medication 600 MILLIGRAM(S): at 13:23

## 2021-07-31 RX ADMIN — Medication 600 MILLIGRAM(S): at 08:30

## 2021-07-31 RX ADMIN — Medication 975 MILLIGRAM(S): at 10:13

## 2021-07-31 RX ADMIN — CX-024414 0.5 MILLILITER(S): 0.2 INJECTION, SUSPENSION INTRAMUSCULAR at 20:13

## 2021-07-31 RX ADMIN — Medication 600 MILLIGRAM(S): at 07:30

## 2021-07-31 RX ADMIN — Medication 975 MILLIGRAM(S): at 15:05

## 2021-07-31 RX ADMIN — Medication 600 MILLIGRAM(S): at 18:34

## 2021-07-31 RX ADMIN — Medication 600 MILLIGRAM(S): at 14:10

## 2021-07-31 NOTE — DISCHARGE NOTE OB - HOSPITAL COURSE
The patient underwent an elective repeat  section and bilateral salpingectomy without complication. Her post-partum course was uneventful.

## 2021-07-31 NOTE — DISCHARGE NOTE OB - CARE PLAN
Assessment and plan of treatment:	Patient should transition to regular activity level. Resume regular diet. Patient should follow up with her OB for incision check in 1 week and again for postpartum checkup in 6 weeks. Patient should call her doctor sooner if she develops fever, uncontrolled vaginal bleeding, or signs of infection (draining pus, swelling) from incision site. Take medications as directed. Exclusive breast feeding for the first 6 months is recommended. Nothing per vagina for 6 weeks (incl. sex, douching, etc). If you have additional concerns, please inform your provider.   Principal Discharge DX:	Delivery by  section of full-term infant  Goal:	return to prepregnancy physical status  Assessment and plan of treatment:	Patient should transition to regular activity level. Resume regular diet. Patient should follow up with her OB for incision check in 1 week and again for postpartum checkup in 6 weeks. Patient should call her doctor sooner if she develops fever, uncontrolled vaginal bleeding, or signs of infection (draining pus, swelling) from incision site. Take medications as directed. Exclusive breast feeding for the first 6 months is recommended. Nothing per vagina for 6 weeks (incl. sex, douching, etc). If you have additional concerns, please inform your provider.

## 2021-07-31 NOTE — DISCHARGE NOTE OB - PLAN OF CARE
Patient should transition to regular activity level. Resume regular diet. Patient should follow up with her OB for incision check in 1 week and again for postpartum checkup in 6 weeks. Patient should call her doctor sooner if she develops fever, uncontrolled vaginal bleeding, or signs of infection (draining pus, swelling) from incision site. Take medications as directed. Exclusive breast feeding for the first 6 months is recommended. Nothing per vagina for 6 weeks (incl. sex, douching, etc). If you have additional concerns, please inform your provider. return to prepregnancy physical status

## 2021-07-31 NOTE — PROGRESS NOTE ADULT - ASSESSMENT
ANGUS SANCHEZ is a 46 yo  now POD#2 s/p repeat  section and BS @ 39w2d. Healthy female infant.     PLAN:  Patient feels well  Hgb: 10.0 --> 8.8  Continue the current pain medication  Encourage  Ambulation  Encourage regular diet  DVT ppx: SCDs only when not ambulating + Lovenox 40mg QD  Desires to go home today.

## 2021-07-31 NOTE — DISCHARGE NOTE OB - CARE PROVIDER_API CALL
Frank Pavon)  Obstetrics and Gynecology  [address] [place] [address]  Phone: [phone]  Fax: [phone]  Follow Up Time:

## 2021-07-31 NOTE — DISCHARGE NOTE OB - PATIENT PORTAL LINK FT
You can access the FollowMyHealth Patient Portal offered by [facility] by registering at the following website: [email or web address]. By joining [employer]’s FollowMyHealth portal, you will also be able to view your health information using other applications (apps) compatible with our system.

## 2021-07-31 NOTE — DISCHARGE NOTE OB - MEDICATION SUMMARY - MEDICATIONS TO TAKE
I will START or STAY ON the medications listed below when I get home from the hospital:    acetaminophen 325 mg oral tablet  -- 3 tab(s) by mouth   -- Indication: For for mild pain    ibuprofen 600 mg oral tablet  -- 1 tab(s) by mouth every 6 hours  -- Indication: For for moderate pain    lanolin topical ointment  -- 1 application on skin every 6 hours, As needed, Sore Nipples  -- Indication: For for breast pain    Synthroid 25 mcg (0.025 mg) oral tablet  -- Indication: For for hormone balance

## 2021-07-31 NOTE — PROGRESS NOTE ADULT - SUBJECTIVE AND OBJECTIVE BOX
Postpartum Note,  Section  She is a  45y woman who is now post-operative day: #1    Subjective:  The patient feels well.  She is ambulating.   She is tolerating regular diet.  She denies nausea and vomiting.  She is voiding.  Her pain is controlled.  She reports normal postpartum bleeding.        Physical exam:    Vital Signs Last 24 Hrs  T(C): 36.9 (2021 08:11), Max: 37.1 (2021 21:20)  T(F): 98.4 (2021 08:11), Max: 98.8 (2021 21:20)  HR: 97 (2021 08:11) (72 - 105)  BP: 124/65 (2021 08:11) (112/67 - 125/83)  BP(mean): --  RR: 18 (2021 08:11) (15 - 22)  SpO2: 97% (2021 08:11) (96% - 100%)    Gen: NAD  Breast: Soft, nontender, not engorged.  Abdomen: Soft, nontender, no distension , firm uterine fundus at umbilicus.  Incision: Clean, dry, and intact with steri strips  Pelvic: Normal lochia noted  Ext: No calf tenderness    LABS:                        8.8    10.10 )-----------( 275      ( 2021 07:37 )             26.8       Rubella status:     Allergies    No Known Allergies    Intolerances      MEDICATIONS  (STANDING):  acetaminophen   Tablet .. 975 milliGRAM(s) Oral <User Schedule>  diphtheria/tetanus/pertussis (acellular) Vaccine (ADAcel) 0.5 milliLiter(s) IntraMuscular once  enoxaparin Injectable 40 milliGRAM(s) SubCutaneous daily  ibuprofen  Tablet. 600 milliGRAM(s) Oral every 6 hours  lactated ringers. 1000 milliLiter(s) (125 mL/Hr) IV Continuous <Continuous>  metoclopramide Injectable 10 milliGRAM(s) IV Push once  morphine PF Spinal 0.1 milliGRAM(s) IntraThecal. once  oxytocin Infusion 333.333 milliUNIT(s)/Min (1000 mL/Hr) IV Continuous <Continuous>  oxytocin Infusion 333.333 milliUNIT(s)/Min (1000 mL/Hr) IV Continuous <Continuous>    MEDICATIONS  (PRN):  diphenhydrAMINE 25 milliGRAM(s) Oral every 6 hours PRN Pruritus  HYDROmorphone  Injectable 1 milliGRAM(s) IV Push every 3 hours PRN Severe Pain (7 - 10)  lanolin Ointment 1 Application(s) Topical every 6 hours PRN Sore Nipples  magnesium hydroxide Suspension 30 milliLiter(s) Oral two times a day PRN Constipation  naloxone Injectable 0.1 milliGRAM(s) IV Push every 3 minutes PRN For ANY of the following changes in patient status:  A. RR LESS THAN 10 breaths per minute, B. Oxygen saturation LESS THAN 90%, C. Sedation score of 6  ondansetron Injectable 4 milliGRAM(s) IV Push every 6 hours PRN Nausea  oxyCODONE    IR 5 milliGRAM(s) Oral every 3 hours PRN Mild Pain (1 - 3)  oxyCODONE    IR 10 milliGRAM(s) Oral every 3 hours PRN Moderate Pain (4 - 6)  oxyCODONE    IR 5 milliGRAM(s) Oral every 3 hours PRN Moderate to Severe Pain (4-10)  oxyCODONE    IR 5 milliGRAM(s) Oral once PRN Moderate to Severe Pain (4-10)  simethicone 80 milliGRAM(s) Chew every 4 hours PRN Gas        Assessment and Plan  POD #1 s/p c/sec  Doing well.  Encourage ambulation.  Cont progressive care           
ANGUS SANCHEZ is a 44 yo  now POD#2 s/p repeat  section and BS @ 39w2d. Healthy female infant.     S:    She is doing well, has no complaints.   Pain controlled with medication   She is ambulating without difficulty and tolerating PO.   + flatus/-BM     O:    T(C): 36.8 (21 @ 00:15), Max: 37 (21 @ 20:00)  HR: 100 (21 @ 00:15) (97 - 110)  BP: 130/86 (21 @ 00:15) (110/73 - 130/86)  RR: 17 (21 @ 00:15) (17 - 18)  SpO2: 98% (21 @ 00:15) (97% - 100%)    Breast: Nontender, not engorged   CV: RRR  RESP: CTA b/l  Abdomen: Softly distended, appropriately-tender, non-distended, +bowel sounds.  Uterus:  Fundus firm below umbilicus  Incision: Mepilex in place.  Clean/dry/intact  VE:  +lochia  Ext:  Non-tender, no edema                           8.8    10.10 )-----------( 275      ( 2021 07:37 )             26.8

## 2021-08-02 ENCOUNTER — NON-APPOINTMENT (OUTPATIENT)
Age: 46
End: 2021-08-02

## 2021-08-04 DIAGNOSIS — Z3A.39 39 WEEKS GESTATION OF PREGNANCY: ICD-10-CM

## 2021-08-04 DIAGNOSIS — O34.211 MATERNAL CARE FOR LOW TRANSVERSE SCAR FROM PREVIOUS CESAREAN DELIVERY: ICD-10-CM

## 2021-08-04 DIAGNOSIS — E03.9 HYPOTHYROIDISM, UNSPECIFIED: ICD-10-CM

## 2021-08-04 DIAGNOSIS — Z30.2 ENCOUNTER FOR STERILIZATION: ICD-10-CM

## 2021-08-05 ENCOUNTER — INPATIENT (INPATIENT)
Facility: HOSPITAL | Age: 46
LOS: 3 days | Discharge: HOME | DRG: 776 | End: 2021-08-09
Attending: OBSTETRICS & GYNECOLOGY | Admitting: OBSTETRICS & GYNECOLOGY
Payer: COMMERCIAL

## 2021-08-05 VITALS — WEIGHT: 238.98 LBS | HEIGHT: 62 IN

## 2021-08-05 DIAGNOSIS — O34.219 MATERNAL CARE FOR UNSPECIFIED TYPE SCAR FROM PREVIOUS CESAREAN DELIVERY: Chronic | ICD-10-CM
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
## 2021-08-05 LAB
ALBUMIN SERPL ELPH-MCNC: 2.8 G/DL — LOW (ref 3.3–5)
ALP SERPL-CCNC: 104 U/L — SIGNIFICANT CHANGE UP (ref 40–120)
ALT FLD-CCNC: 33 U/L — SIGNIFICANT CHANGE UP (ref 12–78)
ANION GAP SERPL CALC-SCNC: 9 MMOL/L — SIGNIFICANT CHANGE UP (ref 5–17)
APPEARANCE UR: CLEAR — SIGNIFICANT CHANGE UP
APTT BLD: 26.6 SEC — LOW (ref 27.5–35.5)
AST SERPL-CCNC: 22 U/L — SIGNIFICANT CHANGE UP (ref 15–37)
BASOPHILS # BLD AUTO: 0.03 K/UL — SIGNIFICANT CHANGE UP (ref 0–0.2)
BASOPHILS NFR BLD AUTO: 0.2 % — SIGNIFICANT CHANGE UP (ref 0–2)
BILIRUB SERPL-MCNC: 0.5 MG/DL — SIGNIFICANT CHANGE UP (ref 0.2–1.2)
BILIRUB UR-MCNC: NEGATIVE — SIGNIFICANT CHANGE UP
BUN SERPL-MCNC: 6 MG/DL — LOW (ref 7–23)
CALCIUM SERPL-MCNC: 8.9 MG/DL — SIGNIFICANT CHANGE UP (ref 8.5–10.1)
CHLORIDE SERPL-SCNC: 108 MMOL/L — SIGNIFICANT CHANGE UP (ref 96–108)
CO2 SERPL-SCNC: 25 MMOL/L — SIGNIFICANT CHANGE UP (ref 22–31)
COLOR SPEC: YELLOW — SIGNIFICANT CHANGE UP
CREAT SERPL-MCNC: 0.45 MG/DL — LOW (ref 0.5–1.3)
DIFF PNL FLD: ABNORMAL
EOSINOPHIL # BLD AUTO: 0.03 K/UL — SIGNIFICANT CHANGE UP (ref 0–0.5)
EOSINOPHIL NFR BLD AUTO: 0.2 % — SIGNIFICANT CHANGE UP (ref 0–6)
GLUCOSE SERPL-MCNC: 60 MG/DL — LOW (ref 70–99)
GLUCOSE UR QL: NEGATIVE MG/DL — SIGNIFICANT CHANGE UP
HCG UR QL: POSITIVE
HCT VFR BLD CALC: 32.4 % — LOW (ref 34.5–45)
HGB BLD-MCNC: 10.2 G/DL — LOW (ref 11.5–15.5)
IMM GRANULOCYTES NFR BLD AUTO: 0.5 % — SIGNIFICANT CHANGE UP (ref 0–1.5)
INR BLD: 1.09 RATIO — SIGNIFICANT CHANGE UP (ref 0.88–1.16)
KETONES UR-MCNC: ABNORMAL
LACTATE SERPL-SCNC: 1.1 MMOL/L — SIGNIFICANT CHANGE UP (ref 0.7–2)
LEUKOCYTE ESTERASE UR-ACNC: NEGATIVE — SIGNIFICANT CHANGE UP
LYMPHOCYTES # BLD AUTO: 1.55 K/UL — SIGNIFICANT CHANGE UP (ref 1–3.3)
LYMPHOCYTES # BLD AUTO: 9.8 % — LOW (ref 13–44)
MCHC RBC-ENTMCNC: 26.3 PG — LOW (ref 27–34)
MCHC RBC-ENTMCNC: 31.5 GM/DL — LOW (ref 32–36)
MCV RBC AUTO: 83.5 FL — SIGNIFICANT CHANGE UP (ref 80–100)
MONOCYTES # BLD AUTO: 1.17 K/UL — HIGH (ref 0–0.9)
MONOCYTES NFR BLD AUTO: 7.4 % — SIGNIFICANT CHANGE UP (ref 2–14)
NEUTROPHILS # BLD AUTO: 12.93 K/UL — HIGH (ref 1.8–7.4)
NEUTROPHILS NFR BLD AUTO: 81.9 % — HIGH (ref 43–77)
NITRITE UR-MCNC: NEGATIVE — SIGNIFICANT CHANGE UP
PH UR: 5 — SIGNIFICANT CHANGE UP (ref 5–8)
PLATELET # BLD AUTO: 505 K/UL — HIGH (ref 150–400)
POTASSIUM SERPL-MCNC: 3.3 MMOL/L — LOW (ref 3.5–5.3)
POTASSIUM SERPL-SCNC: 3.3 MMOL/L — LOW (ref 3.5–5.3)
PROT SERPL-MCNC: 7.5 GM/DL — SIGNIFICANT CHANGE UP (ref 6–8.3)
PROT UR-MCNC: 30 MG/DL
PROTHROM AB SERPL-ACNC: 12.6 SEC — SIGNIFICANT CHANGE UP (ref 10.6–13.6)
RBC # BLD: 3.88 M/UL — SIGNIFICANT CHANGE UP (ref 3.8–5.2)
RBC # FLD: 15 % — HIGH (ref 10.3–14.5)
SODIUM SERPL-SCNC: 142 MMOL/L — SIGNIFICANT CHANGE UP (ref 135–145)
SP GR SPEC: 1.01 — SIGNIFICANT CHANGE UP (ref 1.01–1.02)
UROBILINOGEN FLD QL: NEGATIVE MG/DL — SIGNIFICANT CHANGE UP
WBC # BLD: 15.79 K/UL — HIGH (ref 3.8–10.5)
WBC # FLD AUTO: 15.79 K/UL — HIGH (ref 3.8–10.5)

## 2021-08-05 PROCEDURE — C1769: CPT

## 2021-08-05 PROCEDURE — 87070 CULTURE OTHR SPECIMN AEROBIC: CPT

## 2021-08-05 PROCEDURE — 71046 X-RAY EXAM CHEST 2 VIEWS: CPT | Mod: 26

## 2021-08-05 PROCEDURE — 71046 X-RAY EXAM CHEST 2 VIEWS: CPT

## 2021-08-05 PROCEDURE — 49406 IMAGE CATH FLUID PERI/RETRO: CPT

## 2021-08-05 PROCEDURE — 83605 ASSAY OF LACTIC ACID: CPT

## 2021-08-05 PROCEDURE — 87075 CULTR BACTERIA EXCEPT BLOOD: CPT

## 2021-08-05 PROCEDURE — 86769 SARS-COV-2 COVID-19 ANTIBODY: CPT

## 2021-08-05 PROCEDURE — 80048 BASIC METABOLIC PNL TOTAL CA: CPT

## 2021-08-05 PROCEDURE — 87186 SC STD MICRODIL/AGAR DIL: CPT

## 2021-08-05 PROCEDURE — 84100 ASSAY OF PHOSPHORUS: CPT

## 2021-08-05 PROCEDURE — 85025 COMPLETE CBC W/AUTO DIFF WBC: CPT

## 2021-08-05 PROCEDURE — 99285 EMERGENCY DEPT VISIT HI MDM: CPT

## 2021-08-05 PROCEDURE — 74177 CT ABD & PELVIS W/CONTRAST: CPT | Mod: 26,MA

## 2021-08-05 PROCEDURE — 82962 GLUCOSE BLOOD TEST: CPT

## 2021-08-05 PROCEDURE — C1729: CPT

## 2021-08-05 PROCEDURE — 36415 COLL VENOUS BLD VENIPUNCTURE: CPT

## 2021-08-05 PROCEDURE — 84145 PROCALCITONIN (PCT): CPT

## 2021-08-05 PROCEDURE — 83735 ASSAY OF MAGNESIUM: CPT

## 2021-08-05 PROCEDURE — C1889: CPT

## 2021-08-05 PROCEDURE — 85027 COMPLETE CBC AUTOMATED: CPT

## 2021-08-05 PROCEDURE — 87077 CULTURE AEROBIC IDENTIFY: CPT

## 2021-08-05 PROCEDURE — 93010 ELECTROCARDIOGRAM REPORT: CPT

## 2021-08-05 RX ORDER — MORPHINE SULFATE 50 MG/1
2 CAPSULE, EXTENDED RELEASE ORAL EVERY 6 HOURS
Refills: 0 | Status: DISCONTINUED | OUTPATIENT
Start: 2021-08-05 | End: 2021-08-09

## 2021-08-05 RX ORDER — SODIUM CHLORIDE 9 MG/ML
1000 INJECTION, SOLUTION INTRAVENOUS
Refills: 0 | Status: DISCONTINUED | OUTPATIENT
Start: 2021-08-05 | End: 2021-08-07

## 2021-08-05 RX ORDER — PIPERACILLIN AND TAZOBACTAM 4; .5 G/20ML; G/20ML
3.38 INJECTION, POWDER, LYOPHILIZED, FOR SOLUTION INTRAVENOUS EVERY 8 HOURS
Refills: 0 | Status: DISCONTINUED | OUTPATIENT
Start: 2021-08-06 | End: 2021-08-09

## 2021-08-05 RX ORDER — MORPHINE SULFATE 50 MG/1
4 CAPSULE, EXTENDED RELEASE ORAL ONCE
Refills: 0 | Status: DISCONTINUED | OUTPATIENT
Start: 2021-08-05 | End: 2021-08-05

## 2021-08-05 RX ORDER — ACETAMINOPHEN 500 MG
975 TABLET ORAL ONCE
Refills: 0 | Status: COMPLETED | OUTPATIENT
Start: 2021-08-05 | End: 2021-08-05

## 2021-08-05 RX ORDER — VANCOMYCIN HCL 1 G
1000 VIAL (EA) INTRAVENOUS ONCE
Refills: 0 | Status: DISCONTINUED | OUTPATIENT
Start: 2021-08-05 | End: 2021-08-05

## 2021-08-05 RX ORDER — SODIUM CHLORIDE 9 MG/ML
2500 INJECTION INTRAMUSCULAR; INTRAVENOUS; SUBCUTANEOUS ONCE
Refills: 0 | Status: COMPLETED | OUTPATIENT
Start: 2021-08-05 | End: 2021-08-05

## 2021-08-05 RX ORDER — PIPERACILLIN AND TAZOBACTAM 4; .5 G/20ML; G/20ML
3.38 INJECTION, POWDER, LYOPHILIZED, FOR SOLUTION INTRAVENOUS ONCE
Refills: 0 | Status: COMPLETED | OUTPATIENT
Start: 2021-08-05 | End: 2021-08-05

## 2021-08-05 RX ORDER — OXYCODONE HYDROCHLORIDE 5 MG/1
5 TABLET ORAL
Refills: 0 | Status: COMPLETED | OUTPATIENT
Start: 2021-08-05 | End: 2021-08-12

## 2021-08-05 RX ORDER — OXYCODONE HYDROCHLORIDE 5 MG/1
5 TABLET ORAL ONCE
Refills: 0 | Status: DISCONTINUED | OUTPATIENT
Start: 2021-08-05 | End: 2021-08-09

## 2021-08-05 RX ORDER — VANCOMYCIN HCL 1 G
1500 VIAL (EA) INTRAVENOUS ONCE
Refills: 0 | Status: COMPLETED | OUTPATIENT
Start: 2021-08-05 | End: 2021-08-05

## 2021-08-05 RX ORDER — IBUPROFEN 200 MG
600 TABLET ORAL EVERY 6 HOURS
Refills: 0 | Status: DISCONTINUED | OUTPATIENT
Start: 2021-08-05 | End: 2021-08-09

## 2021-08-05 RX ORDER — ACETAMINOPHEN 500 MG
975 TABLET ORAL
Refills: 0 | Status: DISCONTINUED | OUTPATIENT
Start: 2021-08-05 | End: 2021-08-09

## 2021-08-05 RX ADMIN — MORPHINE SULFATE 4 MILLIGRAM(S): 50 CAPSULE, EXTENDED RELEASE ORAL at 20:00

## 2021-08-05 RX ADMIN — Medication 975 MILLIGRAM(S): at 20:00

## 2021-08-05 RX ADMIN — PIPERACILLIN AND TAZOBACTAM 3.38 GRAM(S): 4; .5 INJECTION, POWDER, LYOPHILIZED, FOR SOLUTION INTRAVENOUS at 20:00

## 2021-08-05 RX ADMIN — MORPHINE SULFATE 4 MILLIGRAM(S): 50 CAPSULE, EXTENDED RELEASE ORAL at 16:57

## 2021-08-05 RX ADMIN — SODIUM CHLORIDE 2500 MILLILITER(S): 9 INJECTION INTRAMUSCULAR; INTRAVENOUS; SUBCUTANEOUS at 19:12

## 2021-08-05 RX ADMIN — PIPERACILLIN AND TAZOBACTAM 200 GRAM(S): 4; .5 INJECTION, POWDER, LYOPHILIZED, FOR SOLUTION INTRAVENOUS at 19:12

## 2021-08-05 RX ADMIN — Medication 975 MILLIGRAM(S): at 19:11

## 2021-08-05 RX ADMIN — SODIUM CHLORIDE 2500 MILLILITER(S): 9 INJECTION INTRAMUSCULAR; INTRAVENOUS; SUBCUTANEOUS at 20:00

## 2021-08-05 NOTE — H&P ADULT - HISTORY OF PRESENT ILLNESS
GYNECOLOGIC H&P NOTE    Patient is a 44yo  POD#7 from uncomplicated rCS who presents to the ED with abdominal pain since . Pain is in LLQ and RLQ, sharp, 10/10, constant, mildly alleviated with Tylenol and oxycodone, without exacerbating factors, and associated with fevers and chills. Highest fever at home was 100/9F on . She also endorses difficulty to urinate. Last BM was a couple of days ago, +flatus. She is ambulating without difficulty. Breastfeeding and formula feeding. She denies SOB, chest pain, headaches, visual disturbances, RUQ pain, epigastric pain, new-onset edema, vaginal bleeding, nausea, vomiting, changes in bowel habits. No other complaints at this time.     PAST OBSTETRICAL HISTORY:  SAB x2, C/S x2    PAST GYNECOLOGIC HISTORY   LMP unknown   Denies fibroids, cysts, STD hx and abnormal PAP smears     PAST MEDICAL HISTORY:  Hypothyroidism     PAST SURGICAL HISTORY:  C/S x2    Home Medications:  acetaminophen 325 mg oral tablet: 3 tab(s) orally  (2021 11:38)  ibuprofen 600 mg oral tablet: 1 tab(s) orally every 6 hours (2021 11:38)  lanolin topical ointment: 1 application topically every 6 hours, As needed, Sore Nipples (2021 11:38)  Synthroid 25 mcg (0.025 mg) oral tablet:  (2021 11:05)    SOCIAL HISTORY:  Denies toxic habits x3; feels safe at home     ALLERGIES:  NKDA

## 2021-08-05 NOTE — ED ADULT NURSE NOTE - OBJECTIVE STATEMENT
pt presents to ED s/p pain around  site. Pt had  1 week ago at . Endorses constipation x2 days. also inability to urinate. pt states she urinated a small amount this morning. bladder scan showing 126cc.

## 2021-08-05 NOTE — ED STATDOCS - GASTROINTESTINAL, MLM
abdomen soft, and non-distended. Bowel sounds present. +suprapubic, pelvic TTP, no wound dehiscence, no discharge

## 2021-08-05 NOTE — ED STATDOCS - PROGRESS NOTE DETAILS
Patient seen and evaluated in intake with ED Attending,  ED attending note and orders reviewed, will continue with patient follow up and care -Kati Gongora PA-C spoke to ob team will admit to there service under Dr. Pavon. pt agrees with plan. -Kati Gongora PA-C

## 2021-08-05 NOTE — ED STATDOCS - OBJECTIVE STATEMENT
46 y/o female with a PMHx of back pain presents to the ED c/o abd pain since last night. Has not been able to urinate since last night, unknown exactly when she last urinated. Tried taking Tylenol and oxycodone without improvement. Pt delivered a baby by  4 days ago.

## 2021-08-05 NOTE — H&P ADULT - NSHPLABSRESULTS_GEN_ALL_CORE
10.2   15.79 )-----------( 505      ( 05 Aug 2021 16:49 )             32.4         142  |  108  |  6<L>  ----------------------------<  60<L>  3.3<L>   |  25  |  0.45<L>    Ca    8.9      05 Aug 2021 16:49    TPro  7.5  /  Alb  2.8<L>  /  TBili  0.5  /  DBili  x   /  AST  22  /  ALT  33  /  AlkPhos  104      PT/INR - ( 05 Aug 2021 16:49 )   PT: 12.6 sec;   INR: 1.09 ratio         PTT - ( 05 Aug 2021 16:49 )  PTT:26.6 sec  Lactate, Blood (21 @ 18:47)    Lactate, Blood: 1.1 mmol/L    Urinalysis Basic - ( 05 Aug 2021 18:33 )    Color: Yellow / Appearance: Clear / S.015 / pH: x  Gluc: x / Ketone: Large  / Bili: Negative / Urobili: Negative mg/dL   Blood: x / Protein: 30 mg/dL / Nitrite: Negative   Leuk Esterase: Negative / RBC: 3-5 /HPF / WBC Negative   Sq Epi: x / Non Sq Epi: Few / Bacteria: Occasional    < from: CT Abdomen and Pelvis w/ IV Cont (21 @ 17:30) >    FINDINGS:    LOWER CHEST: Linear atelectasis.    LIVER: Subcentimeter right hepatic lobe hypodensity too small to characterize.  BILE DUCTS: Normal caliber.  GALLBLADDER: Within normal limits.  SPLEEN: Within normal limits.  PANCREAS: Within normal limits.  ADRENALS: Within normal limits.  KIDNEYS/URETERS: Within normal limits.    BLADDER: Within normal limits.  REPRODUCTIVE ORGANS: Status post  section. Enlarged myomatous postpartum uterus.    Anterior to the lower uterine segment above the level of the bladder, a complex thick-walled collection with small bubbles of gas measures 10.5 x 4.7 x 4.1 cm, concerning for abscess.    BOWEL: No bowel obstruction. Appendix is normal  PERITONEUM: Trace free fluid.  VESSELS:  Within normal limits.  RETROPERITONEUM: No lymphadenopathy.  ABDOMINAL WALL: Subcutaneous edema. Small subcutaneous fluid attenuation lesion above the level of the umbilicus measuring 2.1 cm, slightly increased in size compared to prior examination 2020.  BONES: Within normal limits.    IMPRESSION:  Complex collection along the anterior aspect of the lower uterine segment transverse incision measuring 10.5 x 4.7 x 4.1 cm, concerning for abscess.    < end of copied text >

## 2021-08-05 NOTE — H&P ADULT - ASSESSMENT
A/P: 4yo  POD#7 from uncomplicated rCS who is being admitted for sepsis given tachycardia, fever and elevated WBC count likely 2/2 abdominal collection.     Neuro: Pain moderately controlled. Will start Tylenol and Motrin standing, oxycodone PRN and morphine for breakthrough.   CV: No history of cardiovascular disease. Tachycardic, likely secondary to infection. EKG pending. Blood pressure elevated however not within severe range. No thrombocytopenia, LFTs and creatinine WNL. Preeclampsia unlikely at this time. Will continue to monitor.   Pulm: No active disease. Saturating well on room air. Incentive spirometer use encouraged. CXR pending.   GI: No active disease. Bowel sounds and function normal, tolerating PO diet. Continue regular diet. NPO after midnight for possible IR procedure tomorrow.   : Voiding spontaneously. Will place bourgeois cathetar for possible urinary retention and UOP monitoring. UA WNL. UCx pending.   Heme: Hgb 10.2. Plts 505, likely reactive. Will repeat CBC in AM.   ID: WBC 15. Lactate 1.1. Febrile to 100.4F, likely secondary to abdominal collection. Continue zosyn. UCx and blood cultures pending. CXR pending. Repeat lactate and procalcitonin in AM.   Endo: PMH of hypothyroidism, not taking medications.   FEN: IVF at 125cc/hr. Electrolytes WNL. BMP, Mg and Phos in AM.   Skin: No active disease.   Psych: No active disease.   DVT ppx: Ambulation encouraged, SCDs when in bed  Dispo: Admit to GYN service. Will consult IR in AM for possible drainage.

## 2021-08-05 NOTE — H&P ADULT - NSHPPHYSICALEXAM_GEN_ALL_CORE
Vital Signs Last 24 Hrs  T(C): 38 (05 Aug 2021 18:39), Max: 38 (05 Aug 2021 18:39)  T(F): 100.4 (05 Aug 2021 18:39), Max: 100.4 (05 Aug 2021 18:39)  HR: 126 (05 Aug 2021 18:39) (115 - 126)  BP: 141/83 (05 Aug 2021 18:39) (141/83 - 153/82)  BP(mean): 98 (05 Aug 2021 18:39) (98 - 101)  RR: 17 (05 Aug 2021 18:39) (17 - 18)  SpO2: 97% (05 Aug 2021 18:39) (97% - 100%)    General: NAD, well-appearing  Heart: Tachycardic, but regular rhythm   Lungs: CTAB  Abdominal: soft, non-tender, non-distended, no rebound or guarding, +BS  Incision: clean/dry/intact without any signs of infection or hernia   Ext: non-tender, pitting edema on shins bilaterally  Pelvic: Deferred

## 2021-08-06 ENCOUNTER — APPOINTMENT (OUTPATIENT)
Dept: OBGYN | Facility: CLINIC | Age: 46
End: 2021-08-06

## 2021-08-06 LAB
ANION GAP SERPL CALC-SCNC: 10 MMOL/L — SIGNIFICANT CHANGE UP (ref 5–17)
BUN SERPL-MCNC: 6 MG/DL — LOW (ref 7–23)
CALCIUM SERPL-MCNC: 9.1 MG/DL — SIGNIFICANT CHANGE UP (ref 8.5–10.1)
CHLORIDE SERPL-SCNC: 108 MMOL/L — SIGNIFICANT CHANGE UP (ref 96–108)
CO2 SERPL-SCNC: 22 MMOL/L — SIGNIFICANT CHANGE UP (ref 22–31)
COVID-19 SPIKE DOMAIN AB INTERP: NEGATIVE — SIGNIFICANT CHANGE UP
COVID-19 SPIKE DOMAIN ANTIBODY RESULT: 0.4 U/ML — SIGNIFICANT CHANGE UP
CREAT SERPL-MCNC: 0.46 MG/DL — LOW (ref 0.5–1.3)
GLUCOSE SERPL-MCNC: 57 MG/DL — LOW (ref 70–99)
HCT VFR BLD CALC: 29.3 % — LOW (ref 34.5–45)
HGB BLD-MCNC: 9.3 G/DL — LOW (ref 11.5–15.5)
LACTATE SERPL-SCNC: 1 MMOL/L — SIGNIFICANT CHANGE UP (ref 0.7–2)
MAGNESIUM SERPL-MCNC: 2 MG/DL — SIGNIFICANT CHANGE UP (ref 1.6–2.6)
MCHC RBC-ENTMCNC: 26.7 PG — LOW (ref 27–34)
MCHC RBC-ENTMCNC: 31.7 GM/DL — LOW (ref 32–36)
MCV RBC AUTO: 84.2 FL — SIGNIFICANT CHANGE UP (ref 80–100)
PHOSPHATE SERPL-MCNC: 4.5 MG/DL — SIGNIFICANT CHANGE UP (ref 2.5–4.5)
PLATELET # BLD AUTO: 478 K/UL — HIGH (ref 150–400)
POTASSIUM SERPL-MCNC: 3.5 MMOL/L — SIGNIFICANT CHANGE UP (ref 3.5–5.3)
POTASSIUM SERPL-SCNC: 3.5 MMOL/L — SIGNIFICANT CHANGE UP (ref 3.5–5.3)
PROCALCITONIN SERPL-MCNC: 0.56 NG/ML — HIGH (ref 0.02–0.1)
RBC # BLD: 3.48 M/UL — LOW (ref 3.8–5.2)
RBC # FLD: 15.2 % — HIGH (ref 10.3–14.5)
SARS-COV-2 IGG+IGM SERPL QL IA: 0.4 U/ML — SIGNIFICANT CHANGE UP
SARS-COV-2 IGG+IGM SERPL QL IA: NEGATIVE — SIGNIFICANT CHANGE UP
SODIUM SERPL-SCNC: 140 MMOL/L — SIGNIFICANT CHANGE UP (ref 135–145)
WBC # BLD: 18.23 K/UL — HIGH (ref 3.8–10.5)
WBC # FLD AUTO: 18.23 K/UL — HIGH (ref 3.8–10.5)

## 2021-08-06 PROCEDURE — 49406 IMAGE CATH FLUID PERI/RETRO: CPT

## 2021-08-06 RX ORDER — HYDROMORPHONE HYDROCHLORIDE 2 MG/ML
0.5 INJECTION INTRAMUSCULAR; INTRAVENOUS; SUBCUTANEOUS
Refills: 0 | Status: DISCONTINUED | OUTPATIENT
Start: 2021-08-06 | End: 2021-08-06

## 2021-08-06 RX ORDER — FENTANYL CITRATE 50 UG/ML
50 INJECTION INTRAVENOUS
Refills: 0 | Status: DISCONTINUED | OUTPATIENT
Start: 2021-08-06 | End: 2021-08-06

## 2021-08-06 RX ORDER — ENOXAPARIN SODIUM 100 MG/ML
40 INJECTION SUBCUTANEOUS DAILY
Refills: 0 | Status: DISCONTINUED | OUTPATIENT
Start: 2021-08-06 | End: 2021-08-09

## 2021-08-06 RX ORDER — MEPERIDINE HYDROCHLORIDE 50 MG/ML
12.5 INJECTION INTRAMUSCULAR; INTRAVENOUS; SUBCUTANEOUS
Refills: 0 | Status: DISCONTINUED | OUTPATIENT
Start: 2021-08-06 | End: 2021-08-06

## 2021-08-06 RX ORDER — ONDANSETRON 8 MG/1
4 TABLET, FILM COATED ORAL ONCE
Refills: 0 | Status: DISCONTINUED | OUTPATIENT
Start: 2021-08-06 | End: 2021-08-06

## 2021-08-06 RX ORDER — OXYCODONE HYDROCHLORIDE 5 MG/1
10 TABLET ORAL ONCE
Refills: 0 | Status: DISCONTINUED | OUTPATIENT
Start: 2021-08-06 | End: 2021-08-06

## 2021-08-06 RX ORDER — SODIUM CHLORIDE 9 MG/ML
1000 INJECTION, SOLUTION INTRAVENOUS
Refills: 0 | Status: DISCONTINUED | OUTPATIENT
Start: 2021-08-06 | End: 2021-08-06

## 2021-08-06 RX ADMIN — Medication 1 TABLET(S): at 09:03

## 2021-08-06 RX ADMIN — PIPERACILLIN AND TAZOBACTAM 25 GRAM(S): 4; .5 INJECTION, POWDER, LYOPHILIZED, FOR SOLUTION INTRAVENOUS at 18:01

## 2021-08-06 RX ADMIN — Medication 975 MILLIGRAM(S): at 14:02

## 2021-08-06 RX ADMIN — Medication 975 MILLIGRAM(S): at 03:28

## 2021-08-06 RX ADMIN — MORPHINE SULFATE 2 MILLIGRAM(S): 50 CAPSULE, EXTENDED RELEASE ORAL at 17:25

## 2021-08-06 RX ADMIN — PIPERACILLIN AND TAZOBACTAM 25 GRAM(S): 4; .5 INJECTION, POWDER, LYOPHILIZED, FOR SOLUTION INTRAVENOUS at 12:00

## 2021-08-06 RX ADMIN — MORPHINE SULFATE 2 MILLIGRAM(S): 50 CAPSULE, EXTENDED RELEASE ORAL at 16:55

## 2021-08-06 RX ADMIN — Medication 975 MILLIGRAM(S): at 09:30

## 2021-08-06 RX ADMIN — Medication 975 MILLIGRAM(S): at 23:10

## 2021-08-06 RX ADMIN — Medication 975 MILLIGRAM(S): at 00:30

## 2021-08-06 RX ADMIN — Medication 975 MILLIGRAM(S): at 21:58

## 2021-08-06 RX ADMIN — MORPHINE SULFATE 2 MILLIGRAM(S): 50 CAPSULE, EXTENDED RELEASE ORAL at 09:01

## 2021-08-06 RX ADMIN — Medication 975 MILLIGRAM(S): at 09:02

## 2021-08-06 RX ADMIN — MORPHINE SULFATE 2 MILLIGRAM(S): 50 CAPSULE, EXTENDED RELEASE ORAL at 09:30

## 2021-08-06 RX ADMIN — PIPERACILLIN AND TAZOBACTAM 25 GRAM(S): 4; .5 INJECTION, POWDER, LYOPHILIZED, FOR SOLUTION INTRAVENOUS at 03:28

## 2021-08-06 NOTE — BRIEF OPERATIVE NOTE - OPERATION/FINDINGS
1) Pelvic collection accessed under CT guidance with 17G trocar  2) 12F drain placed  3) 40cc serosanguinous fluid removed  4) Drain left to gravity

## 2021-08-06 NOTE — CHART NOTE - NSCHARTNOTEFT_GEN_A_CORE
Pt had an IR drainage procedure.  Discussed with interventional radiologist  40-50cc serosang fluid aspirated  not purulent  drain left in   fluid sent for culture if drainage is minimal will remove drain in 48-72 hours.  will continue antibiotics  md mateo

## 2021-08-06 NOTE — ED ADULT NURSE REASSESSMENT NOTE - NS ED NURSE REASSESS COMMENT FT1
#16 bourgeois catheter inserted to bedside drainage.  250cc of clear yellow urine returned and catheter continues to drain.  Patient reported feeling some relief from the pressure in her abdominal area.
Cueto emptied 650cc.  Sandie CISNEROS called and updated to patient's current status and VS,.
Report given to Sandie.  Patient has pain in left lower quadrant.  Tylenol given for temperature 101.5.

## 2021-08-06 NOTE — CHART NOTE - NSCHARTNOTEFT_GEN_A_CORE
GYNECOLOGIC PROGRESS NOTE    Pt seen and examined at bedside.     SUBJECTIVE:  Patient is without complaints, feeling better.   Pain is well-controlled.  Flatus: Yes   Denies Nausea, Vomiting or Diarrhea.  Denies shortness of breath, chest pain or dyspnea on exertion.  Tolerating PO diet.    OBJECTIVE:     VITALS:  T(F): 98.3 (21 @ 16:39), Max: 101.5 (21 @ 00:58)  HR: 106 (21 @ 16:39) (84 - 123)  BP: 122/68 (21 @ 16:39) (119/66 - 142/87)  RR: 18 (21 @ 16:39) (13 - 25)  SpO2: 100% (21 @ 16:39) (96% - 100%)    I&O's Summary    06 Aug 2021 07:01  -  06 Aug 2021 20:08  --------------------------------------------------------  IN: 150 mL / OUT: 230 mL / NET: -80 mL      MEDICATIONS  (STANDING):  acetaminophen   Tablet .. 975 milliGRAM(s) Oral <User Schedule>  enoxaparin Injectable 40 milliGRAM(s) SubCutaneous daily  ibuprofen  Tablet. 600 milliGRAM(s) Oral every 6 hours  lactated ringers. 1000 milliLiter(s) (125 mL/Hr) IV Continuous <Continuous>  piperacillin/tazobactam IVPB.. 3.375 Gram(s) IV Intermittent every 8 hours  prenatal multivitamin 1 Tablet(s) Oral daily    MEDICATIONS  (PRN):  morphine  - Injectable 2 milliGRAM(s) IV Push every 6 hours PRN Severe Pain (7 - 10)  oxyCODONE    IR 5 milliGRAM(s) Oral every 3 hours PRN Moderate to Severe Pain (4-10)  oxyCODONE    IR 5 milliGRAM(s) Oral once PRN Moderate to Severe Pain (4-10)      Physical Exam:  Constitutional: NAD  Pulmonary: Clear to auscultation bilaterally   Cardiovascular: Regular rate and rhythm   Abdomen: Soft, non-tender, non-distended, normal bowel sounds. IR drain with 5cc of serosanguinous fluid.   Extremities: No lower extremity edema or calve tenderness, Pili's sign negative. SCDs in place.   Incision: C/S incision clean, dry, intact.  Without signs of infection or hernia. Drain site clean, dry and intact.     LABS:                        9.3    18.23 )-----------( 478      ( 06 Aug 2021 08:41 )             29.3     08-06    140  |  108  |  6<L>  ----------------------------<  57<L>  3.5   |  22  |  0.46<L>    Ca    9.1      06 Aug 2021 08:41  Phos  4.5     08-06  Mg     2.0     08-06    TPro  7.5  /  Alb  2.8<L>  /  TBili  0.5  /  DBili  x   /  AST  22  /  ALT  33  /  AlkPhos  104  08-05    PT/INR - ( 05 Aug 2021 16:49 )   PT: 12.6 sec;   INR: 1.09 ratio         PTT - ( 05 Aug 2021 16:49 )  PTT:26.6 sec  Urinalysis Basic - ( 05 Aug 2021 18:33 )    Color: Yellow / Appearance: Clear / S.015 / pH: x  Gluc: x / Ketone: Large  / Bili: Negative / Urobili: Negative mg/dL   Blood: x / Protein: 30 mg/dL / Nitrite: Negative   Leuk Esterase: Negative / RBC: 3-5 /HPF / WBC Negative   Sq Epi: x / Non Sq Epi: Few / Bacteria: Occasional    A/P: 46yo  POD#8 from uncomplicated rCS who is admitted for sepsis given tachycardia, fever and elevated WBC count s/p IR drainage of pelvic collection on 21. HD#2.   Neuro: Pain well controlled. Continue current regimen.   CV: No history of cardiovascular disease. Tachycardic, likely secondary to infection. EKG with sinus tachycardia. Blood pressure well controlled.    Pulm: No active disease. Saturating well on room air. Incentive spirometer use encouraged. CXR showing possible pulmonary venous hypertension. Will follow up as outpatient.   GI: No active disease. Bowel sounds and function normal, tolerating PO diet. Continue regular diet.   : Cueto catheter to be removed, will f/u TOV. UA WNL. UCx pending.   Heme: Hgb 10.2 >9.3, asymptomatic. Plts 505 >478, likely reactive. Will repeat CBC in AM.   ID: WBC 15 >18. Lactate 1.1>1. Last fever 100.4F at 0342. Continue zosyn. UCx and blood cultures pending.   Endo: PMH of hypothyroidism, not taking medications.   FEN: IVF discontinued. Electrolytes WNL. BMP in AM.   Skin: No active disease.   Psych: No active disease.   DVT ppx: Ambulation encouraged, SCDs when in bed  Dispo: Continue inpatient management.

## 2021-08-07 LAB
ANION GAP SERPL CALC-SCNC: 10 MMOL/L — SIGNIFICANT CHANGE UP (ref 5–17)
BUN SERPL-MCNC: 6 MG/DL — LOW (ref 7–23)
CALCIUM SERPL-MCNC: 9.1 MG/DL — SIGNIFICANT CHANGE UP (ref 8.5–10.1)
CHLORIDE SERPL-SCNC: 108 MMOL/L — SIGNIFICANT CHANGE UP (ref 96–108)
CO2 SERPL-SCNC: 21 MMOL/L — LOW (ref 22–31)
CREAT SERPL-MCNC: 0.38 MG/DL — LOW (ref 0.5–1.3)
CULTURE RESULTS: SIGNIFICANT CHANGE UP
GLUCOSE SERPL-MCNC: 54 MG/DL — CRITICAL LOW (ref 70–99)
HCT VFR BLD CALC: 30.3 % — LOW (ref 34.5–45)
HGB BLD-MCNC: 9.5 G/DL — LOW (ref 11.5–15.5)
MCHC RBC-ENTMCNC: 26.2 PG — LOW (ref 27–34)
MCHC RBC-ENTMCNC: 31.4 GM/DL — LOW (ref 32–36)
MCV RBC AUTO: 83.7 FL — SIGNIFICANT CHANGE UP (ref 80–100)
PLATELET # BLD AUTO: 538 K/UL — HIGH (ref 150–400)
POTASSIUM SERPL-MCNC: 3.4 MMOL/L — LOW (ref 3.5–5.3)
POTASSIUM SERPL-SCNC: 3.4 MMOL/L — LOW (ref 3.5–5.3)
RBC # BLD: 3.62 M/UL — LOW (ref 3.8–5.2)
RBC # FLD: 15.1 % — HIGH (ref 10.3–14.5)
SODIUM SERPL-SCNC: 139 MMOL/L — SIGNIFICANT CHANGE UP (ref 135–145)
SPECIMEN SOURCE: SIGNIFICANT CHANGE UP
WBC # BLD: 10.31 K/UL — SIGNIFICANT CHANGE UP (ref 3.8–10.5)
WBC # FLD AUTO: 10.31 K/UL — SIGNIFICANT CHANGE UP (ref 3.8–10.5)

## 2021-08-07 RX ORDER — MEROPENEM 1 G/30ML
1000 INJECTION INTRAVENOUS EVERY 8 HOURS
Refills: 0 | Status: DISCONTINUED | OUTPATIENT
Start: 2021-08-07 | End: 2021-08-07

## 2021-08-07 RX ORDER — IBUPROFEN 200 MG
600 TABLET ORAL EVERY 6 HOURS
Refills: 0 | Status: DISCONTINUED | OUTPATIENT
Start: 2021-08-07 | End: 2021-08-09

## 2021-08-07 RX ORDER — OXYCODONE HYDROCHLORIDE 5 MG/1
5 TABLET ORAL
Refills: 0 | Status: DISCONTINUED | OUTPATIENT
Start: 2021-08-07 | End: 2021-08-09

## 2021-08-07 RX ORDER — LANOLIN ALCOHOL/MO/W.PET/CERES
5 CREAM (GRAM) TOPICAL AT BEDTIME
Refills: 0 | Status: DISCONTINUED | OUTPATIENT
Start: 2021-08-07 | End: 2021-08-09

## 2021-08-07 RX ORDER — OXYCODONE HYDROCHLORIDE 5 MG/1
5 TABLET ORAL ONCE
Refills: 0 | Status: DISCONTINUED | OUTPATIENT
Start: 2021-08-07 | End: 2021-08-09

## 2021-08-07 RX ADMIN — Medication 975 MILLIGRAM(S): at 22:00

## 2021-08-07 RX ADMIN — OXYCODONE HYDROCHLORIDE 5 MILLIGRAM(S): 5 TABLET ORAL at 21:06

## 2021-08-07 RX ADMIN — Medication 975 MILLIGRAM(S): at 15:09

## 2021-08-07 RX ADMIN — PIPERACILLIN AND TAZOBACTAM 25 GRAM(S): 4; .5 INJECTION, POWDER, LYOPHILIZED, FOR SOLUTION INTRAVENOUS at 18:30

## 2021-08-07 RX ADMIN — Medication 5 MILLIGRAM(S): at 23:09

## 2021-08-07 RX ADMIN — Medication 975 MILLIGRAM(S): at 10:52

## 2021-08-07 RX ADMIN — PIPERACILLIN AND TAZOBACTAM 25 GRAM(S): 4; .5 INJECTION, POWDER, LYOPHILIZED, FOR SOLUTION INTRAVENOUS at 10:48

## 2021-08-07 RX ADMIN — OXYCODONE HYDROCHLORIDE 5 MILLIGRAM(S): 5 TABLET ORAL at 20:04

## 2021-08-07 RX ADMIN — Medication 1 TABLET(S): at 10:52

## 2021-08-07 RX ADMIN — Medication 975 MILLIGRAM(S): at 21:06

## 2021-08-07 RX ADMIN — MORPHINE SULFATE 2 MILLIGRAM(S): 50 CAPSULE, EXTENDED RELEASE ORAL at 05:50

## 2021-08-07 RX ADMIN — ENOXAPARIN SODIUM 40 MILLIGRAM(S): 100 INJECTION SUBCUTANEOUS at 10:51

## 2021-08-07 RX ADMIN — PIPERACILLIN AND TAZOBACTAM 25 GRAM(S): 4; .5 INJECTION, POWDER, LYOPHILIZED, FOR SOLUTION INTRAVENOUS at 03:05

## 2021-08-07 RX ADMIN — MORPHINE SULFATE 2 MILLIGRAM(S): 50 CAPSULE, EXTENDED RELEASE ORAL at 05:29

## 2021-08-07 NOTE — PROVIDER CONTACT NOTE (CRITICAL VALUE NOTIFICATION) - BACKGROUND
s/p  1 week ago. admitted for sepsis. patient states she had issues with her blood sugar during pregnancy.

## 2021-08-07 NOTE — CONSULT NOTE ADULT - ASSESSMENT
44 y/o female obesity, hypotlhyroidism,  POD#7 from uncomplicated rCS was admitted pm 8/5 for worsening abdominal pain. Pain is in LLQ and RLQ, sharp, 10/10, constant, mildly alleviated with Tylenol and oxycodone, without exacerbating factors, and associated with fevers and chills. Highest fever at home was 100.9F on . She also endorses difficulty to urinate. Last BM was a couple of days ago, +flatus. Breastfeeding and formula feeding. Workup showed a pelvic fluid collection that was drained by IR. She received zosyn     1. Febrile syndrome. Pelvic collection/ abscess s/p IR drainage. Recent delivery with .   -abdominal pain  -cultures were obtained  -BC x 2  -agree with zosyn 3.375 gm IV q8h  -reason for abx use and side effects reviewed with patient; monitor BMP   -monitor abdomen  -advised that abx is secreted in breast milk and the baby can be exposed to abx from breast milk   -old chart reviewed to assess prior cultures  -monitor temps  -f/u CBC  -supportive care  2. Other issues:   -care per medicine

## 2021-08-07 NOTE — CONSULT NOTE ADULT - SUBJECTIVE AND OBJECTIVE BOX
Patient is a 45y old  Female who presents with a chief complaint of Sepsis     HPI:  46 y/o female hypotlhyroidism,  POD#7 from uncomplicated rCS was admitted pm 8/5 for worsening abdominal pain. Pain is in LLQ and RLQ, sharp, 10/10, constant, mildly alleviated with Tylenol and oxycodone, without exacerbating factors, and associated with fevers and chills. Highest fever at home was 100.9F on . She also endorses difficulty to urinate. Last BM was a couple of days ago, +flatus. Breastfeeding and formula feeding. Workup showed a pelvic fluid collection that was drained by IR. She received zosyn      PAST MEDICAL HISTORY:  Hypothyroidism     PAST SURGICAL HISTORY:  C/S x2    Meds: per reconciliation sheet, noted below  MEDICATIONS  (STANDING):  acetaminophen   Tablet .. 975 milliGRAM(s) Oral <User Schedule>  enoxaparin Injectable 40 milliGRAM(s) SubCutaneous daily  ibuprofen  Tablet. 600 milliGRAM(s) Oral every 6 hours  piperacillin/tazobactam IVPB.. 3.375 Gram(s) IV Intermittent every 8 hours  prenatal multivitamin 1 Tablet(s) Oral daily    MEDICATIONS  (PRN):  morphine  - Injectable 2 milliGRAM(s) IV Push every 6 hours PRN Severe Pain (7 - 10)  oxyCODONE    IR 5 milliGRAM(s) Oral once PRN Moderate to Severe Pain (4-10)  oxyCODONE    IR 5 milliGRAM(s) Oral every 3 hours PRN Moderate to Severe Pain (4-10)  oxyCODONE    IR 5 milliGRAM(s) Oral once PRN Moderate to Severe Pain (4-10)    Allergies    No Known Allergies    Intolerances      Social: no smoking, no alcohol, no illegal drugs; no recent travel, no exposure to TB  FAMILY HISTORY:    no history of premature cardiovascular disease in first degree relatives    ROS: the patient denies fever, no chills, no HA, no seizures, no dizziness, no sore throat, no nasal congestion, no blurry vision, no CP, no palpitations, no SOB, no cough, no abdominal pain, no diarrhea, no N/V, no dysuria, no leg pain, no claudication, no rash, no joint aches, no rectal pain or bleeding, no night sweats  All other systems reviewed and are negative    Vital Signs Last 24 Hrs  T(C): 37.4 (07 Aug 2021 07:14), Max: 37.4 (07 Aug 2021 07:14)  T(F): 99.4 (07 Aug 2021 07:14), Max: 99.4 (07 Aug 2021 07:14)  HR: 113 (07 Aug 2021 07:14) (98 - 113)  BP: 132/90 (07 Aug 2021 07:14) (119/72 - 142/87)  BP(mean): --  RR: 18 (07 Aug 2021 07:14) (13 - 25)  SpO2: 97% (07 Aug 2021 07:14) (96% - 100%)  Daily     Daily     PE:    Constitutional:  No acute distress  HEENT: NC/AT, EOMI, PERRLA, conjunctivae clear; ears and nose atraumatic; pharynx benign  Neck: supple; thyroid not palpable  Back: no tenderness  Respiratory: respiratory effort normal; clear to auscultation  Cardiovascular: S1S2 regular, no murmurs  Abdomen: soft, lower abdominal tender, not distended, positive BS; no liver or spleen organomegaly  Genitourinary: no suprapubic tenderness  Lymphatic: no LN palpable  Musculoskeletal: no muscle tenderness, no joint swelling or tenderness  Extremities: no pedal edema  Neurological/ Psychiatric: AxOx3, judgement and insight normal; moving all extremities  Skin: no rashes; no palpable lesions    Labs: all available labs reviewed                        9.5    10.31 )-----------( 538      ( 07 Aug 2021 08:44 )             30.3     08-07    139  |  108  |  6<L>  ----------------------------<  54<LL>  3.4<L>   |  21<L>  |  0.38<L>    Ca    9.1      07 Aug 2021 08:44  Phos  4.5     08-06  Mg     2.0     08-06    TPro  7.5  /  Alb  2.8<L>  /  TBili  0.5  /  DBili  x   /  AST  22  /  ALT  33  /  AlkPhos  104  08-05     LIVER FUNCTIONS - ( 05 Aug 2021 16:49 )  Alb: 2.8 g/dL / Pro: 7.5 gm/dL / ALK PHOS: 104 U/L / ALT: 33 U/L / AST: 22 U/L / GGT: x           Urinalysis Basic - ( 05 Aug 2021 18:33 )    Color: Yellow / Appearance: Clear / S.015 / pH: x  Gluc: x / Ketone: Large  / Bili: Negative / Urobili: Negative mg/dL   Blood: x / Protein: 30 mg/dL / Nitrite: Negative   Leuk Esterase: Negative / RBC: 3-5 /HPF / WBC Negative   Sq Epi: x / Non Sq Epi: Few / Bacteria: Occasional          COVID-19 PCR: NotDetec (21 @ 18:47)          Radiology: all available radiological tests reviewed    Advanced directives addressed: full resuscitation

## 2021-08-07 NOTE — CHART NOTE - NSCHARTNOTEFT_GEN_A_CORE
VSS T=99.4  pt states she is feeling better  pt had IR drainage yesterday  IR drain draining small amount of serosang fluid clear  abd soft  8cm area of induration in center of pannus ?cellulitis no collection in that area  inc clean dry intact and healing well  no vaginal bleeding or disch  ext nontender no cords  pt started on lovenox  will continue IV zosyn and observe will remove drain in 48 hours if minimal drainage  repeat cbc this am  discussed plan of care with pt    md mateo

## 2021-08-08 LAB
ANION GAP SERPL CALC-SCNC: 3 MMOL/L — LOW (ref 5–17)
BASOPHILS # BLD AUTO: 0.01 K/UL — SIGNIFICANT CHANGE UP (ref 0–0.2)
BASOPHILS NFR BLD AUTO: 0.1 % — SIGNIFICANT CHANGE UP (ref 0–2)
BUN SERPL-MCNC: 6 MG/DL — LOW (ref 7–23)
CALCIUM SERPL-MCNC: 8.8 MG/DL — SIGNIFICANT CHANGE UP (ref 8.5–10.1)
CHLORIDE SERPL-SCNC: 108 MMOL/L — SIGNIFICANT CHANGE UP (ref 96–108)
CO2 SERPL-SCNC: 29 MMOL/L — SIGNIFICANT CHANGE UP (ref 22–31)
CREAT SERPL-MCNC: 0.53 MG/DL — SIGNIFICANT CHANGE UP (ref 0.5–1.3)
EOSINOPHIL # BLD AUTO: 0.14 K/UL — SIGNIFICANT CHANGE UP (ref 0–0.5)
EOSINOPHIL NFR BLD AUTO: 2 % — SIGNIFICANT CHANGE UP (ref 0–6)
GLUCOSE SERPL-MCNC: 119 MG/DL — HIGH (ref 70–99)
HCT VFR BLD CALC: 30.6 % — LOW (ref 34.5–45)
HGB BLD-MCNC: 9.8 G/DL — LOW (ref 11.5–15.5)
IMM GRANULOCYTES NFR BLD AUTO: 0.4 % — SIGNIFICANT CHANGE UP (ref 0–1.5)
LYMPHOCYTES # BLD AUTO: 0.95 K/UL — LOW (ref 1–3.3)
LYMPHOCYTES # BLD AUTO: 13.6 % — SIGNIFICANT CHANGE UP (ref 13–44)
MCHC RBC-ENTMCNC: 26.7 PG — LOW (ref 27–34)
MCHC RBC-ENTMCNC: 32 GM/DL — SIGNIFICANT CHANGE UP (ref 32–36)
MCV RBC AUTO: 83.4 FL — SIGNIFICANT CHANGE UP (ref 80–100)
MONOCYTES # BLD AUTO: 0.54 K/UL — SIGNIFICANT CHANGE UP (ref 0–0.9)
MONOCYTES NFR BLD AUTO: 7.7 % — SIGNIFICANT CHANGE UP (ref 2–14)
NEUTROPHILS # BLD AUTO: 5.33 K/UL — SIGNIFICANT CHANGE UP (ref 1.8–7.4)
NEUTROPHILS NFR BLD AUTO: 76.2 % — SIGNIFICANT CHANGE UP (ref 43–77)
PLATELET # BLD AUTO: 576 K/UL — HIGH (ref 150–400)
POTASSIUM SERPL-MCNC: 3.2 MMOL/L — LOW (ref 3.5–5.3)
POTASSIUM SERPL-SCNC: 3.2 MMOL/L — LOW (ref 3.5–5.3)
RBC # BLD: 3.67 M/UL — LOW (ref 3.8–5.2)
RBC # FLD: 15.3 % — HIGH (ref 10.3–14.5)
SODIUM SERPL-SCNC: 140 MMOL/L — SIGNIFICANT CHANGE UP (ref 135–145)
WBC # BLD: 7 K/UL — SIGNIFICANT CHANGE UP (ref 3.8–10.5)
WBC # FLD AUTO: 7 K/UL — SIGNIFICANT CHANGE UP (ref 3.8–10.5)

## 2021-08-08 RX ORDER — POTASSIUM CHLORIDE 20 MEQ
40 PACKET (EA) ORAL
Refills: 0 | Status: COMPLETED | OUTPATIENT
Start: 2021-08-08 | End: 2021-08-08

## 2021-08-08 RX ADMIN — Medication 600 MILLIGRAM(S): at 09:58

## 2021-08-08 RX ADMIN — Medication 975 MILLIGRAM(S): at 21:42

## 2021-08-08 RX ADMIN — Medication 975 MILLIGRAM(S): at 08:19

## 2021-08-08 RX ADMIN — Medication 40 MILLIEQUIVALENT(S): at 18:18

## 2021-08-08 RX ADMIN — Medication 600 MILLIGRAM(S): at 18:18

## 2021-08-08 RX ADMIN — ENOXAPARIN SODIUM 40 MILLIGRAM(S): 100 INJECTION SUBCUTANEOUS at 08:20

## 2021-08-08 RX ADMIN — PIPERACILLIN AND TAZOBACTAM 25 GRAM(S): 4; .5 INJECTION, POWDER, LYOPHILIZED, FOR SOLUTION INTRAVENOUS at 03:15

## 2021-08-08 RX ADMIN — Medication 600 MILLIGRAM(S): at 10:30

## 2021-08-08 RX ADMIN — PIPERACILLIN AND TAZOBACTAM 25 GRAM(S): 4; .5 INJECTION, POWDER, LYOPHILIZED, FOR SOLUTION INTRAVENOUS at 18:19

## 2021-08-08 RX ADMIN — Medication 40 MILLIEQUIVALENT(S): at 21:43

## 2021-08-08 RX ADMIN — OXYCODONE HYDROCHLORIDE 5 MILLIGRAM(S): 5 TABLET ORAL at 02:20

## 2021-08-08 RX ADMIN — Medication 975 MILLIGRAM(S): at 14:21

## 2021-08-08 RX ADMIN — Medication 975 MILLIGRAM(S): at 22:30

## 2021-08-08 RX ADMIN — PIPERACILLIN AND TAZOBACTAM 25 GRAM(S): 4; .5 INJECTION, POWDER, LYOPHILIZED, FOR SOLUTION INTRAVENOUS at 10:54

## 2021-08-08 RX ADMIN — Medication 1 TABLET(S): at 08:20

## 2021-08-09 ENCOUNTER — TRANSCRIPTION ENCOUNTER (OUTPATIENT)
Age: 46
End: 2021-08-09

## 2021-08-09 VITALS
TEMPERATURE: 98 F | RESPIRATION RATE: 17 BRPM | HEART RATE: 93 BPM | OXYGEN SATURATION: 100 % | DIASTOLIC BLOOD PRESSURE: 63 MMHG | SYSTOLIC BLOOD PRESSURE: 124 MMHG

## 2021-08-09 RX ORDER — LANOLIN ALCOHOL/MO/W.PET/CERES
1 CREAM (GRAM) TOPICAL
Qty: 0 | Refills: 0 | DISCHARGE
Start: 2021-08-09

## 2021-08-09 RX ADMIN — PIPERACILLIN AND TAZOBACTAM 25 GRAM(S): 4; .5 INJECTION, POWDER, LYOPHILIZED, FOR SOLUTION INTRAVENOUS at 03:43

## 2021-08-09 RX ADMIN — Medication 975 MILLIGRAM(S): at 17:31

## 2021-08-09 RX ADMIN — Medication 975 MILLIGRAM(S): at 08:26

## 2021-08-09 RX ADMIN — Medication 600 MILLIGRAM(S): at 04:37

## 2021-08-09 RX ADMIN — Medication 600 MILLIGRAM(S): at 03:43

## 2021-08-09 RX ADMIN — Medication 1 TABLET(S): at 08:26

## 2021-08-09 NOTE — DISCHARGE NOTE PROVIDER - NSDCHHNEEDSERVICEOTHER_GEN_ALL_CORE_FT
IR drain care IR drain care. Will need to record output every day and change dressing every 2-3 days with 4x4 gauze and Tegaderm IR drain care. Will need to record output every day and change dressing every 2-3 days with 4x4 gauze and Tegaderm. Flush drain 10 cc once a day.

## 2021-08-09 NOTE — CHART NOTE - NSCHARTNOTEFT_GEN_A_CORE
VSS afeb  pt feeling much improved    drainage culture pos for enterococcus   pt has been on zosyn - good coverage  sens to augmentin  induration in pannus softer and nontender  inc CDI healing well  nl lochia  ext nontender no cords  WBC=7.0  hct stable at 30%  IR drain=minimal serosang drainage  will have IR remove drain  discharge pt home on po Augmenting 875mg BID for 10 days  f/u in office 5days   instructions and precautions reviewed with  pt   pt understands      md mateo

## 2021-08-09 NOTE — DISCHARGE NOTE NURSING/CASE MANAGEMENT/SOCIAL WORK - NSDCVIVACCINE_GEN_ALL_CORE_FT
COVID-19, mRNA, LNP-S, PF, 100 mcg/ 0.5 mL dose ([employer]); 31-Jul-2021 20:13; Kenisha Llamas (RN); [employer] 048G15H (Exp. Date: 10-Dayo-2021); IntraMuscular; Deltoid Right.; 0.5 milliLiter(s);

## 2021-08-09 NOTE — PROGRESS NOTE ADULT - SUBJECTIVE AND OBJECTIVE BOX
Date of service: 21 @ 13:33    pt seen and examined  minimal drainage from abd drain  denies abd pain  afebrile    ROS: no fever or chills; denies dizziness, no HA, no SOB or cough, no diarrhea or constipation; no dysuria, no urinary frequency, no legs pain, no rashes    MEDICATIONS  (STANDING):  acetaminophen   Tablet .. 975 milliGRAM(s) Oral <User Schedule>  enoxaparin Injectable 40 milliGRAM(s) SubCutaneous daily  ibuprofen  Tablet. 600 milliGRAM(s) Oral every 6 hours  piperacillin/tazobactam IVPB.. 3.375 Gram(s) IV Intermittent every 8 hours  prenatal multivitamin 1 Tablet(s) Oral daily    Vital Signs Last 24 Hrs  T(C): 36.8 (09 Aug 2021 07:58), Max: 36.8 (08 Aug 2021 15:00)  T(F): 98.2 (09 Aug 2021 07:58), Max: 98.2 (08 Aug 2021 15:00)  HR: 98 (09 Aug 2021 07:58) (89 - 101)  BP: 101/72 (09 Aug 2021 07:58) (101/72 - 126/83)  BP(mean): 93 (08 Aug 2021 23:33) (93 - 93)  RR: 18 (09 Aug 2021 07:58) (18 - 18)  SpO2: 100% (09 Aug 2021 07:58) (99% - 100%)      PE:  Constitutional:  No acute distress  HEENT: NC/AT, EOMI, PERRLA, conjunctivae clear; ears and nose atraumatic; pharynx benign  Neck: supple; thyroid not palpable  Back: no tenderness  Respiratory: respiratory effort normal; clear to auscultation  Cardiovascular: S1S2 regular, no murmurs  Abdomen: soft, lower abdominal tender, not distended, positive BS; no liver or spleen organomegaly  Genitourinary: no suprapubic tenderness  Lymphatic: no LN palpable  Musculoskeletal: no muscle tenderness, no joint swelling or tenderness  Extremities: no pedal edema  Neurological/ Psychiatric: AxOx3, judgement and insight normal; moving all extremities  Skin: no rashes; no palpable lesions    Labs: all available labs reviewed                                   9.8    7.00  )-----------( 576      ( 08 Aug 2021 10:10 )             30.6     08-08    140  |  108  |  6<L>  ----------------------------<  119<H>  3.2<L>   |  29  |  0.53    Ca    8.8      08 Aug 2021 10:10        Urinalysis Basic - ( 05 Aug 2021 18:33 )    Color: Yellow / Appearance: Clear / S.015 / pH: x  Gluc: x / Ketone: Large  / Bili: Negative / Urobili: Negative mg/dL   Blood: x / Protein: 30 mg/dL / Nitrite: Negative   Leuk Esterase: Negative / RBC: 3-5 /HPF / WBC Negative   Sq Epi: x / Non Sq Epi: Few / Bacteria: Occasional    Culture - Abscess with Gram Stain (21 @ 15:15)   - Ampicillin: S <=2 Predicts results to ampicillin/sulbactam, amoxacillin-clavulanate and piperacillin-tazobactam.   - Tetra/Doxy: R >8   - Vancomycin: S 2   Specimen Source: .Abscess pelvic abscess drainage   Culture Results:   Numerous Enterococcus faecalis   Organism Identification: Enterococcus faecalis   Organism: Enterococcus faecalis   Method Type: JOÃO Culture - Blood (21 @ 18:47)   Specimen Source: .Blood None   Culture Results:   No growth to date.       COVID-19 PCR: NotDetec (21 @ 18:47)          Radiology: all available radiological tests reviewed    Advanced directives addressed: full resuscitation
VSS afeb   pt 1 week post c/sec  pt admitted for fever and abd pain  pt noted to have 10cm collection anterior to lower uterine segement  pt had code sepsis called yesterday and started on protocol  pt on IV zosyn  cultures pending  lactate borderline elevated   wbc=15.9  abd-soft nontender  induration noted in pannus above incision  inc=clean, dry , intact and healing well  ext-nontender no cords  Imp- post c/sec infection and collection  will cont IV zosyn pending culture results    will get IR consult today for consideration of IR drainage of collection    situation and plan of care discussed with pt  risks,benefits and alternatives discussed with pt in detail  repeat labs this AM    md mateo
ANGUS SANCHEZ is a 44yo  POD#10 from uncomplicated rCS who is admitted for sepsis given tachycardia, fever and elevated WBC count s/p IR drainage of pelvic collection on 21. HD#4    S:    Patient was seen and examined at bedside.   Reports continued lower abdominal pain overnight that is moderately relieved with PRN pain meds.  Otherwise, Tolerating regular diet, denies N/V.   + flatus/-BM.  Ambulating without difficulty. Breast-pumping at bedside  Denies any fevers, chills, CP or SOB overnight    O:   Vital Signs Last 24 Hrs  T(C): 37.7 (08 Aug 2021 07:50), Max: 37.7 (08 Aug 2021 07:50)  T(F): 99.8 (08 Aug 2021 07:50), Max: 99.8 (08 Aug 2021 07:50)  HR: 104 (08 Aug 2021 07:50) (102 - 108)  BP: 123/60 (08 Aug 2021 07:50) (121/66 - 135/83)  RR: 18 (08 Aug 2021 07:50) (18 - 18)  SpO2: 98% (08 Aug 2021 07:50) (98% - 100%)    PE:  CV: RRR  RESP: CTA B/L  Abdomen: , + BS, Soft, Appropriately tender, Negative rebound or guarding appreciated. Tender to palpation above the incision, +warmth, +redness, +tenderness  Incision: clean dry and intact  Extrem: no calf tenderness or edema     21 @ 07:01  -  21 @ 07:00  --------------------------------------------------------  IN: 150 mL / OUT: 890 mL / NET: -740 mL        Labs:                           9.3    18.23 )-----------( 478      ( 06 Aug 2021 08:41 )             29.3                             9.8    7.00  )-----------( 576      ( 08 Aug 2021 10:10 )             30.6   
ANGUS SANCHEZ is a 46yo  POD#9 from uncomplicated rCS who is admitted for sepsis given tachycardia, fever and elevated WBC count s/p IR drainage of pelvic collection on 21. HD#3.     S:    Patient was seen and examined at bedside.   Reports continued lower abdominal pain overnight that is moderately relieved with PRN pain meds.  States morphine was making her nauseous, and would like to try oxycodone for pain relief.  Otherwise, Tolerating regular diet, denies N/V.   + flatus/-BM.  Ambulating without difficulty. Breast-pumping at bedside  Denies any fevers, chills, CP or SOB overnight    O:   T(C): 37.1 (21 @ 23:55), Max: 37.1 (21 @ 16:15)  HR: 107 (21 @ 23:55) (98 - 107)  BP: 134/76 (21 @ 23:55) (119/66 - 142/87)  RR: 18 (21 @ 23:55) (13 - 25)  SpO2: 98% (21 @ 23:55) (96% - 100%)    PE:  CV: RRR  RESP: CTA B/L  Abdomen: , + BS, Soft, Appropriately tender, Negative rebound or guarding appreciated  Incision: clean dry and intact  Extrem: no calf tenderness or edema     21 @ 07:01  -  21 @ 07:00  --------------------------------------------------------  IN: 150 mL / OUT: 890 mL / NET: -740 mL        Labs:                           9.3    18.23 )-----------( 478      ( 06 Aug 2021 08:41 )             29.3

## 2021-08-09 NOTE — DISCHARGE NOTE PROVIDER - NSDCCPTREATMENT_GEN_ALL_CORE_FT
PRINCIPAL PROCEDURE  Procedure: Blood culture in patient on antibiotics  Findings and Treatment:

## 2021-08-09 NOTE — DISCHARGE NOTE PROVIDER - NSDCFUADDAPPT_GEN_ALL_CORE_FT
Please call [phone] to schedule an appointment in 7 days to have follow up for the IR drain removal.

## 2021-08-09 NOTE — DISCHARGE NOTE NURSING/CASE MANAGEMENT/SOCIAL WORK - PATIENT PORTAL LINK FT
You can access the FollowMyHealth Patient Portal offered by [facility] by registering at the following website: [email or web address]. By joining [employer]’s FollowMyHealth portal, you will also be able to view your health information using other applications (apps) compatible with our system.

## 2021-08-09 NOTE — DISCHARGE NOTE PROVIDER - CARE PROVIDER_API CALL
Frank Pavon)  Obstetrics and Gynecology  [address] [place] [address]  Phone: [phone]  Fax: [phone]  Established Patient  Follow Up Time:    Frank Pavon)  Obstetrics and Gynecology  [address] [place] [address]  Phone: [phone]  Fax: [phone]  Established Patient  Follow Up Time:     Gonzalo Jolly)  Radiology  [address] [place] [address]  Phone: [phone]  Fax: [phone]  Follow Up Time: 1 week

## 2021-08-09 NOTE — DISCHARGE NOTE PROVIDER - HOSPITAL COURSE
pt admitted for sepsis  found to have 10cm collection drained by IR  cult pos for enterococcus  tx'd with zosyn  pt responded well to tx  WBC now 7  was 18.9   pt had tmax of 100.4  now afeb for 72hours

## 2021-08-09 NOTE — PROGRESS NOTE ADULT - ASSESSMENT
Patient is a 44yo  POD#9 from uncomplicated rCS who is admitted for sepsis given tachycardia, fever and elevated WBC count s/p IR drainage of pelvic collection on 21. HD#3.     PLAN:  Neuro: Pain well controlled. Will add oxycodone to standing pain regimen. Dilaudid PRN for breakthrough pain  CV: No history of cardiovascular disease. Tachycardic, likely secondary to infection. EKG with sinus tachycardia. Blood pressure well controlled.    Pulm: No active disease. Saturating well on room air. Incentive spirometer use encouraged. CXR showing possible pulmonary venous hypertension. Will follow up as outpatient.   GI: No active disease. Bowel sounds and function normal, tolerating PO diet. Continue regular diet.   : Voiding spontaneously. UA WNL. UCx pending.   Heme: Hgb 10.2 >9.3> F/U AM labs. Plts 505 >478, likely reactive. Will repeat CBC in AM.   ID: WBC 15 >18. Lactate 1.1>1. Last fever 100.4F on  @ 0342. Continue zosyn. ID consult placed. UCx and blood cultures pending.   Endo: PMH of hypothyroidism, not taking medications.   FEN: IVF discontinued. Electrolytes WNL. BMP in AM.   Skin: No active disease.   Psych: No active disease.   DVT ppx: Ambulation encouraged, SCDs when in bed  Dispo: Continue inpatient management.  
46 y/o female obesity, hypotlhyroidism,  POD#7 from uncomplicated rCS was admitted pm 8/5 for worsening abdominal pain. Pain is in LLQ and RLQ, sharp, 10/10, constant, mildly alleviated with Tylenol and oxycodone, without exacerbating factors, and associated with fevers and chills. Highest fever at home was 100.9F on . She also endorses difficulty to urinate. Last BM was a couple of days ago, +flatus. Breastfeeding and formula feeding. Workup showed a pelvic fluid collection that was drained by IR. She received zosyn     1. Febrile syndrome. Pelvic collection/ abscess s/p IR drainage. Recent delivery with .   -improving   -aspirate cx growing E faecalis  -blood cx no growth  -on zosyn 3.375 gm IV q8h #-4  -continue with antibiotic coverage  -reason for abx use and side effects reviewed with patient; monitor BMP   -old chart reviewed to assess prior cultures  -on dc plan for po augmentin [id number] - 14 day course  -IR follow up for drain care/removal  -monitor temps  -f/u CBC  -supportive care  2. Other issues:   -care per medicine  
Patient is a 46yo  POD#10 from uncomplicated rCS who is admitted for sepsis given tachycardia, fever and elevated WBC count s/p IR drainage of pelvic collection on 21. HD#4     PLAN:  Neuro: Pain well controlled. Will add oxycodone to standing pain regimen. Dilaudid PRN for breakthrough pain  CV: No history of cardiovascular disease. Tachycardic, likely secondary to infection. EKG with sinus tachycardia. Blood pressure well controlled.    Pulm: No active disease. Saturating well on room air. Incentive spirometer use encouraged. CXR showing possible pulmonary venous hypertension. Will follow up as outpatient.   GI: No active disease. Bowel sounds and function normal, tolerating PO diet. Continue regular diet.   : Voiding spontaneously. UA WNL. UCx pending.   Heme: Hgb 10.2 >9.3> F/U AM labs. Plts 505 >478, likely reactive.  ID: WBC  downtrending 15 >18 > 7. Lactate 1.1>1. Last fever 100.4F on  @ 0342. Continue zosyn. ID consult placed. UCx and blood cultures pending.   Endo: PMH of hypothyroidism, not taking medications.   FEN: IVF discontinued. Electrolytes WNL. BMP in AM.   Skin: No active disease.   Psych: No active disease.   DVT ppx: Ambulation encouraged, SCDs when in bed  Dispo: Continue inpatient management.

## 2021-08-09 NOTE — DISCHARGE NOTE PROVIDER - PROVIDER TOKENS
PROVIDER:[TOKEN:[[id number]],ESTABLISHEDPATIENT:[T]] PROVIDER:[TOKEN:[[id number]],ESTABLISHEDPATIENT:[T]],PROVIDER:[TOKEN:[[id number]],FOLLOWUP:[1 week]]

## 2021-08-09 NOTE — DISCHARGE NOTE PROVIDER - NSDCMRMEDTOKEN_GEN_ALL_CORE_FT
acetaminophen 325 mg oral tablet: 3 tab(s) orally   ibuprofen 600 mg oral tablet: 1 tab(s) orally every 6 hours  melatonin 5 mg oral tablet: 1 tab(s) orally once a day (at bedtime), As needed, Insomnia  Prenatal Multivitamins with Folic Acid 1 mg oral tablet: 1 tab(s) orally once a day  Synthroid 25 mcg (0.025 mg) oral tablet:    acetaminophen 325 mg oral tablet: 3 tab(s) orally   Augmentin 875 mg-125 mg oral tablet: 1 tab(s) orally 2 times a day MDD:2 tablets  ibuprofen 600 mg oral tablet: 1 tab(s) orally every 6 hours  melatonin 5 mg oral tablet: 1 tab(s) orally once a day (at bedtime), As needed, Insomnia  Prenatal Multivitamins with Folic Acid 1 mg oral tablet: 1 tab(s) orally once a day  Synthroid 25 mcg (0.025 mg) oral tablet:

## 2021-08-09 NOTE — DISCHARGE NOTE PROVIDER - NSDCCPCAREPLAN_GEN_ALL_CORE_FT
PRINCIPAL DISCHARGE DIAGNOSIS  Diagnosis: Sepsis after obstetrical procedure  Assessment and Plan of Treatment:

## 2021-08-09 NOTE — DISCHARGE NOTE NURSING/CASE MANAGEMENT/SOCIAL WORK - NSDCFUADDAPPT_GEN_ALL_CORE_FT
Please call [phone] to schedule an appointment in 7 days to have follow up for the IR drain removal.

## 2021-08-10 NOTE — H&P ADULT - [facility]
"-diagnostic tests and consults completed and resulted: Not met, patient admitted to inpatient.    -vital signs normal or at patient baseline: /64 (BP Location: Right arm)   Pulse 91   Temp 99.8  F (37.7  C) (Oral)   Resp 18   Ht 1.575 m (5' 2\")   Wt 69.6 kg (153 lb 8 oz)   SpO2 98%   BMI 28.08 kg/m      -tolerating oral intake to maintain hydration: met    -adequate pain control on oral analgesics: met, tylenol given with a result of decrease in pain    -tolerating oral antibiotics or has plans for home infusion setup: not met      " no murmur/regular rate and rhythm

## 2021-08-11 LAB
CULTURE RESULTS: SIGNIFICANT CHANGE UP
CULTURE RESULTS: SIGNIFICANT CHANGE UP
SPECIMEN SOURCE: SIGNIFICANT CHANGE UP
SPECIMEN SOURCE: SIGNIFICANT CHANGE UP

## 2021-08-14 ENCOUNTER — APPOINTMENT (OUTPATIENT)
Dept: DISASTER EMERGENCY | Facility: CLINIC | Age: 46
End: 2021-08-14

## 2021-08-15 LAB — SARS-COV-2 N GENE NPH QL NAA+PROBE: NOT DETECTED

## 2021-08-16 ENCOUNTER — FORM ENCOUNTER (OUTPATIENT)
Age: 46
End: 2021-08-16

## 2021-08-16 ENCOUNTER — APPOINTMENT (OUTPATIENT)
Dept: OBGYN | Facility: CLINIC | Age: 46
End: 2021-08-16
Payer: COMMERCIAL

## 2021-08-16 PROCEDURE — 99213 OFFICE O/P EST LOW 20 MIN: CPT | Mod: 24

## 2021-08-16 NOTE — HISTORY OF PRESENT ILLNESS
[Postpartum Follow Up] : postpartum follow up [Complications:___] : no complications [Repeat C/S] : delivered by  section (repeat) [Breastfeeding] : not currently nursing [S/Sx PP Depression] : no signs/symptoms of postpartum depression [Clean/Dry/Intact] : clean, dry and intact [Erythema] : not erythematous [Back to Normal] : is back to normal in size [Mild] : mild vaginal bleeding [Normal] : the vagina was normal [Cervix Sample Taken] : cervical sample not taken for a Pap smear [Not Done] : Examination of breasts not done [Doing Well] : is doing well [No Sign of Infection] : is showing no signs of infection [Excellent Pain Control] : has excellent pain control [None] : None [de-identified] : atient is doing well status post treatment for positive culture Contreras drain fluid which grew outenterococcus.Patient is onAugmentin Patient afebrile and doing well. IR drain to be removed tomorrow [de-identified] : incision is clean dry and intact and healing well, IR draindraining small amounts of clear serosanguineous fluid,abdominal wall induration is decreased and nontender. [de-identified] : ollowup in one week

## 2021-08-17 ENCOUNTER — OUTPATIENT (OUTPATIENT)
Dept: OUTPATIENT SERVICES | Facility: HOSPITAL | Age: 46
LOS: 1 days | Discharge: HOME | End: 2021-08-17
Payer: COMMERCIAL

## 2021-08-17 ENCOUNTER — RX RENEWAL (OUTPATIENT)
Age: 46
End: 2021-08-17

## 2021-08-17 ENCOUNTER — RESULT REVIEW (OUTPATIENT)
Age: 46
End: 2021-08-17

## 2021-08-17 DIAGNOSIS — R19.00 INTRA-ABDOMINAL AND PELVIC SWELLING, MASS AND LUMP, UNSPECIFIED SITE: ICD-10-CM

## 2021-08-17 DIAGNOSIS — O34.219 MATERNAL CARE FOR UNSPECIFIED TYPE SCAR FROM PREVIOUS CESAREAN DELIVERY: Chronic | ICD-10-CM

## 2021-08-17 PROCEDURE — 76080 X-RAY EXAM OF FISTULA: CPT | Mod: 26

## 2021-08-17 PROCEDURE — 76080 X-RAY EXAM OF FISTULA: CPT

## 2021-08-17 PROCEDURE — 49424 ASSESS CYST CONTRAST INJECT: CPT

## 2021-08-19 DIAGNOSIS — T40.2X5A ADVERSE EFFECT OF OTHER OPIOIDS, INITIAL ENCOUNTER: ICD-10-CM

## 2021-08-19 DIAGNOSIS — Z79.899 OTHER LONG TERM (CURRENT) DRUG THERAPY: ICD-10-CM

## 2021-08-19 DIAGNOSIS — E03.9 HYPOTHYROIDISM, UNSPECIFIED: ICD-10-CM

## 2021-08-19 DIAGNOSIS — A41.81 SEPSIS DUE TO ENTEROCOCCUS: ICD-10-CM

## 2021-08-19 DIAGNOSIS — R11.0 NAUSEA: ICD-10-CM

## 2021-08-19 DIAGNOSIS — R33.9 RETENTION OF URINE, UNSPECIFIED: ICD-10-CM

## 2021-08-23 ENCOUNTER — APPOINTMENT (OUTPATIENT)
Dept: OBGYN | Facility: CLINIC | Age: 46
End: 2021-08-23
Payer: COMMERCIAL

## 2021-08-23 VITALS
SYSTOLIC BLOOD PRESSURE: 110 MMHG | RESPIRATION RATE: 16 BRPM | HEART RATE: 72 BPM | TEMPERATURE: 97.8 F | OXYGEN SATURATION: 98 % | DIASTOLIC BLOOD PRESSURE: 70 MMHG

## 2021-08-23 VITALS — WEIGHT: 211 LBS | BODY MASS INDEX: 38.83 KG/M2 | HEIGHT: 62 IN

## 2021-08-23 PROCEDURE — 0503F POSTPARTUM CARE VISIT: CPT

## 2021-08-23 NOTE — HISTORY OF PRESENT ILLNESS
[Postpartum Follow Up] : postpartum follow up [Complications:___] : no complications [Primary C/S] : delivered by  section [Breastfeeding] : not currently nursing [S/Sx PP Depression] : no signs/symptoms of postpartum depression [Clean/Dry/Intact] : clean, dry and intact [Erythema] : not erythematous [Healed] : healed [Back to Normal] : is back to normal in size [Normal] : the vagina was normal [Cervix Sample Taken] : cervical sample not taken for a Pap smear [Not Done] : Examination of breasts not done [Doing Well] : is doing well [No Sign of Infection] : is showing no signs of infection [Excellent Pain Control] : has excellent pain control [None] : None [FreeTextEntry8] : patient states she feelsmuch improved in the abdominal area however patient states that she is having bloating with heat in and is making it difficult for her to keep properly. This was discussed with the patient in detail. Patient to have a abdominaland pelvic CAT scanand to followup with gastroenterology Patient has no fevers no diarrhea [de-identified] : patient had repeat  section and bilateral salpingectomy for sterilization [de-identified] : patient has bloating [de-identified] : bdomen is soft nontender. Bowel sounds.The area of induration in the patient's pannus is soft there and last pronounced. The incision is clean dry and intact and well healed [de-identified] : patient advised to have an abdominal pelvic CAT scan and follow up with gastroenterology, referrals given. Instructions and precautions reviewed patient to follow up in 4-5 weeks

## 2021-08-27 ENCOUNTER — APPOINTMENT (OUTPATIENT)
Age: 46
End: 2021-08-27

## 2021-08-31 ENCOUNTER — APPOINTMENT (OUTPATIENT)
Dept: INTERNAL MEDICINE | Facility: CLINIC | Age: 46
End: 2021-08-31

## 2021-09-09 ENCOUNTER — APPOINTMENT (OUTPATIENT)
Dept: INTERNAL MEDICINE | Facility: CLINIC | Age: 46
End: 2021-09-09
Payer: COMMERCIAL

## 2021-09-09 ENCOUNTER — RESULT REVIEW (OUTPATIENT)
Age: 46
End: 2021-09-09

## 2021-09-09 VITALS
TEMPERATURE: 97.9 F | RESPIRATION RATE: 16 BRPM | SYSTOLIC BLOOD PRESSURE: 132 MMHG | DIASTOLIC BLOOD PRESSURE: 84 MMHG | HEIGHT: 61 IN | OXYGEN SATURATION: 98 % | BODY MASS INDEX: 38.71 KG/M2 | HEART RATE: 100 BPM | WEIGHT: 205 LBS

## 2021-09-09 DIAGNOSIS — Z12.39 ENCOUNTER FOR OTHER SCREENING FOR MALIGNANT NEOPLASM OF BREAST: ICD-10-CM

## 2021-09-09 PROCEDURE — 96127 BRIEF EMOTIONAL/BEHAV ASSMT: CPT

## 2021-09-09 PROCEDURE — 99214 OFFICE O/P EST MOD 30 MIN: CPT | Mod: 25

## 2021-09-09 PROCEDURE — 36415 COLL VENOUS BLD VENIPUNCTURE: CPT

## 2021-09-09 RX ORDER — TRANSPARENT DRESSING 6" X 8"
BANDAGE TOPICAL
Qty: 10 | Refills: 0 | Status: DISCONTINUED | COMMUNITY
Start: 2021-08-17 | End: 2021-09-09

## 2021-09-09 RX ORDER — GAUZE BANDAGE 4" X 4"
SPONGE TOPICAL
Qty: 30 | Refills: 0 | Status: DISCONTINUED | COMMUNITY
Start: 2021-08-17 | End: 2021-09-09

## 2021-09-09 RX ORDER — LEVOTHYROXINE SODIUM 0.03 MG/1
25 TABLET ORAL DAILY
Qty: 90 | Refills: 3 | Status: DISCONTINUED | COMMUNITY
Start: 2020-12-15 | End: 2021-09-09

## 2021-09-09 RX ORDER — BLOOD SUGAR DIAGNOSTIC
STRIP MISCELLANEOUS
Qty: 150 | Refills: 3 | Status: DISCONTINUED | COMMUNITY
Start: 2021-04-08 | End: 2021-09-09

## 2021-09-09 RX ORDER — AMOXICILLIN AND CLAVULANATE POTASSIUM 875; 125 MG/1; MG/1
875-125 TABLET, COATED ORAL
Qty: 20 | Refills: 0 | Status: DISCONTINUED | COMMUNITY
Start: 2021-08-09 | End: 2021-09-09

## 2021-09-09 RX ORDER — ALCOHOL ANTISEPTIC PADS
PADS, MEDICATED (EA) TOPICAL
Qty: 150 | Refills: 3 | Status: DISCONTINUED | COMMUNITY
Start: 2021-04-08 | End: 2021-09-09

## 2021-09-09 RX ORDER — BLOOD-GLUCOSE METER
W/DEVICE KIT MISCELLANEOUS
Qty: 1 | Refills: 0 | Status: DISCONTINUED | COMMUNITY
Start: 2021-04-08 | End: 2021-09-09

## 2021-09-09 RX ORDER — OXYCODONE AND ACETAMINOPHEN 5; 325 MG/1; MG/1
5-325 TABLET ORAL
Qty: 15 | Refills: 0 | Status: DISCONTINUED | COMMUNITY
Start: 2021-08-02 | End: 2021-09-09

## 2021-09-09 NOTE — PHYSICAL EXAM
[Well-Appearing] : well-appearing [Normal Voice/Communication] : normal voice/communication [Normal Sclera/Conjunctiva] : normal sclera/conjunctiva [PERRL] : pupils equal round and reactive to light [Normal Oropharynx] : the oropharynx was normal [No JVD] : no jugular venous distention [No Lymphadenopathy] : no lymphadenopathy [Supple] : supple [Thyroid Normal, No Nodules] : the thyroid was normal and there were no nodules present [No Respiratory Distress] : no respiratory distress  [No Accessory Muscle Use] : no accessory muscle use [Clear to Auscultation] : lungs were clear to auscultation bilaterally [Normal Rate] : normal rate  [Regular Rhythm] : with a regular rhythm [Normal S1, S2] : normal S1 and S2 [No Murmur] : no murmur heard [No Extremity Clubbing/Cyanosis] : no extremity clubbing/cyanosis [No Edema] : there was no peripheral edema [Soft] : abdomen soft [Non Tender] : non-tender [Non-distended] : non-distended [No Masses] : no abdominal mass palpated [No HSM] : no HSM [Normal Bowel Sounds] : normal bowel sounds [No Spinal Tenderness] : no spinal tenderness [No Rash] : no rash [No Focal Deficits] : no focal deficits [Normal Affect] : the affect was normal [Alert and Oriented x3] : oriented to person, place, and time [Normal Mood] : the mood was normal [Normal Insight/Judgement] : insight and judgment were intact [No Acute Distress] : no acute distress [No Skin Lesions] : no skin lesions

## 2021-09-09 NOTE — HISTORY OF PRESENT ILLNESS
[de-identified] : Edita is here today to follow-up with her .\par \par She is here to follow-up after 3 hospitalizations\par \par She was initially admitted to [facility] on 2021 where she underwent \par \par Appears she returned to the hospital on 2021 with abdominal pain and was found to have a pelvic fluid collection which was drained by IR.  She was treated with Zosyn which was then transitioned to Augmentin and was discharged on antibiotics.  She was seen by ID in the hospital.  She states she subsequently had IR drain removed.  She was discharged on 2021\par \par She states she was also admitted to [facility] from 2021 to 2021 with severe postpartum depression.  She states she was started on Abilify and benztropine.  She states she continues to follow-up with psychiatrist and is doing better now\par \par With regards to pelvic fluid collection she reports that she feels that she developed this infection because "bariatric surgery was performed without my permission at the time of ".  I explained to her that on reviewing hospital records on [facility] I did not see any records indicating that she had underwent bariatric surgery.  She states she did and states she has operative report at home but forgot to bring it.  She states she feels that she did have bariatric surgery because she is lost significant amount of weight.  I asked her to bring records to office for review.\par \par She states she is here today because she wanted her blood pressure checked.  She states she feels that her blood pressure has been high.\par \par She had Covid vaccine Moderna approximately 3 weeks ago.  She states she has recently noted a right breast lump but states she is lactating which may be contributing.  She states she has appointment to see GYN about this next week.  She denies any breast pain.\par \par

## 2021-09-09 NOTE — REVIEW OF SYSTEMS
[Negative] : Psychiatric [Fever] : no fever [Chills] : no chills [Fatigue] : no fatigue [Chest Pain] : no chest pain [Palpitations] : no palpitations [Lower Ext Edema] : no lower extremity edema [Shortness Of Breath] : no shortness of breath [Wheezing] : no wheezing [Cough] : no cough [Dyspnea on Exertion] : no dyspnea on exertion [Abdominal Pain] : no abdominal pain [Nausea] : no nausea [Diarrhea] : diarrhea [Vomiting] : no vomiting [Anxiety] : no anxiety [Depression] : no depression [de-identified] : see HPI

## 2021-09-09 NOTE — PHYSICAL EXAM
[Well-Appearing] : well-appearing [Normal Voice/Communication] : normal voice/communication [Normal Sclera/Conjunctiva] : normal sclera/conjunctiva [PERRL] : pupils equal round and reactive to light [Normal Oropharynx] : the oropharynx was normal [No JVD] : no jugular venous distention [No Lymphadenopathy] : no lymphadenopathy [Supple] : supple [Thyroid Normal, No Nodules] : the thyroid was normal and there were no nodules present [No Respiratory Distress] : no respiratory distress  [No Accessory Muscle Use] : no accessory muscle use [Clear to Auscultation] : lungs were clear to auscultation bilaterally [Normal Rate] : normal rate  [Regular Rhythm] : with a regular rhythm [Normal S1, S2] : normal S1 and S2 [No Murmur] : no murmur heard [No Edema] : there was no peripheral edema [No Extremity Clubbing/Cyanosis] : no extremity clubbing/cyanosis [Soft] : abdomen soft [Non Tender] : non-tender [Non-distended] : non-distended [No Masses] : no abdominal mass palpated [No HSM] : no HSM [Normal Bowel Sounds] : normal bowel sounds [No Spinal Tenderness] : no spinal tenderness [No Rash] : no rash [No Focal Deficits] : no focal deficits [Normal Affect] : the affect was normal [Alert and Oriented x3] : oriented to person, place, and time [Normal Mood] : the mood was normal [Normal Insight/Judgement] : insight and judgment were intact [No Acute Distress] : no acute distress [No Skin Lesions] : no skin lesions

## 2021-09-09 NOTE — ASSESSMENT
[FreeTextEntry1] : \par Pelvic abscess s/p \par -this appears to have resolved\par -she is followed by GYN and has f/u appt next week\par \par Obesity/Pre-DM:\par -BMI 38\par -HgA1c 6.4 3/2021\par -she has lost approx 20 lbs since 2020\par -unclear if this is related to recent hospitalizations\par -she does report "she had bariatric surgery at time of "-this does not appear to be apparent from records nor her abdominal imaging done in hospital\par -she reports she has operative report at home for bariatric surgery-she was advised to bring in\par -will check labs\par \par Right breast lump:\par -she is lactating and she recently had moderna vaccine (left arm)\par -I advised mammogram\par -she reports she does have a follow up appt with her GYN next week and will have breast exam there\par \par Severe post partum depression: requiring hospitalization\par -will try to get  records from [facility]\par -she is currently on abilify and benztropine\par -she states she is following up regularly with psychiatrist\par -she states she feels well now\par -PHQ 2 score 0\par \par Hypothyroidism:\par -she states she was only on levothyroxine during pregnancy but no longer taking\par -check TFTs\par \par Vitamin D deficiency:\par -check vitamin D level \par \par BP today 132/84\par -I advised low fat/low cholesterol diet, low salt diet, and weight loss\par -no need for HTN medication at this time\par \par Anemia:\par -check labs\par \par GERD:\par -on protonix\par \par HCM:\par \par Depression screenin2021 PHQ 2 score 0\par \par Flu shot: she will get at next visit as she is scheduled for covid vaccine next week\par \par HIV testin2021 negative\par \par Mammogram: ordered today 2021\par \par GYN/PAP: f/u with GYN advised\par \par \par \par F/U 2-3 weeks.  Will request hospital records from  and [facility] for review\par

## 2021-09-09 NOTE — REVIEW OF SYSTEMS
[Negative] : Psychiatric [Fever] : no fever [Chills] : no chills [Fatigue] : no fatigue [Chest Pain] : no chest pain [Palpitations] : no palpitations [Lower Ext Edema] : no lower extremity edema [Shortness Of Breath] : no shortness of breath [Wheezing] : no wheezing [Cough] : no cough [Dyspnea on Exertion] : no dyspnea on exertion [Abdominal Pain] : no abdominal pain [Nausea] : no nausea [Diarrhea] : diarrhea [Vomiting] : no vomiting [Anxiety] : no anxiety [Depression] : no depression [de-identified] : see HPI

## 2021-09-09 NOTE — HISTORY OF PRESENT ILLNESS
[de-identified] : Edita is here today to follow-up with her .\par \par She is here to follow-up after 3 hospitalizations\par \par She was initially admitted to [facility] on 2021 where she underwent \par \par Appears she returned to the hospital on 2021 with abdominal pain and was found to have a pelvic fluid collection which was drained by IR.  She was treated with Zosyn which was then transitioned to Augmentin and was discharged on antibiotics.  She was seen by ID in the hospital.  She states she subsequently had IR drain removed.  She was discharged on 2021\par \par She states she was also admitted to [facility] from 2021 to 2021 with severe postpartum depression.  She states she was started on Abilify and benztropine.  She states she continues to follow-up with psychiatrist and is doing better now\par \par With regards to pelvic fluid collection she reports that she feels that she developed this infection because "bariatric surgery was performed without my permission at the time of ".  I explained to her that on reviewing hospital records on HIE I did not see any records indicating that she had underwent bariatric surgery.  She states she did and states she has operative report at home but forgot to bring it.  She states she feels that she did have bariatric surgery because she is lost significant amount of weight.  I asked her to bring records to office for review.\par \par She states she is here today because she wanted her blood pressure checked.  She states she feels that her blood pressure has been high.\par \par She had Covid vaccine Moderna approximately 3 weeks ago.  She states she has recently noted a right breast lump but states she is lactating which may be contributing.  She states she has appointment to see GYN about this next week.  She denies any breast pain.\par \par

## 2021-09-09 NOTE — HEALTH RISK ASSESSMENT
[0] : 2) Feeling down, depressed, or hopeless: Not at all (0) [PHQ-2 Negative - No further assessment needed] : PHQ-2 Negative - No further assessment needed [[id number]] : 0

## 2021-09-09 NOTE — HEALTH RISK ASSESSMENT
[0] : 2) Feeling down, depressed, or hopeless: Not at all (0) [PHQ-2 Negative - No further assessment needed] : PHQ-2 Negative - No further assessment needed [[id number]] : 0

## 2021-09-10 ENCOUNTER — APPOINTMENT (OUTPATIENT)
Age: 46
End: 2021-09-10

## 2021-09-12 LAB
25(OH)D3 SERPL-MCNC: 22.4 NG/ML
ALBUMIN SERPL ELPH-MCNC: 4.1 G/DL
ALP BLD-CCNC: 80 U/L
ALT SERPL-CCNC: 14 U/L
ANION GAP SERPL CALC-SCNC: 12 MMOL/L
APPEARANCE: CLEAR
AST SERPL-CCNC: 15 U/L
BACTERIA: NEGATIVE
BASOPHILS # BLD AUTO: 0.02 K/UL
BASOPHILS NFR BLD AUTO: 0.4 %
BILIRUB SERPL-MCNC: <0.2 MG/DL
BILIRUBIN URINE: NEGATIVE
BLOOD URINE: NEGATIVE
BUN SERPL-MCNC: 10 MG/DL
CALCIUM SERPL-MCNC: 9.7 MG/DL
CHLORIDE SERPL-SCNC: 103 MMOL/L
CO2 SERPL-SCNC: 23 MMOL/L
COLOR: NORMAL
CREAT SERPL-MCNC: 0.61 MG/DL
EOSINOPHIL # BLD AUTO: 0.09 K/UL
EOSINOPHIL NFR BLD AUTO: 1.7 %
ERYTHROCYTE [SEDIMENTATION RATE] IN BLOOD BY WESTERGREN METHOD: 57 MM/HR
ESTIMATED AVERAGE GLUCOSE: 120 MG/DL
FERRITIN SERPL-MCNC: 12 NG/ML
FOLATE SERPL-MCNC: 11.1 NG/ML
GLUCOSE QUALITATIVE U: NEGATIVE
GLUCOSE SERPL-MCNC: 98 MG/DL
HBA1C MFR BLD HPLC: 5.8 %
HCT VFR BLD CALC: 33.4 %
HGB BLD-MCNC: 10 G/DL
HYALINE CASTS: 0 /LPF
IMM GRANULOCYTES NFR BLD AUTO: 0.2 %
IRON SATN MFR SERPL: 5 %
IRON SERPL-MCNC: 18 UG/DL
KETONES URINE: NEGATIVE
LEUKOCYTE ESTERASE URINE: NEGATIVE
LYMPHOCYTES # BLD AUTO: 1.92 K/UL
LYMPHOCYTES NFR BLD AUTO: 37.1 %
MAN DIFF?: NORMAL
MCHC RBC-ENTMCNC: 25.5 PG
MCHC RBC-ENTMCNC: 29.9 GM/DL
MCV RBC AUTO: 85.2 FL
MICROSCOPIC-UA: NORMAL
MONOCYTES # BLD AUTO: 0.4 K/UL
MONOCYTES NFR BLD AUTO: 7.7 %
NEUTROPHILS # BLD AUTO: 2.74 K/UL
NEUTROPHILS NFR BLD AUTO: 52.9 %
NITRITE URINE: NEGATIVE
PH URINE: 6.5
PLATELET # BLD AUTO: 312 K/UL
POTASSIUM SERPL-SCNC: 4.3 MMOL/L
PROT SERPL-MCNC: 7.4 G/DL
PROTEIN URINE: NEGATIVE
RBC # BLD: 3.92 M/UL
RBC # FLD: 18.2 %
RED BLOOD CELLS URINE: 1 /HPF
SODIUM SERPL-SCNC: 138 MMOL/L
SPECIFIC GRAVITY URINE: 1.02
SQUAMOUS EPITHELIAL CELLS: 3 /HPF
T4 FREE SERPL-MCNC: 1 NG/DL
TIBC SERPL-MCNC: 384 UG/DL
TSH SERPL-ACNC: 3.97 UIU/ML
UIBC SERPL-MCNC: 365 UG/DL
UROBILINOGEN URINE: NORMAL
VIT B12 SERPL-MCNC: 718 PG/ML
WBC # FLD AUTO: 5.18 K/UL
WHITE BLOOD CELLS URINE: 2 /HPF

## 2021-09-13 ENCOUNTER — APPOINTMENT (OUTPATIENT)
Dept: MAMMOGRAPHY | Facility: CLINIC | Age: 46
End: 2021-09-13

## 2021-09-13 ENCOUNTER — APPOINTMENT (OUTPATIENT)
Dept: ULTRASOUND IMAGING | Facility: CLINIC | Age: 46
End: 2021-09-13

## 2021-09-16 ENCOUNTER — NON-APPOINTMENT (OUTPATIENT)
Age: 46
End: 2021-09-16

## 2021-09-20 ENCOUNTER — APPOINTMENT (OUTPATIENT)
Dept: MAMMOGRAPHY | Facility: CLINIC | Age: 46
End: 2021-09-20
Payer: COMMERCIAL

## 2021-09-20 ENCOUNTER — RESULT REVIEW (OUTPATIENT)
Age: 46
End: 2021-09-20

## 2021-09-20 ENCOUNTER — APPOINTMENT (OUTPATIENT)
Dept: ULTRASOUND IMAGING | Facility: CLINIC | Age: 46
End: 2021-09-20
Payer: COMMERCIAL

## 2021-09-20 ENCOUNTER — OUTPATIENT (OUTPATIENT)
Dept: OUTPATIENT SERVICES | Facility: HOSPITAL | Age: 46
LOS: 1 days | End: 2021-09-20
Payer: COMMERCIAL

## 2021-09-20 DIAGNOSIS — O34.219 MATERNAL CARE FOR UNSPECIFIED TYPE SCAR FROM PREVIOUS CESAREAN DELIVERY: Chronic | ICD-10-CM

## 2021-09-20 DIAGNOSIS — Z00.8 ENCOUNTER FOR OTHER GENERAL EXAMINATION: ICD-10-CM

## 2021-09-20 PROCEDURE — 76642 ULTRASOUND BREAST LIMITED: CPT | Mod: 26,RT

## 2021-09-20 PROCEDURE — 77066 DX MAMMO INCL CAD BI: CPT

## 2021-09-20 PROCEDURE — 77062 BREAST TOMOSYNTHESIS BI: CPT | Mod: 26

## 2021-09-20 PROCEDURE — 77066 DX MAMMO INCL CAD BI: CPT | Mod: 26

## 2021-09-20 PROCEDURE — 76642 ULTRASOUND BREAST LIMITED: CPT

## 2021-09-20 PROCEDURE — G0279: CPT

## 2021-09-22 ENCOUNTER — NON-APPOINTMENT (OUTPATIENT)
Age: 46
End: 2021-09-22

## 2021-09-28 ENCOUNTER — APPOINTMENT (OUTPATIENT)
Dept: INTERNAL MEDICINE | Facility: CLINIC | Age: 46
End: 2021-09-28
Payer: COMMERCIAL

## 2021-09-28 VITALS
SYSTOLIC BLOOD PRESSURE: 143 MMHG | BODY MASS INDEX: 39.27 KG/M2 | HEART RATE: 83 BPM | HEIGHT: 61 IN | TEMPERATURE: 97.9 F | RESPIRATION RATE: 16 BRPM | DIASTOLIC BLOOD PRESSURE: 88 MMHG | OXYGEN SATURATION: 99 % | WEIGHT: 208 LBS

## 2021-09-28 PROCEDURE — G0008: CPT

## 2021-09-28 PROCEDURE — 99214 OFFICE O/P EST MOD 30 MIN: CPT | Mod: 25

## 2021-09-28 PROCEDURE — 90686 IIV4 VACC NO PRSV 0.5 ML IM: CPT

## 2021-09-29 ENCOUNTER — APPOINTMENT (OUTPATIENT)
Dept: OBGYN | Facility: CLINIC | Age: 46
End: 2021-09-29

## 2021-10-03 NOTE — ASSESSMENT
[FreeTextEntry1] : \par Pelvic abscess s/p \par -She reports she still occasionally feels some pelvic pressure but denies significant pain, fever/chills\par -She states she did see her GYN and had pelvic ultrasound.  She has follow-up with GYN in 2 weeks\par -will try and get GYN records from Dr. 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  Will refill Abilify x1 month but should see psychiatrist for further refills\par -She states she has appointment to see her psychiatrist this weekend\par -She reports she continues to feel depressed and does not feel that she can return to work yet\par -No SI/HI\par -Disability forms filled out for patient\par \par Hypothyroidism:\par -She is on no medications\par -This occurred in the setting of pregnancy\par -Her recent TFTs were normal\par \par Vitamin D deficiency:\par -I have advised vitamin D3 1000 units once a day\par \par Elevated ESR at 57\par -I advise repeat ESR in 2 months\par \par Anemia/iron deficiency\par -This is likely due to her menses \par -I have advised she continue to take prenatal vitamin\par -Check CBC again in 2 months\par \par Hypertension:\par -Risks of hypertension discussed\par -I have advised low-fat, low-cholesterol, low-carb diet and low salt diet\par -Weight loss advised\par -start amlodipine 5 mg daily  (R/B/A/side effects discussed)\par -Follow-up 6 weeks for BP check\par \par GERD:\par -on protonix-refilled today\par \par HCM:\par \par Depression screenin2021 PHQ 2 score 0\par \par Flu shot: advised.  R/B discussed.  No egg allergy reported.  VIS given.  Flu shot given today 2021\par \par Covid vaccine: Moderna\par \par HIV testin2021 negative\par \par Mammogram: 2021 BR 3-needs follow-up right breast ultrasound in 6 months\par \par GYN/PAP: f/u with GYN advised\par \par F/U 6 weeks for BP check\par

## 2021-10-03 NOTE — HISTORY OF PRESENT ILLNESS
[de-identified] : Here today for follow-up\par \par She needs disability forms filled out as she still has not been able to return to work.  She states her maternity leave is ending and states she needs further form filled out that she will still not be going back to work due to her depression.\par \par She states she continues to feel very depressed.  She states she has been seeing therapist.  She states she is scheduled to see her psychiatrist this Saturday.. She states she she will be running out of her Abilify and needs a refill prior to Saturday when she sees her psychiatrist.  No SI/HI.  She denies any delusions/psychosis\par \par She states she still occasionally continues to feel some pressure in her pelvic area.  She states she did see GYN who did an ultrasound.  She states she has a follow-up with her GYN doctor-Dr. Vincent in 2 weeks\par \par She states she stopped working 7/16/2021 and states her maternity leave ended 9/22/2021.  She states she needs disability forms filled out for her time missed at work after 9/22/2021\par \par She also reports that her blood pressures have been high at home.  She states at home she has been getting systolic blood pressures in the 160s.  She denies headache, chest pain, shortness of breath, palpitations, lower extremity edema

## 2021-10-03 NOTE — PHYSICAL EXAM
[No Acute Distress] : no acute distress [Normal Voice/Communication] : normal voice/communication [Normal Sclera/Conjunctiva] : normal sclera/conjunctiva [PERRL] : pupils equal round and reactive to light [Normal Oropharynx] : the oropharynx was normal [No JVD] : no jugular venous distention [No Lymphadenopathy] : no lymphadenopathy [Supple] : supple [Thyroid Normal, No Nodules] : the thyroid was normal and there were no nodules present [No Respiratory Distress] : no respiratory distress  [No Accessory Muscle Use] : no accessory muscle use [Clear to Auscultation] : lungs were clear to auscultation bilaterally [Normal Rate] : normal rate  [Regular Rhythm] : with a regular rhythm [Normal S1, S2] : normal S1 and S2 [No Murmur] : no murmur heard [No Edema] : there was no peripheral edema [No Extremity Clubbing/Cyanosis] : no extremity clubbing/cyanosis [Soft] : abdomen soft [Non Tender] : non-tender [Non-distended] : non-distended [No Masses] : no abdominal mass palpated [No HSM] : no HSM [Normal Bowel Sounds] : normal bowel sounds [No Spinal Tenderness] : no spinal tenderness [No Rash] : no rash [No Focal Deficits] : no focal deficits [Normal Affect] : the affect was normal [Alert and Oriented x3] : oriented to person, place, and time [Normal Insight/Judgement] : insight and judgment were intact [de-identified] : Depressed mood, tearful/crying at times during visit

## 2021-10-03 NOTE — REVIEW OF SYSTEMS
[Negative] : Neurological [Depression] : depression [Fever] : no fever [Chills] : no chills [Fatigue] : no fatigue [Chest Pain] : no chest pain [Palpitations] : no palpitations [Lower Ext Edema] : no lower extremity edema [Shortness Of Breath] : no shortness of breath [Wheezing] : no wheezing [Cough] : no cough [Dyspnea on Exertion] : no dyspnea on exertion [Nausea] : no nausea [Diarrhea] : diarrhea [Vomiting] : no vomiting [Suicidal] : not suicidal [Insomnia] : no insomnia [Anxiety] : no anxiety [FreeTextEntry7] : See HPI [FreeTextEntry8] : See HPI [de-identified] : see HPI

## 2021-11-10 ENCOUNTER — APPOINTMENT (OUTPATIENT)
Dept: INTERNAL MEDICINE | Facility: CLINIC | Age: 46
End: 2021-11-10
Payer: COMMERCIAL

## 2021-11-10 VITALS
BODY MASS INDEX: 39.65 KG/M2 | HEIGHT: 61 IN | DIASTOLIC BLOOD PRESSURE: 84 MMHG | SYSTOLIC BLOOD PRESSURE: 122 MMHG | HEART RATE: 82 BPM | RESPIRATION RATE: 16 BRPM | WEIGHT: 210 LBS | TEMPERATURE: 98 F | OXYGEN SATURATION: 97 %

## 2021-11-10 DIAGNOSIS — Z13.31 ENCOUNTER FOR SCREENING FOR DEPRESSION: ICD-10-CM

## 2021-11-10 PROCEDURE — 99213 OFFICE O/P EST LOW 20 MIN: CPT | Mod: 25

## 2021-11-10 PROCEDURE — 36415 COLL VENOUS BLD VENIPUNCTURE: CPT

## 2021-11-10 NOTE — HISTORY OF PRESENT ILLNESS
[de-identified] : Here for follow up\par \par She states she feels much better\par \par She has been seeing psychiatrist and therapist.  She states she feels much better.  She states she is still not back at work.  She states she still has some time left on her maternity leave

## 2021-11-10 NOTE — ASSESSMENT
[FreeTextEntry1] : \par Pelvic abscess s/p \par -she states she recently saw GYN and told pelvic abscess has resolved\par -she is asymptoamtic\par \par Obesity/Pre-DM:\par -hemoglobin A1c was 5.8\par -she should adhere to low fat/low cholesterol diet, low carbohydrate diet and weight loss advised\par \par Right breast lump:\par -She had mammogram and breast ultrasound which revealed benign-appearing right axilla lymph node\par -A follow-up right breast ultrasound advised in 6 months-3/2022\par \par Severe post partum depression with psychotic features:\par -on Abilify\par -seeing psychiatry and therapist\par -feels much better\par -No SI/HI\par \par Hypothyroidism:\par -She is on no medications\par -This occurred in the setting of pregnancy\par -Her recent TFTs were normal\par \par Vitamin D deficiency:\par -vitamin D3 1000 units once a day\par \par Elevated ESR at 57\par -I advise repeat ESR \par \par Anemia/iron deficiency\par -This is likely due to her menses \par -prenatal vitamin\par -Check CBC\par \par Hypertension:\par -BP at goal on amlodipine 5 mg daily \par \par GERD:\par -on protonix\par \par HCM:\par \par Depression screenin2021 PHQ 9 score 0\par \par Flu shot:  2021\par \par Covid vaccine: Moderna\par \par HIV testin2021 negative\par \par Mammogram: 2021 BR 3-needs follow-up right breast ultrasound in 6 months 3/2022\par \par GYN/PAP: 10/2021 per pt\par \par F/U 3 months.  Labs drawn in office today\par

## 2021-11-10 NOTE — REVIEW OF SYSTEMS
[Fever] : no fever [Chills] : no chills [Chest Pain] : no chest pain [Palpitations] : no palpitations [Lower Ext Edema] : no lower extremity edema [Shortness Of Breath] : no shortness of breath [Wheezing] : no wheezing [Cough] : no cough [Dyspnea on Exertion] : no dyspnea on exertion [Abdominal Pain] : no abdominal pain [Nausea] : no nausea [Diarrhea] : diarrhea [Vomiting] : no vomiting [Anxiety] : no anxiety [Depression] : no depression [Negative] : Integumentary

## 2021-11-10 NOTE — HEALTH RISK ASSESSMENT
[PHQ-9 Negative - No further assessment needed] : PHQ-9 Negative - No further assessment needed [Patient reported PAP Smear was normal] : Patient reported PAP Smear was normal [PapSmearDate] : 10/21

## 2021-11-10 NOTE — PHYSICAL EXAM
[No Acute Distress] : no acute distress [Normal Voice/Communication] : normal voice/communication [Normal Sclera/Conjunctiva] : normal sclera/conjunctiva [PERRL] : pupils equal round and reactive to light [Normal Oropharynx] : the oropharynx was normal [No JVD] : no jugular venous distention [No Respiratory Distress] : no respiratory distress  [No Accessory Muscle Use] : no accessory muscle use [Clear to Auscultation] : lungs were clear to auscultation bilaterally [Normal Rate] : normal rate  [Regular Rhythm] : with a regular rhythm [Normal S1, S2] : normal S1 and S2 [No Murmur] : no murmur heard [No Edema] : there was no peripheral edema [No Extremity Clubbing/Cyanosis] : no extremity clubbing/cyanosis [Non Tender] : non-tender [Non-distended] : non-distended [No Masses] : no abdominal mass palpated [Normal Bowel Sounds] : normal bowel sounds [No Rash] : no rash [Normal Affect] : the affect was normal [Alert and Oriented x3] : oriented to person, place, and time [No Lymphadenopathy] : no lymphadenopathy [Supple] : supple [Thyroid Normal, No Nodules] : the thyroid was normal and there were no nodules present [Soft] : abdomen soft [No HSM] : no HSM [Normal Mood] : the mood was normal

## 2021-11-14 LAB
BASOPHILS # BLD AUTO: 0.02 K/UL
BASOPHILS NFR BLD AUTO: 0.5 %
EOSINOPHIL # BLD AUTO: 0.1 K/UL
EOSINOPHIL NFR BLD AUTO: 2.3 %
ERYTHROCYTE [SEDIMENTATION RATE] IN BLOOD BY WESTERGREN METHOD: 31 MM/HR
HCT VFR BLD CALC: 34.8 %
HGB BLD-MCNC: 10.8 G/DL
IMM GRANULOCYTES NFR BLD AUTO: 0 %
LYMPHOCYTES # BLD AUTO: 1.72 K/UL
LYMPHOCYTES NFR BLD AUTO: 39.4 %
MAN DIFF?: NORMAL
MCHC RBC-ENTMCNC: 25.5 PG
MCHC RBC-ENTMCNC: 31 GM/DL
MCV RBC AUTO: 82.1 FL
MONOCYTES # BLD AUTO: 0.34 K/UL
MONOCYTES NFR BLD AUTO: 7.8 %
NEUTROPHILS # BLD AUTO: 2.19 K/UL
NEUTROPHILS NFR BLD AUTO: 50 %
PLATELET # BLD AUTO: 291 K/UL
RBC # BLD: 4.24 M/UL
RBC # FLD: 17.7 %
WBC # FLD AUTO: 4.37 K/UL

## 2021-11-16 ENCOUNTER — NON-APPOINTMENT (OUTPATIENT)
Age: 46
End: 2021-11-16

## 2021-12-08 ENCOUNTER — NON-APPOINTMENT (OUTPATIENT)
Age: 46
End: 2021-12-08

## 2021-12-08 ENCOUNTER — APPOINTMENT (OUTPATIENT)
Dept: INTERNAL MEDICINE | Facility: CLINIC | Age: 46
End: 2021-12-08
Payer: COMMERCIAL

## 2021-12-08 VITALS
BODY MASS INDEX: 39.84 KG/M2 | OXYGEN SATURATION: 99 % | DIASTOLIC BLOOD PRESSURE: 80 MMHG | HEIGHT: 61 IN | WEIGHT: 211 LBS | SYSTOLIC BLOOD PRESSURE: 124 MMHG | HEART RATE: 75 BPM | RESPIRATION RATE: 16 BRPM | TEMPERATURE: 98.1 F

## 2021-12-08 PROCEDURE — 99213 OFFICE O/P EST LOW 20 MIN: CPT

## 2021-12-08 NOTE — PHYSICAL EXAM
[No Acute Distress] : no acute distress [Normal Voice/Communication] : normal voice/communication [Normal Sclera/Conjunctiva] : normal sclera/conjunctiva [PERRL] : pupils equal round and reactive to light [Normal Oropharynx] : the oropharynx was normal [No JVD] : no jugular venous distention [No Lymphadenopathy] : no lymphadenopathy [Supple] : supple [Thyroid Normal, No Nodules] : the thyroid was normal and there were no nodules present [No Respiratory Distress] : no respiratory distress  [No Accessory Muscle Use] : no accessory muscle use [Clear to Auscultation] : lungs were clear to auscultation bilaterally [Normal Rate] : normal rate  [Regular Rhythm] : with a regular rhythm [Normal S1, S2] : normal S1 and S2 [No Murmur] : no murmur heard [No Edema] : there was no peripheral edema [No Extremity Clubbing/Cyanosis] : no extremity clubbing/cyanosis [Soft] : abdomen soft [Non Tender] : non-tender [Non-distended] : non-distended [No Masses] : no abdominal mass palpated [No HSM] : no HSM [Normal Bowel Sounds] : normal bowel sounds [No Rash] : no rash [Normal Affect] : the affect was normal [Alert and Oriented x3] : oriented to person, place, and time [Normal Mood] : the mood was normal [Well-Appearing] : well-appearing [No Focal Deficits] : no focal deficits [Normal Insight/Judgement] : insight and judgment were intact

## 2021-12-08 NOTE — HISTORY OF PRESENT ILLNESS
[de-identified] : Here for follow up\par \par She needs note to go back to work\par \par She states she feels well and denies any complaints

## 2021-12-08 NOTE — REVIEW OF SYSTEMS
[Fever] : no fever [Chills] : no chills [Chest Pain] : no chest pain [Palpitations] : no palpitations [Lower Ext Edema] : no lower extremity edema [Shortness Of Breath] : no shortness of breath [Wheezing] : no wheezing [Cough] : no cough [Dyspnea on Exertion] : no dyspnea on exertion [Abdominal Pain] : no abdominal pain [Nausea] : no nausea [Diarrhea] : diarrhea [Vomiting] : no vomiting [Anxiety] : no anxiety [Depression] : no depression [Negative] : Neurological

## 2022-03-10 ENCOUNTER — APPOINTMENT (OUTPATIENT)
Dept: INTERNAL MEDICINE | Facility: CLINIC | Age: 47
End: 2022-03-10

## 2022-04-06 ENCOUNTER — NON-APPOINTMENT (OUTPATIENT)
Age: 47
End: 2022-04-06

## 2022-10-25 NOTE — ED STATDOCS - CPE ED CARDIAC NORM
Patient here for immunotherapy. Patient / parent report no problems or concerns, allergy symptoms under good control, no change in injection site after last injection, asthma under good control, has not needed rescue inhaler, peak flow 300, lungs clear. MD notified and cleared for immunotherapy, see nursing communication order, repeat 0.1ml but patient not to take his Albuterol before his injection this time. Immunotherapy pets expires 10/7/23 0.1ml administered in clinic at 1605, patient monitored in clinic x 30 minutes, Epi and Benadryl on hand, parent has Epi Pen.  After 30 minutes, site with dime sized wheal. Patient evaluated by MD, occasional cough but lungs clear, peak flow 280, left with parent in no distress at 1640. RTC 1 week for next injections    
normal...

## 2022-11-21 ENCOUNTER — FORM ENCOUNTER (OUTPATIENT)
Age: 47
End: 2022-11-21

## 2022-11-22 ENCOUNTER — APPOINTMENT (OUTPATIENT)
Dept: INTERNAL MEDICINE | Facility: CLINIC | Age: 47
End: 2022-11-22

## 2022-11-22 VITALS
TEMPERATURE: 97.8 F | WEIGHT: 228 LBS | OXYGEN SATURATION: 99 % | HEART RATE: 85 BPM | HEIGHT: 61 IN | DIASTOLIC BLOOD PRESSURE: 88 MMHG | RESPIRATION RATE: 15 BRPM | BODY MASS INDEX: 43.05 KG/M2 | SYSTOLIC BLOOD PRESSURE: 132 MMHG

## 2022-11-22 DIAGNOSIS — Z78.9 OTHER SPECIFIED HEALTH STATUS: ICD-10-CM

## 2022-11-22 DIAGNOSIS — R25.2 CRAMP AND SPASM: ICD-10-CM

## 2022-11-22 PROCEDURE — 96127 BRIEF EMOTIONAL/BEHAV ASSMT: CPT

## 2022-11-22 PROCEDURE — 90686 IIV4 VACC NO PRSV 0.5 ML IM: CPT

## 2022-11-22 PROCEDURE — G0008: CPT

## 2022-11-22 PROCEDURE — 99215 OFFICE O/P EST HI 40 MIN: CPT | Mod: 25

## 2022-11-22 RX ORDER — PANTOPRAZOLE 40 MG/1
40 TABLET, DELAYED RELEASE ORAL DAILY
Qty: 1 | Refills: 1 | Status: DISCONTINUED | COMMUNITY
Start: 2021-09-09 | End: 2022-11-22

## 2022-11-22 RX ORDER — BENZTROPINE MESYLATE 0.5 MG/1
0.5 TABLET ORAL
Refills: 0 | Status: DISCONTINUED | COMMUNITY
Start: 2021-09-09 | End: 2022-11-22

## 2022-11-22 RX ORDER — ARIPIPRAZOLE 10 MG/1
10 TABLET ORAL
Qty: 30 | Refills: 0 | Status: DISCONTINUED | COMMUNITY
Start: 2021-09-09 | End: 2022-11-22

## 2022-11-22 RX ORDER — AMLODIPINE BESYLATE 5 MG/1
5 TABLET ORAL DAILY
Qty: 30 | Refills: 2 | Status: DISCONTINUED | COMMUNITY
Start: 2021-09-28 | End: 2022-11-22

## 2022-11-22 RX ORDER — ASCORBIC ACID, CHOLECALCIFEROL, .ALPHA.-TOCOPHEROL, DL-, FOLIC ACID, PYRIDOXINE HYDROCHLORIDE, CYANOCOBALAMIN, CALCIUM FORMATE, FERROUS ASPARTO GLYCINATE, MAGNESIUM OXIDE AND DOCONEXENT 90; 400; 40; 1; 26; 25; 155; 18; 50; 300 MG/1; [IU]/1; [IU]/1; MG/1; MG/1; UG/1; MG/1; MG/1; MG/1; MG/1
18-0.6-0.4-3 CAPSULE, GELATIN COATED ORAL
Qty: 90 | Refills: 3 | Status: DISCONTINUED | COMMUNITY
Start: 2020-12-08 | End: 2022-11-22

## 2022-11-24 PROBLEM — R25.2 MUSCLE CRAMPS: Status: ACTIVE | Noted: 2022-11-24

## 2022-11-24 NOTE — REVIEW OF SYSTEMS
[Depression] : depression [Negative] : ENT [Fever] : no fever [Chills] : no chills [Chest Pain] : no chest pain [Palpitations] : no palpitations [Lower Ext Edema] : no lower extremity edema [Shortness Of Breath] : no shortness of breath [Wheezing] : no wheezing [Cough] : no cough [Dyspnea on Exertion] : no dyspnea on exertion [Abdominal Pain] : no abdominal pain [Nausea] : no nausea [Diarrhea] : diarrhea [Vomiting] : no vomiting [Suicidal] : not suicidal [Insomnia] : no insomnia [Anxiety] : no anxiety [FreeTextEntry2] : Weight gain [FreeTextEntry9] : See HPI

## 2022-11-24 NOTE — ASSESSMENT
[FreeTextEntry1] : \par Muscle cramps in hands\par -I have advised that she stay hydrated\par -Check labs\par \par Left foot pain:\par -Likely due to Planter fasciitis\par -Weight loss advised\par -I have advised that she can use OTC Tylenol or Advil as needed\par -I have advised that she see podiatry\par \par Obesity/Pre-DM:\par -hemoglobin A1c was 5.8\par -Risks of obesity discussed\par -Benefits of weight loss discussed\par -low fat/low cholesterol diet, low carbohydrate diet and weight loss advised\par -I have advised that she avoid sugary drinks\par -I have advised that she decrease portion size\par -Discussed treatment options for obesity including medications such as Wegovy or Mounjaro\par -I did discuss with her that Mounjaro is currently only approved for diabetes at this time\par -I will check labs today\par -She will follow-up in 2 to 3 weeks to further discuss obesity management\par \par History of right breast lump:\par -She had mammogram and breast ultrasound which revealed benign-appearing right axilla lymph node\par -A follow-up right breast ultrasound advised in 6 months-3/2022 but she never went for studies\par -Appears GYN has ordered a follow-up breast imaging which she will schedule soon\par \par Left breast pain\par -Recently seen by GYN and had normal breast exam per patient\par -Mammogram and breast ultrasound have been ordered by GYN which she will schedule soon\par \par Depression:\par -She has history of severe post partum depression with psychotic features\par -She was previously on Abilify 10 mg at bedtime and has been doing well on medication\par -She self discontinued medication about 3 to 4 months ago\par -She was previously seeing psychiatry and therapist but she stopped seeing about 6 months ago\par -No SI/HI\par -I have advised that she should try and follow-up with her psychiatrist.  She states she will reach out to psychiatrist and see if she can reestablish care.  She does not know if they will take her back for follow-up right away or she will need to reestablish as a new patient.\par -I have also put in a referral for behavioral health care management in case she has difficulty reestablishing with her psychiatrist\par -For now I will restart her Abilify 10 mg at bedtime as it appears to work well for her in the past and she did not have any significant side effects.  Risks/benefits/side effects of medication including weight gain discussed\par -She is to notify office of any new or worsening symptom\par -She is to follow-up in 2 to 3 weeks\par \par Vitamin D deficiency:\par -Check vitamin D 25 [place]\par \par History of anemia/iron deficiency\par -Check labs\par \par Hypertension:\par -BP mildly elevated today at 132/88\par -Weight gain noted\par -She was previously on amlodipine but self discontinued her medication\par -I advised low fat/low cholesterol diet, low salt diet, and weight loss\par -For now can monitor off medication but if BP remains elevated may need to restart amlodipine\par -Follow-up in 2 to 3 weeks for BP check\par \par History of GERD:\par -Currently with no symptoms\par \par S/P COVID-19 10/2022\par -She was recently seen in urgent care for shortness of breath\par -She had chest x-ray which was negative\par -EKG in urgent care 2022 NSR at 85 with no ST abnormalities\par -She states symptoms have resolved\par -COVID-19 booster vaccine advised 3 months after her infection\par \par HCM:\par \par Depression screenin2022 PHQ 9 score 4\par \par Flu shot:  advised.  R/B discussed.  No egg allergy reported.  VIS given.  Flu shot given today 2022\par \par Tdap: 2021\par \par COVID-vaccine: Moderna x 2-COVID booster vaccine advised 3 months after her recent COVID-19 infection\par \par HIV testin2021 negative-HIV testing offered 2022 and she consented to testing\par \par Mammogram: Mammogram and breast ultrasound ordered by GYN which she will schedule soon\par \par GYN/PAP: 10/2021 per pt-she states she was recently seen by her GYN\par \par Colon cancer screening: She is now 47 and due for colonoscopy.  I referred her to GI today\par \par F/U 2 to 3 weeks.  Labs drawn in office today\par

## 2022-11-24 NOTE — HISTORY OF PRESENT ILLNESS
[de-identified] : Here today for evaluation of multiple complaints\par \par She reports she has been getting cramps in her fingers from time to time.\par \par She also reports that she gets pain in the bottom of left foot from time to time.  She states pain seems to be worse when she stands up in the morning.  She describes pain as burning.  She states it gets better as she walks around on it and day goes on.  She denies any trauma.\par \par She also reports that recently she has been having some left breast pain.  She states she recently saw her gynecologist about this.  She states she was told breast exam was normal.  She states gynecologist ordered a mammogram and breast ultrasound which she has not done yet.  She states she will be scheduling soon.\par \par She also reports she had COVID-19 last month.  She states she was diagnosed October 24, 2022.  She states she was recently seen in urgent care because she was having some shortness of breath.  She states she was told her blood pressure was high at urgent care.  She states that evaluation in urgent care including EKG and chest x-ray was normal.  She states since then symptoms have resolved.  She currently denies any shortness of breath, chest pain, palpitations, dizziness\par \par She states she self discontinued her amlodipine for her blood pressure.  She states she stopped it because she did not feel that she needed it.\par \par She also reports that recently she has been feeling depressed.  She states she has been having some relationship issues with her boyfriend.  She states they fight a lot.  She states they have a child together.  She denies any physical abuse.  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  She states she stopped seeing psychiatrist about 6 months ago.  She states she stopped Abilify about 3 to 4 months ago.  She states she thought she felt fine and thought she did not need it.  She denies any SI or HI.  She denies any abnormal thoughts.  She states Abilify was working and states she had no side effects from it.\par \par She reports she has also gained weight and has some difficulty losing weight.  She states a friend of hers was prescribed Mounjaro and wants to know if this is an option for her\par LMP: 11/22/2022

## 2022-11-24 NOTE — PHYSICAL EXAM
[No Acute Distress] : no acute distress [Well-Appearing] : well-appearing [Normal Voice/Communication] : normal voice/communication [Normal Sclera/Conjunctiva] : normal sclera/conjunctiva [PERRL] : pupils equal round and reactive to light [Normal Oropharynx] : the oropharynx was normal [No JVD] : no jugular venous distention [No Lymphadenopathy] : no lymphadenopathy [Supple] : supple [Thyroid Normal, No Nodules] : the thyroid was normal and there were no nodules present [No Respiratory Distress] : no respiratory distress  [No Accessory Muscle Use] : no accessory muscle use [Clear to Auscultation] : lungs were clear to auscultation bilaterally [Normal Rate] : normal rate  [Regular Rhythm] : with a regular rhythm [Normal S1, S2] : normal S1 and S2 [No Murmur] : no murmur heard [No Edema] : there was no peripheral edema [No Extremity Clubbing/Cyanosis] : no extremity clubbing/cyanosis [Soft] : abdomen soft [Non Tender] : non-tender [Non-distended] : non-distended [No Masses] : no abdominal mass palpated [No HSM] : no HSM [Normal Bowel Sounds] : normal bowel sounds [No Rash] : no rash [No Focal Deficits] : no focal deficits [Normal Affect] : the affect was normal [Alert and Oriented x3] : oriented to person, place, and time [Normal Insight/Judgement] : insight and judgment were intact [No Carotid Bruits] : no carotid bruits [No Spinal Tenderness] : no spinal tenderness [No Joint Swelling] : no joint swelling [No Skin Lesions] : no skin lesions [de-identified] : No calf tenderness [de-identified] : Bilateral hands with no tenderness or swelling.  Left foot no swelling, deformity, erythema, tenderness [de-identified] : Depressed mood, tearful at times during visit

## 2022-11-25 LAB
25(OH)D3 SERPL-MCNC: 18.6 NG/ML
ALBUMIN SERPL ELPH-MCNC: 4.4 G/DL
ALP BLD-CCNC: 75 U/L
ALT SERPL-CCNC: 10 U/L
ANION GAP SERPL CALC-SCNC: 10 MMOL/L
AST SERPL-CCNC: 11 U/L
BASOPHILS # BLD AUTO: 0.04 K/UL
BASOPHILS NFR BLD AUTO: 0.8 %
BILIRUB SERPL-MCNC: 0.2 MG/DL
BUN SERPL-MCNC: 10 MG/DL
CALCIUM SERPL-MCNC: 9.7 MG/DL
CHLORIDE SERPL-SCNC: 103 MMOL/L
CHOLEST SERPL-MCNC: 180 MG/DL
CO2 SERPL-SCNC: 26 MMOL/L
CREAT SERPL-MCNC: 0.57 MG/DL
EGFR: 114 ML/MIN/1.73M2
EOSINOPHIL # BLD AUTO: 0.11 K/UL
EOSINOPHIL NFR BLD AUTO: 2.1 %
ESTIMATED AVERAGE GLUCOSE: 137 MG/DL
GLUCOSE SERPL-MCNC: 91 MG/DL
HBA1C MFR BLD HPLC: 6.4 %
HCT VFR BLD CALC: 37.4 %
HDLC SERPL-MCNC: 50 MG/DL
HGB BLD-MCNC: 11.7 G/DL
HIV1+2 AB SPEC QL IA.RAPID: NONREACTIVE
IMM GRANULOCYTES NFR BLD AUTO: 0.2 %
LDLC SERPL CALC-MCNC: 117 MG/DL
LYMPHOCYTES # BLD AUTO: 1.68 K/UL
LYMPHOCYTES NFR BLD AUTO: 32.6 %
MAN DIFF?: NORMAL
MCHC RBC-ENTMCNC: 26.6 PG
MCHC RBC-ENTMCNC: 31.3 GM/DL
MCV RBC AUTO: 85 FL
MONOCYTES # BLD AUTO: 0.44 K/UL
MONOCYTES NFR BLD AUTO: 8.5 %
NEUTROPHILS # BLD AUTO: 2.88 K/UL
NEUTROPHILS NFR BLD AUTO: 55.8 %
NONHDLC SERPL-MCNC: 129 MG/DL
PLATELET # BLD AUTO: 322 K/UL
POTASSIUM SERPL-SCNC: 4.2 MMOL/L
PROT SERPL-MCNC: 8 G/DL
RBC # BLD: 4.4 M/UL
RBC # FLD: 16.8 %
SODIUM SERPL-SCNC: 139 MMOL/L
T4 FREE SERPL-MCNC: 1.2 NG/DL
TRIGL SERPL-MCNC: 61 MG/DL
TSH SERPL-ACNC: 4.34 UIU/ML
WBC # FLD AUTO: 5.16 K/UL

## 2022-11-28 ENCOUNTER — NON-APPOINTMENT (OUTPATIENT)
Age: 47
End: 2022-11-28

## 2022-12-01 ENCOUNTER — APPOINTMENT (OUTPATIENT)
Dept: INTERNAL MEDICINE | Facility: CLINIC | Age: 47
End: 2022-12-01

## 2022-12-07 ENCOUNTER — APPOINTMENT (OUTPATIENT)
Dept: INTERNAL MEDICINE | Facility: CLINIC | Age: 47
End: 2022-12-07

## 2022-12-07 ENCOUNTER — FORM ENCOUNTER (OUTPATIENT)
Age: 47
End: 2022-12-07

## 2022-12-07 VITALS
SYSTOLIC BLOOD PRESSURE: 122 MMHG | RESPIRATION RATE: 16 BRPM | WEIGHT: 227 LBS | TEMPERATURE: 97.6 F | BODY MASS INDEX: 42.86 KG/M2 | OXYGEN SATURATION: 99 % | HEIGHT: 61 IN | HEART RATE: 90 BPM | DIASTOLIC BLOOD PRESSURE: 82 MMHG

## 2022-12-07 DIAGNOSIS — R73.03 PREDIABETES.: ICD-10-CM

## 2022-12-07 DIAGNOSIS — F32.A DEPRESSION, UNSPECIFIED: ICD-10-CM

## 2022-12-07 PROCEDURE — 99214 OFFICE O/P EST MOD 30 MIN: CPT

## 2022-12-07 NOTE — ASSESSMENT
[FreeTextEntry1] : \par \par Left foot pain:\par -Likely due to Planter fasciitis\par -Weight loss advised\par -I have advised that she can use OTC Tylenol or Advil as needed\par -she was previously referred to podiatry\par \par Obesity/Pre-DM:\par -hemoglobin A1c was 6.4 2022\par -low fat/low cholesterol diet, low carbohydrate diet and weight loss advised\par -I have advised that she avoid sugary drinks\par -I have advised that she decrease portion size\par -she states she does not have time to eat healthy\par -she states she stress eats\par -We again discussed treatment options for obesity including medications.  She states she wants to try Wegovy.  Risk/benefits/alternatives/side effects of Wegovy discussed at length with patient.  I used teaching pen and showed her how to perform injections.  She denies any history of thyroid cancer or pancreatitis.  She denies any family history of thyroid cancer, pancreatitis or MEN syndrome\par -She should start Wegovy 0.25 mg once weekly x4 weeks and then increase to 0.5 mg once weekly for 4 more weeks\par -Follow-up in 4 weeks advised\par -Advise her she can always bring in pen to office and we can demonstrate how to give injection if she has any issues\par \par History of right breast lump:\par -She had mammogram and breast ultrasound which revealed benign-appearing right axilla lymph node\par -A follow-up right breast ultrasound advised in 6 months-3/2022 but she never went for studies\par -Appears GYN has ordered a follow-up breast imaging which she has scheduled for tomorrow\par \par Left breast pain\par -Recently seen by GYN and had normal breast exam per patient\par -Mammogram and breast ultrasound have been ordered by GYN which she has scheduled for tomorrow\par \par Depression:\par -She has history of severe post partum depression with psychotic features\par -She was previously on Abilify 10 mg at bedtime and has been doing well on medication\par -She self discontinued medication about 3 to 4 months ago\par -She was previously seeing psychiatry and therapist but she stopped seeing about 6 months ago\par -No SI/HI\par -I again advised that she start Abilify 10 mg at bedtime as it appears to work well for her in the past and she did not have any significant side effects.  Prescription sent to pharmacy again today\par -She is to notify office of any new or worsening symptom\par -She has appointment to speak to behavioral health care manager tomorrow\par \par Vitamin D Deficiency:\par -I advised ergocalciferol 50,000 units once a week for 12 weeks\par -After finishing 12 week course of ergocalciferol, I adv starting over the counter vitamin D 3 1000 units daily\par -I advised repeat vitamin D 25-OH level in 3 months\par \par History of anemia/iron deficiency\par -Recent labs showed no anemia\par \par Hypertension:\par -BP at goal today\par \par Elevated TSH\par -TSH was 4.34\par -I advise repeat TFTs in 3 months\par \par HCM:\par \par Depression screenin2022 PHQ 9 score 4\par \par Flu shot:   2022\par \par Tdap: 2021\par \par COVID-vaccine: Moderna x 2-COVID booster vaccine advised 3 months after her recent COVID-19 infection\par \par HIV testin2022 negative\par \par Mammogram: Mammogram and breast ultrasound ordered by GYN -she has appointment tomorrow\par \par GYN/PAP: 10/2021 per pt-she states she was recently seen by her GYN\par \par Colon cancer screening: She was referred last visit for screening colonoscopy\par \par F/U 4 weeks for depression follow-up and weight check\par

## 2022-12-07 NOTE — HISTORY OF PRESENT ILLNESS
[de-identified] : Here for follow up of depression\par \par She states she did not get prescription for aripiprazole as she states pharmacy did not get prescriptions.  Prescription was sent 11/22/2022.  She has appt with [facility] tomorrow.  She states today she is feeling better than she was last visit.  She states her kids are very supportive\par \par She states she is interested in starting Wegovy for weight loss.  She states she has a friend who has been using it and has lost weight.  She denies any personal or family history of thyroid cancer or pancreatitis.  She denies history of MEN syndrome.  She states that she feels that her diet is not that bad but cannot lose weight.  She states maybe she eats too much rice

## 2022-12-07 NOTE — PHYSICAL EXAM
[No Acute Distress] : no acute distress [Well-Appearing] : well-appearing [Normal Voice/Communication] : normal voice/communication [Normal Sclera/Conjunctiva] : normal sclera/conjunctiva [PERRL] : pupils equal round and reactive to light [Normal Oropharynx] : the oropharynx was normal [No JVD] : no jugular venous distention [Thyroid Normal, No Nodules] : the thyroid was normal and there were no nodules present [No Respiratory Distress] : no respiratory distress  [No Accessory Muscle Use] : no accessory muscle use [Clear to Auscultation] : lungs were clear to auscultation bilaterally [Normal Rate] : normal rate  [Regular Rhythm] : with a regular rhythm [Normal S1, S2] : normal S1 and S2 [No Murmur] : no murmur heard [No Edema] : there was no peripheral edema [No Extremity Clubbing/Cyanosis] : no extremity clubbing/cyanosis [Soft] : abdomen soft [Non Tender] : non-tender [Non-distended] : non-distended [No Masses] : no abdominal mass palpated [No HSM] : no HSM [Normal Bowel Sounds] : normal bowel sounds [No Spinal Tenderness] : no spinal tenderness [Normal Affect] : the affect was normal [Alert and Oriented x3] : oriented to person, place, and time [Normal Insight/Judgement] : insight and judgment were intact [Normal Mood] : the mood was normal

## 2022-12-07 NOTE — REVIEW OF SYSTEMS
[Depression] : depression [Negative] : Neurological [Fever] : no fever [Chills] : no chills [Chest Pain] : no chest pain [Palpitations] : no palpitations [Lower Ext Edema] : no lower extremity edema [Shortness Of Breath] : no shortness of breath [Wheezing] : no wheezing [Cough] : no cough [Dyspnea on Exertion] : no dyspnea on exertion [Abdominal Pain] : no abdominal pain [Nausea] : no nausea [Diarrhea] : diarrhea [Vomiting] : no vomiting [Suicidal] : not suicidal [Insomnia] : no insomnia [Anxiety] : no anxiety

## 2022-12-11 ENCOUNTER — FORM ENCOUNTER (OUTPATIENT)
Age: 47
End: 2022-12-11

## 2022-12-21 ENCOUNTER — APPOINTMENT (OUTPATIENT)
Dept: ULTRASOUND IMAGING | Facility: CLINIC | Age: 47
End: 2022-12-21

## 2022-12-21 ENCOUNTER — APPOINTMENT (OUTPATIENT)
Dept: MAMMOGRAPHY | Facility: CLINIC | Age: 47
End: 2022-12-21

## 2023-01-06 ENCOUNTER — APPOINTMENT (OUTPATIENT)
Dept: INTERNAL MEDICINE | Facility: CLINIC | Age: 48
End: 2023-01-06
Payer: COMMERCIAL

## 2023-01-06 VITALS
BODY MASS INDEX: 43.43 KG/M2 | HEART RATE: 94 BPM | OXYGEN SATURATION: 97 % | TEMPERATURE: 98.1 F | WEIGHT: 230 LBS | HEIGHT: 61 IN | RESPIRATION RATE: 16 BRPM | DIASTOLIC BLOOD PRESSURE: 86 MMHG | SYSTOLIC BLOOD PRESSURE: 122 MMHG

## 2023-01-06 DIAGNOSIS — R79.89 OTHER SPECIFIED ABNORMAL FINDINGS OF BLOOD CHEMISTRY: ICD-10-CM

## 2023-01-06 PROCEDURE — 99213 OFFICE O/P EST LOW 20 MIN: CPT

## 2023-01-06 NOTE — ASSESSMENT
[FreeTextEntry1] : \par \par Left foot pain/Planter fasciitis\par -She reports she was seen by podiatrist in the left foot injection.  She states she is scheduled for second injection.\par -She states podiatrist also prescribed her a Medrol Dosepak which she recently started\par \par Obesity/Pre-DM:\par -hemoglobin A1c was 6.4 2022\par -low fat/low cholesterol diet, low carbohydrate diet and weight loss advised\par -I have advised that she avoid sugary drinks\par -At last visit I prescribed Wegovy 0.25 mg once weekly x4 weeks and then increase to 0.5 mg once weekly for 4 more weeks\par -She states Wegovy is covered but the pharmacy did not have it in stock and she is awaiting for them to receive.\par -She should follow-up in 2 to 3 months\par \par History of right breast lump/left breast pain:\par -She recently had mammogram and ultrasound which were normal\par -She states GYN advised that she see a breast surgeon and she has appointment\par \par Depression:\par -She has history of severe post partum depression with psychotic features\par -She is currently doing well on Abilify 10 mg at bedtime\par -She states she did speak with behavioral health care manager and states she was was to receive a phone call back regarding getting an appointment to see a psychiatrist.  She states she has not received a phone call.  I will reach out to behavioral health care manager\par -Follow-up in 2 to 3 months\par -She has appointment to speak to behavioral health care manager tomorrow\par \par Vitamin D Deficiency:\par -I advised ergocalciferol 50,000 units once a week for 12 weeks\par -After finishing 12 week course of ergocalciferol, I adv starting over the counter vitamin D 3 1000 units daily\par -I advised repeat vitamin D 25-OH level in 2-3 months\par \par History of anemia/iron deficiency\par -Recent labs showed no anemia\par \par Hypertension:\par -BP at goal today\par \par Elevated TSH\par -TSH was 4.34\par -I advise repeat TFTs in 2-3 months\par \par HCM:\par \par Depression screenin2022 PHQ 9 score 4\par \par Flu shot:   2022\par \par Tdap: 2021\par \par COVID-vaccine: Moderna x 2-COVID booster vaccine advised \par \par HIV testin2022 negative\par \par Mammogram/breast ultrasound 2022\par \par GYN/PAP: 10/2021 per pt-she states she was recently seen by her GYN\par \par Colon cancer screening: She was previously referred to GI for screening colonoscopy\par \par F/U 2 to 3 months for fasting labs\par

## 2023-01-06 NOTE — HISTORY OF PRESENT ILLNESS
[de-identified] : Here today for follow-up of depression and hypertension and obesity\par \par She states she feels well and denies any new complaints

## 2023-01-06 NOTE — PHYSICAL EXAM
[No Acute Distress] : no acute distress [Well-Appearing] : well-appearing [Normal Voice/Communication] : normal voice/communication [Normal Sclera/Conjunctiva] : normal sclera/conjunctiva [PERRL] : pupils equal round and reactive to light [Normal Oropharynx] : the oropharynx was normal [No JVD] : no jugular venous distention [No Respiratory Distress] : no respiratory distress  [No Accessory Muscle Use] : no accessory muscle use [Clear to Auscultation] : lungs were clear to auscultation bilaterally [Normal Rate] : normal rate  [Regular Rhythm] : with a regular rhythm [Normal S1, S2] : normal S1 and S2 [No Murmur] : no murmur heard [No Edema] : there was no peripheral edema [No Extremity Clubbing/Cyanosis] : no extremity clubbing/cyanosis [Normal Affect] : the affect was normal [Alert and Oriented x3] : oriented to person, place, and time [Normal Mood] : the mood was normal [Normal Insight/Judgement] : insight and judgment were intact [No Rash] : no rash

## 2023-01-06 NOTE — REVIEW OF SYSTEMS
[Fever] : no fever [Chills] : no chills [Fatigue] : no fatigue [Chest Pain] : no chest pain [Palpitations] : no palpitations [Lower Ext Edema] : no lower extremity edema [Shortness Of Breath] : no shortness of breath [Wheezing] : no wheezing [Cough] : no cough [Dyspnea on Exertion] : no dyspnea on exertion [Abdominal Pain] : no abdominal pain [Nausea] : no nausea [Diarrhea] : diarrhea [Vomiting] : no vomiting [Suicidal] : not suicidal [Insomnia] : no insomnia [Anxiety] : no anxiety [Depression] : no depression

## 2023-01-09 ENCOUNTER — FORM ENCOUNTER (OUTPATIENT)
Age: 48
End: 2023-01-09

## 2023-01-31 ENCOUNTER — TRANSCRIPTION ENCOUNTER (OUTPATIENT)
Age: 48
End: 2023-01-31

## 2023-02-28 ENCOUNTER — APPOINTMENT (OUTPATIENT)
Dept: INTERNAL MEDICINE | Facility: CLINIC | Age: 48
End: 2023-02-28
Payer: COMMERCIAL

## 2023-02-28 VITALS
BODY MASS INDEX: 43.05 KG/M2 | DIASTOLIC BLOOD PRESSURE: 80 MMHG | RESPIRATION RATE: 16 BRPM | OXYGEN SATURATION: 97 % | TEMPERATURE: 97.8 F | HEIGHT: 61 IN | SYSTOLIC BLOOD PRESSURE: 135 MMHG | WEIGHT: 228 LBS | HEART RATE: 88 BPM

## 2023-02-28 DIAGNOSIS — R20.0 ANESTHESIA OF SKIN: ICD-10-CM

## 2023-02-28 DIAGNOSIS — M54.50 LOW BACK PAIN, UNSPECIFIED: ICD-10-CM

## 2023-02-28 PROCEDURE — 99213 OFFICE O/P EST LOW 20 MIN: CPT | Mod: 25

## 2023-02-28 NOTE — ASSESSMENT
[FreeTextEntry1] :  numbness of both legs:\par Ordered x-ray of lumbar spine, CMP.\par We discussed about physical therapy.  Recommended weight loss.\par \par Vitamin D deficiency:\par Recommended to start taking supplement.\par \par Morbid obesity:\par BMI: 43\par Advised low carb , Mediterranean diet,avoid carbonated beverage , added sugar and sweets.\par exercise min 150 min/wk.\par portion control, maintain a food diary.\par drink plenty of water.\par Patient was started on Wegovy a month ago she tolerated well.  I increased dose to 0.5 mg.\par \par

## 2023-03-02 ENCOUNTER — NON-APPOINTMENT (OUTPATIENT)
Age: 48
End: 2023-03-02

## 2023-03-02 LAB
ALBUMIN SERPL ELPH-MCNC: 4 G/DL
ALP BLD-CCNC: 71 U/L
ALT SERPL-CCNC: 12 U/L
ANION GAP SERPL CALC-SCNC: 11 MMOL/L
AST SERPL-CCNC: 14 U/L
BILIRUB SERPL-MCNC: <0.2 MG/DL
BUN SERPL-MCNC: 12 MG/DL
CALCIUM SERPL-MCNC: 9.7 MG/DL
CHLORIDE SERPL-SCNC: 105 MMOL/L
CO2 SERPL-SCNC: 24 MMOL/L
CREAT SERPL-MCNC: 0.74 MG/DL
EGFR: 102 ML/MIN/1.73M2
GLUCOSE SERPL-MCNC: 96 MG/DL
POTASSIUM SERPL-SCNC: 4.2 MMOL/L
PROT SERPL-MCNC: 7 G/DL
SODIUM SERPL-SCNC: 140 MMOL/L

## 2023-03-15 ENCOUNTER — APPOINTMENT (OUTPATIENT)
Dept: GASTROENTEROLOGY | Facility: CLINIC | Age: 48
End: 2023-03-15

## 2023-04-03 ENCOUNTER — APPOINTMENT (OUTPATIENT)
Dept: INTERNAL MEDICINE | Facility: CLINIC | Age: 48
End: 2023-04-03

## 2023-05-08 NOTE — ED ADULT NURSE NOTE - PAIN RATING/NUMBER SCALE (0-10): ACTIVITY
7
Hpi Title: Evaluation of a Skin Lesion
How Severe Are Your Spot(S)?: mild
Have Your Spot(S) Been Treated In The Past?: has been treated

## 2023-05-23 NOTE — ED STATDOCS - ADMIT DISPOSITION PRESENT ON ADMISSION SEPSIS Q2 - IDEAL BODY WEIGHT USED FOR CRYSTALLOID FLUIDS
Accu Chek Guide strips Pending    Insurance response  Prescription Drug Insurance: [place] Medicaid    Faxed prior authorization request to [phone] for signature. Please sign and fax to [facility] [phone], and they will complete the PA. Please update encounter when faxed to pharmacy. Thank you!            Yes

## 2023-07-26 NOTE — ASSESSMENT
[FreeTextEntry1] : \par Pelvic abscess s/p \par -followed by GYN and told pelvic abscess has resolved\par -she is asymptomatic\par \par Obesity/Pre-DM:\par -hemoglobin A1c was 5.8\par - low fat/low cholesterol diet, low carbohydrate diet and weight loss advised\par \par Right breast lump:\par -She had mammogram and breast ultrasound which revealed benign-appearing right axilla lymph node\par -A follow-up right breast ultrasound advised in 6 months-3/2022\par \par Severe post partum depression with psychotic features:\par -on Abilify\par -seeing psychiatry and therapist\par -she states she is doing very well on medication\par -No SI/HI\par -she will cont to f/u with her psychiatrist\par \par Vitamin D deficiency:\par -vitamin D3 1000 units once a day \par \par Anemia/iron deficiency\par -This is likely due to her menses \par -advised MVI with iron\par -last hemoglobin was 10.8\par -Check CBC again in 2 months\par \par Hypertension:\par -BP at goal on amlodipine 5 mg daily \par \par GERD:\par -on protonix\par \par HCM:\par \par Depression screenin2021 PHQ 9 score 0\par \par Flu shot:  2021\par \par Covid vaccine: Moderna-I advised booster 6 months after second dose\par \par HIV testin2021 negative\par \par Mammogram: 2021 BR 3-needs follow-up right breast ultrasound in 6 months 3/2022\par \par GYN/PAP: 10/2021 per pt\par \par F/U 3 months.  she will have cbc and CMP done prior to next visit.  letter written so she can return to work\par  yes

## 2023-08-09 ENCOUNTER — NON-APPOINTMENT (OUTPATIENT)
Age: 48
End: 2023-08-09

## 2023-08-10 ENCOUNTER — APPOINTMENT (OUTPATIENT)
Dept: BARIATRICS | Facility: CLINIC | Age: 48
End: 2023-08-10
Payer: COMMERCIAL

## 2023-08-10 VITALS
OXYGEN SATURATION: 99 % | SYSTOLIC BLOOD PRESSURE: 126 MMHG | TEMPERATURE: 97 F | BODY MASS INDEX: 44.37 KG/M2 | DIASTOLIC BLOOD PRESSURE: 78 MMHG | HEIGHT: 61 IN | HEART RATE: 86 BPM | WEIGHT: 235 LBS

## 2023-08-10 DIAGNOSIS — Z86.19 PERSONAL HISTORY OF OTHER INFECTIOUS AND PARASITIC DISEASES: ICD-10-CM

## 2023-08-10 PROCEDURE — 99215 OFFICE O/P EST HI 40 MIN: CPT

## 2023-08-10 RX ORDER — ARIPIPRAZOLE 10 MG/1
10 TABLET ORAL
Qty: 90 | Refills: 0 | Status: DISCONTINUED | COMMUNITY
Start: 2022-11-22 | End: 2023-08-10

## 2023-08-10 RX ORDER — ERGOCALCIFEROL 1.25 MG/1
1.25 MG CAPSULE, LIQUID FILLED ORAL
Qty: 12 | Refills: 0 | Status: DISCONTINUED | COMMUNITY
Start: 2022-11-28 | End: 2023-08-10

## 2023-08-10 RX ORDER — SEMAGLUTIDE 0.5 MG/.5ML
0.5 INJECTION, SOLUTION SUBCUTANEOUS
Qty: 4 | Refills: 0 | Status: DISCONTINUED | COMMUNITY
Start: 2022-12-07 | End: 2023-08-10

## 2023-08-10 NOTE — ASSESSMENT
[FreeTextEntry1] : This patient presents today with a BMI of 44.4 kg/m2 and co-morbid conditions including Obstructive Sleep Apnea.    Today we have discussed the Risks, Benefits, and Alternatives to surgical intervention including Laparoscopic Adjustable Gastric Band, Laparoscopic Bernadine-En-Y Gastric Bypass as well as Laparoscopic Sleeve Gastrectomy.  All questions have been answered.  Collectively we have determined this patient to be best suited for Laparoscopic Sleeve Gastrectomy  Risk, Benefits, and Alternatives to surgery have been discussed.  This includes but is not limited to bleeding, infection, damage to adjacent structures, need for additional surgery or interventions, adverse effects of anesthesia such as cardio-respiratory complications, prolonged intubation, cardiac arrhythmia, arrest, and or death.  Risks of forgoing surgery have also been discussed including progression of, and/or worsening of current condition which may then require urgent or emergent treatment or surgery.  This process is quite extensive requiring the patient to undergo preoperative assessments including evaluation and documented weight history by his/her Primary Care Physician, Evaluation and treatment by a Psychiatrist, Nutritionist, Cardiologist, Pulmonologist in addition to preoperative upper endoscopy. In addition, 3-6 consecutive months of medically managed preoperative weight loss counseling will also be required. Throughout this process I anticipate and would expect an EBL (Excess Body Weight Loss) between 10 and 15% prior to surgery.  Nutritional counseling has been provided. The patient is encouraged to remain calorie conscious and continue a low fat, low carbohydrate, high protein diet. Also, emphasized has been placed on the importance of adequate hydration and multi-vitamin supplementation and exercise.  (15 min)  Pending preoperative workup and clearance is in accordance with NIH criteria, this patient will be scheduled electively for Laparoscopic Sleeve Gastrectomy.  Preoperative protocols and referrals have been reviewed at length with the patient and all questions have been answered. Follow-up in one to 2 months to reassess weight loss progression and facilitate any additional preoperative clearances.

## 2023-08-10 NOTE — HISTORY OF PRESENT ILLNESS
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 Despite multiple efforts at diet and exercise this patient has been unable to achieve and/or maintain significant weight loss.  Previously seen in consultation in 2019 for the Sleeve Gastrectomy.  She was scheduled for surgery in Dec 2020 but became pregnant.  She has since had a healthy baby girl and returns today to again pursue having Sleeve Gastrectomy

## 2023-08-10 NOTE — PHYSICAL EXAM
[Obese, well nourished, in no acute distress] : obese, well nourished, in no acute distress [Normal] : affect appropriate [de-identified] : Obese, soft, nontender, nondistended, positive bowel sounds in all four quadrants.  No hernia or masses.

## 2023-08-10 NOTE — CONSULT LETTER
[Dear  ___] : Dear  [unfilled], [Courtesy Letter:] : I had the pleasure of seeing your patient, [unfilled], in my office today. [Please see my note below.] : Please see my note below. [Consult Closing:] : Thank you very much for allowing me to participate in the care of this patient.  If you have any questions, please do not hesitate to contact me. [Sincerely,] : Sincerely, [FreeTextEntry3] : Jan Scott MD, FACS, [facility], Dept of Surgery, [facility] [address] [address] P [phone] F [phone]

## 2023-08-18 ENCOUNTER — NON-APPOINTMENT (OUTPATIENT)
Age: 48
End: 2023-08-18

## 2023-08-18 ENCOUNTER — APPOINTMENT (OUTPATIENT)
Dept: INTERNAL MEDICINE | Facility: CLINIC | Age: 48
End: 2023-08-18
Payer: COMMERCIAL

## 2023-08-18 VITALS
TEMPERATURE: 98 F | BODY MASS INDEX: 44.37 KG/M2 | HEIGHT: 61 IN | DIASTOLIC BLOOD PRESSURE: 81 MMHG | RESPIRATION RATE: 16 BRPM | SYSTOLIC BLOOD PRESSURE: 121 MMHG | WEIGHT: 235 LBS | OXYGEN SATURATION: 99 % | HEART RATE: 78 BPM

## 2023-08-18 PROCEDURE — 99214 OFFICE O/P EST MOD 30 MIN: CPT | Mod: 25

## 2023-08-18 PROCEDURE — 36415 COLL VENOUS BLD VENIPUNCTURE: CPT

## 2023-08-19 LAB
25(OH)D3 SERPL-MCNC: 18.4 NG/ML
ALBUMIN SERPL ELPH-MCNC: 4.2 G/DL
ALP BLD-CCNC: 75 U/L
ALT SERPL-CCNC: 15 U/L
ANION GAP SERPL CALC-SCNC: 15 MMOL/L
AST SERPL-CCNC: 17 U/L
BILIRUB SERPL-MCNC: 0.3 MG/DL
BUN SERPL-MCNC: 9 MG/DL
CALCIUM SERPL-MCNC: 9.5 MG/DL
CHLORIDE SERPL-SCNC: 101 MMOL/L
CHOLEST SERPL-MCNC: 165 MG/DL
CO2 SERPL-SCNC: 24 MMOL/L
CREAT SERPL-MCNC: 0.62 MG/DL
EGFR: 112 ML/MIN/1.73M2
ESTIMATED AVERAGE GLUCOSE: 131 MG/DL
GLUCOSE SERPL-MCNC: 48 MG/DL
HBA1C MFR BLD HPLC: 6.2 %
HCV AB SER QL: NONREACTIVE
HCV S/CO RATIO: 0.1 S/CO
HDLC SERPL-MCNC: 50 MG/DL
HIV1+2 AB SPEC QL IA.RAPID: NONREACTIVE
LDLC SERPL CALC-MCNC: 103 MG/DL
NONHDLC SERPL-MCNC: 116 MG/DL
POTASSIUM SERPL-SCNC: 4 MMOL/L
PROT SERPL-MCNC: 7.4 G/DL
SODIUM SERPL-SCNC: 140 MMOL/L
T4 FREE SERPL-MCNC: 1.1 NG/DL
TRIGL SERPL-MCNC: 65 MG/DL
TSH SERPL-ACNC: 2.94 UIU/ML

## 2023-08-19 NOTE — HISTORY OF PRESENT ILLNESS
[de-identified] : Here today because she recently met with bariatric surgeon.  She states she is interested in pursuing weight loss surgery.  She states she needs a letter of medical necessity for weight loss surgery.  She reports she continues to have difficulty losing weight despite dieting and exercising.  She states she even tried Wegovy for a few months and lost no weight.  She states she has been overweight/obese her entire life.  No new complaints reported today

## 2023-08-19 NOTE — ASSESSMENT
[FreeTextEntry1] :  Left foot pain/Planter fasciitis -She states previously reported symptoms have resolved completely  MorbidObesity/Pre-DM: -hemoglobin A1c was 6.4 2022 -low fat/low cholesterol diet, low carbohydrate diet and weight loss advised -I have advised that she avoid sugary drinks -She took Wegovy for a few months but did not lose any weight -She recently met with bariatric surgeon and is interested in pursuing weight loss surgery -I will write letter of medical necessity  History of right breast lump/left breast pain: -had mammogram and ultrasound which were normal -She states GYN advised that she see a breast surgeon  -She reports she was seen by breast surgeon and was told that her exam was normal.  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 -She states she is currently on no medications -She states she is doing well on no medications -She is to notify office if symptoms recur  Vitamin D Deficiency: -Check vitamin D 25 [place]  History of anemia/iron deficiency -Recent labs showed no anemia -check labs  Hypertension: -BP at goal today  Elevated TSH -check TFTs  HCM:  Depression screenin2023 PHQ 2 score 0  Flu shot:   2022-advised in the  fall  Tdap: 2021  COVID-vaccine: COVID bivalent 2022  HIV testin2022 negative-offered 2023 and she consented to testing  Hepatitis C screening ordered toay  Mammogram/breast ultrasound 2022  GYN/PAP:she was seen by GYN in    Colon cancer screenin2023 normal per pt-I will try to get report  F/U 3 months.  Labs ordered and drawn in office today.  Will write letter of medial necessity for bariatric surgery

## 2023-08-19 NOTE — PHYSICAL EXAM
[No Acute Distress] : no acute distress [Well-Appearing] : well-appearing [Normal Voice/Communication] : normal voice/communication [Normal Sclera/Conjunctiva] : normal sclera/conjunctiva [PERRL] : pupils equal round and reactive to light [Normal Oropharynx] : the oropharynx was normal [No JVD] : no jugular venous distention [No Respiratory Distress] : no respiratory distress  [No Accessory Muscle Use] : no accessory muscle use [Clear to Auscultation] : lungs were clear to auscultation bilaterally [Normal Rate] : normal rate  [Regular Rhythm] : with a regular rhythm [Normal S1, S2] : normal S1 and S2 [No Murmur] : no murmur heard [No Edema] : there was no peripheral edema [No Extremity Clubbing/Cyanosis] : no extremity clubbing/cyanosis [No Rash] : no rash [Normal Affect] : the affect was normal [Alert and Oriented x3] : oriented to person, place, and time [Normal Mood] : the mood was normal [Normal Insight/Judgement] : insight and judgment were intact [No Lymphadenopathy] : no lymphadenopathy [Supple] : supple [Thyroid Normal, No Nodules] : the thyroid was normal and there were no nodules present [Soft] : abdomen soft [Non Tender] : non-tender [Non-distended] : non-distended [No Masses] : no abdominal mass palpated [No HSM] : no HSM [Normal Bowel Sounds] : normal bowel sounds [No Spinal Tenderness] : no spinal tenderness [No Focal Deficits] : no focal deficits

## 2023-08-19 NOTE — HEALTH RISK ASSESSMENT
[PHQ-2 Negative - No further assessment needed] : PHQ-2 Negative - No further assessment needed [0] : 2) Feeling down, depressed, or hopeless: Not at all (0) [Never] : Never [[id number]] : 0

## 2023-08-19 NOTE — REVIEW OF SYSTEMS
[Negative] : Neurological [Fever] : no fever [Chills] : no chills [Fatigue] : no fatigue [Chest Pain] : no chest pain [Palpitations] : no palpitations [Lower Ext Edema] : no lower extremity edema [Shortness Of Breath] : no shortness of breath [Wheezing] : no wheezing [Cough] : no cough [Dyspnea on Exertion] : no dyspnea on exertion [Abdominal Pain] : no abdominal pain [Nausea] : no nausea [Diarrhea] : diarrhea [Vomiting] : no vomiting [Suicidal] : not suicidal [Insomnia] : no insomnia [Anxiety] : no anxiety [Depression] : no depression [de-identified] :   no leg numbness reported

## 2023-08-26 LAB
24R-OH-CALCIDIOL SERPL-MCNC: 70.1 PG/ML
25(OH)D3 SERPL-MCNC: 22.5 NG/ML
ALBUMIN SERPL ELPH-MCNC: 4.1 G/DL
ALP BLD-CCNC: 78 U/L
ALT SERPL-CCNC: 18 U/L
ANION GAP SERPL CALC-SCNC: 12 MMOL/L
APTT BLD: 31.3 SEC
AST SERPL-CCNC: 15 U/L
BILIRUB SERPL-MCNC: 0.2 MG/DL
BUN SERPL-MCNC: 7 MG/DL
CALCIUM SERPL-MCNC: 9.6 MG/DL
CHLORIDE SERPL-SCNC: 102 MMOL/L
CHOLEST SERPL-MCNC: 169 MG/DL
CO2 SERPL-SCNC: 25 MMOL/L
CREAT SERPL-MCNC: 0.54 MG/DL
EGFR: 116 ML/MIN/1.73M2
ESTIMATED AVERAGE GLUCOSE: 143 MG/DL
FOLATE SERPL-MCNC: 9.9 NG/ML
GLUCOSE SERPL-MCNC: 85 MG/DL
HBA1C MFR BLD HPLC: 6.6 %
HCT VFR BLD CALC: 36.8 %
HDLC SERPL-MCNC: 53 MG/DL
HGB BLD-MCNC: 11.5 G/DL
IRON SATN MFR SERPL: 7 %
IRON SERPL-MCNC: 30 UG/DL
LDLC SERPL CALC-MCNC: 105 MG/DL
MCHC RBC-ENTMCNC: 26.2 PG
MCHC RBC-ENTMCNC: 31.3 GM/DL
MCV RBC AUTO: 83.8 FL
NONHDLC SERPL-MCNC: 116 MG/DL
PLATELET # BLD AUTO: 320 K/UL
POTASSIUM SERPL-SCNC: 4 MMOL/L
PROT SERPL-MCNC: 7.5 G/DL
RBC # BLD: 4.39 M/UL
RBC # FLD: 16.9 %
SODIUM SERPL-SCNC: 139 MMOL/L
T3 SERPL-MCNC: 105 NG/DL
T4 SERPL-MCNC: 7.6 UG/DL
TIBC SERPL-MCNC: 416 UG/DL
TRIGL SERPL-MCNC: 58 MG/DL
TSH SERPL-ACNC: 3.85 UIU/ML
UIBC SERPL-MCNC: 386 UG/DL
VIT B12 SERPL-MCNC: 938 PG/ML
WBC # FLD AUTO: 5.27 K/UL

## 2023-08-27 LAB
FERRITIN SERPL-MCNC: 7 NG/ML
GLUCOSE SERPL-MCNC: 82 MG/DL
IRON SATN MFR SERPL: 7 %
IRON SERPL-MCNC: 29 UG/DL
TIBC SERPL-MCNC: 428 UG/DL
UIBC SERPL-MCNC: 400 UG/DL
VIT B12 SERPL-MCNC: 889 PG/ML

## 2023-08-29 ENCOUNTER — APPOINTMENT (OUTPATIENT)
Dept: BARIATRICS/WEIGHT MGMT | Facility: CLINIC | Age: 48
End: 2023-08-29
Payer: COMMERCIAL

## 2023-08-29 VITALS — WEIGHT: 238 LBS | HEIGHT: 61 IN | BODY MASS INDEX: 44.93 KG/M2

## 2023-08-29 PROCEDURE — 90791 PSYCH DIAGNOSTIC EVALUATION: CPT | Mod: 95

## 2023-08-29 NOTE — DISCUSSION/SUMMARY
[Educational materials provided] : Educational materials provided [Behavior plan developed] : Behavior plan developed [Strategies to improve adherence identified] : Strategies to improve adherence identified [Addtnl Health & Behavior Intervention: Group] : Additional Health and Behavior Intervention: Group [Develop and refine illness management to behavior plan] : Develop and refine illness management to behavior plan [Identify, practice refine strategies to promote adherence to regimen] : Identify, practice refine strategies to promote adherence to regimen [FreeTextEntry1] : Based on the information presented in this assessment, Ms. SANCHEZ does not have any current psychological contraindications for bariatric surgery. [de-identified] : Psychological factors (overeating, poor dietary choices) affecting other medical conditions (obesity and associated medical sequelae) Obesity PPD, by hx, In Full Remission [FreeTextEntry3] : Behavioral strategies were discussed to increase her coping skills and assist her in making lifestyle modifications.  Provided psychoeducational materials on changing eating behaviors in preparation for surgery.  It was recommended that she attend the post-surgery group sessions for further education and support to assist her in making lifestyle changes.  Additionally, it was recommended that she utilize writer and RD as needed to assist in making pre-surgical and post-surgical dietary and behavioral changes. [FreeTextEntry5] : compliance with dietary and behavioral recommendations  [FreeTextEntry6] : reduction of obesity related co-morbidities; reduced risk of further medical sequelae of obesity

## 2023-08-29 NOTE — REASON FOR VISIT
[Home] : at home, [unfilled] , at the time of the visit. [Other Location: e.g. Home (Enter Location, City,State)___] : at [unfilled] [Patient] : the patient [Initial Consult] : an initial consult for [Morbid Obesity (BMI>40)] : morbid obesity (bmi>40) [Referring By:  ___] : ~Des Ln~ was referred for psychological evaluation by Dr. HERNANDEZ [Attempted Weight Loss] : attempted weight loss [Commitment to Modified Lifestyle] : commitment to a modified lifestyle pre and post surgery [Difficulties with Diet Compliance] : difficulties with diet compliance  [Expectations of Outcome] : expectations of outcome [Motivation for Selecting Surgery] : motivation for selecting surgery [Strength of Social Support System] : strength of social support system [Patient Understands Data May be Shared] : patient understands that the information discussed during the evaluation would be shared with referring provider and possibly with ~his/her~ insurance provider [de-identified] : for evaluation of psychological appropriateness for bariatric surgery [de-identified] : Confidentiality and its limitations were explained.

## 2023-08-29 NOTE — SOCIAL HISTORY
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

## 2023-08-29 NOTE — CURRENT PSYCHIATRIC SYMPTOMS
[Depressed Mood] : no depressed mood [Anhedonia] : no anhedonia [Decreased Concentration] : no decrease in concentrating ability [Insomnia] : no insomnia disorder [Euphoria] : no euphoria [Dec Need For Sleep] : no decreased need for sleep [Thought Disorder] : ~T a thought disorder was not noted [Excessive Worry] : no excessive worries [Panic] : no panic disorder [de-identified] : hx of PPD (see psyc tx hx) [FreeTextEntry1] : Pt with hx of PPD after birth of her now 2 year old.  PCP prescribed Abilify x 1 year which she stopped in Jan 2023 as symptoms were resolved.  She also saw a therapist during this time x 6 mths.  She had an inpatient psychiatric admission x 1 week in 8/2021. no

## 2023-08-29 NOTE — HISTORY OF PRESENT ILLNESS
[Large Portions] : large portions [Emotional Eating] : emotional eating [Decrease Activity] : decrease activity [Quantity Eating] : quantity eating [Poor Choices] : poor choices [de-identified] : Pt's motivation for surgery is to improve her quality of life, level of energy and health including pre-DM, HTN and FABRICIO. Per pt, her highest weight is her current weight and her lowest weight was 197 lbs in 2021. Her stated goal weight is 160-180 lbs and she expresses intent to make behavioral and dietary changes towards obtaining optimal results. [de-identified] : sleeve gastrectomy [de-identified] : 3 yrs:  speaking with others who have had bariatric surgery; online reading; discussions with surgeon; will be attending nutrition education class; and reading educational materials provided by surgeon [de-identified] : none.  Pt denies ED hx and bxs, including binge eating. 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 [de-identified] : Mounjaro, GOLO, cutting out rice and eating more salads and increased walking.  Despite multiple attempts at diet and exercise modifications, patient reports inability to achieve or maintain weight loss.

## 2023-08-30 LAB
VIT A SERPL-MCNC: 32.9 UG/DL
VIT B1 SERPL-MCNC: 91.1 NMOL/L
VIT B12 USB SERPL-MCNC: 604 PG/ML
VIT B2 SERPL-MCNC: 240 UG/L
VIT B6 SERPL-MCNC: 16.6 UG/L

## 2023-09-18 ENCOUNTER — APPOINTMENT (OUTPATIENT)
Dept: PULMONOLOGY | Facility: CLINIC | Age: 48
End: 2023-09-18
Payer: COMMERCIAL

## 2023-09-18 PROCEDURE — 94010 BREATHING CAPACITY TEST: CPT

## 2023-09-19 ENCOUNTER — APPOINTMENT (OUTPATIENT)
Dept: PULMONOLOGY | Facility: CLINIC | Age: 48
End: 2023-09-19
Payer: COMMERCIAL

## 2023-09-19 VITALS
SYSTOLIC BLOOD PRESSURE: 124 MMHG | DIASTOLIC BLOOD PRESSURE: 74 MMHG | OXYGEN SATURATION: 98 % | HEART RATE: 99 BPM | RESPIRATION RATE: 16 BRPM

## 2023-09-19 PROCEDURE — 99214 OFFICE O/P EST MOD 30 MIN: CPT

## 2023-09-19 RX ORDER — ERGOCALCIFEROL 1.25 MG/1
1.25 MG CAPSULE ORAL
Qty: 12 | Refills: 0 | Status: DISCONTINUED | COMMUNITY
Start: 2023-08-19 | End: 2023-09-19

## 2023-09-26 ENCOUNTER — OUTPATIENT (OUTPATIENT)
Dept: OUTPATIENT SERVICES | Facility: HOSPITAL | Age: 48
LOS: 1 days | End: 2023-09-26
Payer: COMMERCIAL

## 2023-09-26 DIAGNOSIS — O34.219 MATERNAL CARE FOR UNSPECIFIED TYPE SCAR FROM PREVIOUS CESAREAN DELIVERY: Chronic | ICD-10-CM

## 2023-09-26 DIAGNOSIS — G47.33 OBSTRUCTIVE SLEEP APNEA (ADULT) (PEDIATRIC): ICD-10-CM

## 2023-09-26 PROCEDURE — 95800 SLP STDY UNATTENDED: CPT

## 2023-09-26 PROCEDURE — 95800 SLP STDY UNATTENDED: CPT | Mod: 26

## 2023-09-29 ENCOUNTER — APPOINTMENT (OUTPATIENT)
Dept: BARIATRICS/WEIGHT MGMT | Facility: CLINIC | Age: 48
End: 2023-09-29
Payer: COMMERCIAL

## 2023-09-29 PROCEDURE — 97802 MEDICAL NUTRITION INDIV IN: CPT | Mod: 95

## 2023-10-04 VITALS — WEIGHT: 235 LBS | HEIGHT: 61 IN | BODY MASS INDEX: 44.37 KG/M2

## 2023-10-05 ENCOUNTER — NON-APPOINTMENT (OUTPATIENT)
Age: 48
End: 2023-10-05

## 2023-10-05 ENCOUNTER — APPOINTMENT (OUTPATIENT)
Dept: BARIATRICS | Facility: CLINIC | Age: 48
End: 2023-10-05
Payer: COMMERCIAL

## 2023-10-05 VITALS
OXYGEN SATURATION: 99 % | BODY MASS INDEX: 44.18 KG/M2 | HEIGHT: 61 IN | WEIGHT: 234 LBS | HEART RATE: 81 BPM | DIASTOLIC BLOOD PRESSURE: 76 MMHG | TEMPERATURE: 97 F | SYSTOLIC BLOOD PRESSURE: 120 MMHG

## 2023-10-05 DIAGNOSIS — Z92.29 PERSONAL HISTORY OF OTHER DRUG THERAPY: ICD-10-CM

## 2023-10-05 DIAGNOSIS — Z13.220 ENCOUNTER FOR SCREENING FOR LIPOID DISORDERS: ICD-10-CM

## 2023-10-05 DIAGNOSIS — Z86.16 PERSONAL HISTORY OF COVID-19: ICD-10-CM

## 2023-10-05 DIAGNOSIS — Z3A.21 21 WEEKS GESTATION OF PREGNANCY: ICD-10-CM

## 2023-10-05 DIAGNOSIS — Z12.39 ENCOUNTER FOR OTHER SCREENING FOR MALIGNANT NEOPLASM OF BREAST: ICD-10-CM

## 2023-10-05 DIAGNOSIS — O09.529 SUPERVISION OF ELDERLY MULTIGRAVIDA, UNSPECIFIED TRIMESTER: ICD-10-CM

## 2023-10-05 DIAGNOSIS — R06.02 SHORTNESS OF BREATH: ICD-10-CM

## 2023-10-05 DIAGNOSIS — O28.9 UNSPECIFIED ABNORMAL FINDINGS ON ANTENATAL SCREENING OF MOTHER: ICD-10-CM

## 2023-10-05 DIAGNOSIS — G89.18 OTHER ACUTE POSTPROCEDURAL PAIN: ICD-10-CM

## 2023-10-05 DIAGNOSIS — Z34.91 ENCOUNTER FOR SUPERVISION OF NORMAL PREGNANCY, UNSPECIFIED, FIRST TRIMESTER: ICD-10-CM

## 2023-10-05 DIAGNOSIS — Z12.11 ENCOUNTER FOR SCREENING FOR MALIGNANT NEOPLASM OF COLON: ICD-10-CM

## 2023-10-05 DIAGNOSIS — O28.0 ABNORMAL HEMATOLOGICAL FINDING ON ANTENATAL SCREENING OF MOTHER: ICD-10-CM

## 2023-10-05 DIAGNOSIS — N73.9 FEMALE PELVIC INFLAMMATORY DISEASE, UNSPECIFIED: ICD-10-CM

## 2023-10-05 DIAGNOSIS — N64.4 MASTODYNIA: ICD-10-CM

## 2023-10-05 DIAGNOSIS — M72.2 PLANTAR FASCIAL FIBROMATOSIS: ICD-10-CM

## 2023-10-05 DIAGNOSIS — Z12.4 ENCOUNTER FOR SCREENING FOR MALIGNANT NEOPLASM OF CERVIX: ICD-10-CM

## 2023-10-05 DIAGNOSIS — Z3A.30 30 WEEKS GESTATION OF PREGNANCY: ICD-10-CM

## 2023-10-05 DIAGNOSIS — O24.419 GESTATIONAL DIABETES MELLITUS IN PREGNANCY, UNSPECIFIED CONTROL: ICD-10-CM

## 2023-10-05 DIAGNOSIS — Z01.818 ENCOUNTER FOR OTHER PREPROCEDURAL EXAMINATION: ICD-10-CM

## 2023-10-05 DIAGNOSIS — Z13.1 ENCOUNTER FOR SCREENING FOR DIABETES MELLITUS: ICD-10-CM

## 2023-10-05 DIAGNOSIS — O09.899 ABNORMAL HEMATOLOGICAL FINDING ON ANTENATAL SCREENING OF MOTHER: ICD-10-CM

## 2023-10-05 DIAGNOSIS — Z34.92 ENCOUNTER FOR SUPERVISION OF NORMAL PREGNANCY, UNSPECIFIED, SECOND TRIMESTER: ICD-10-CM

## 2023-10-05 DIAGNOSIS — Z3A.35 35 WEEKS GESTATION OF PREGNANCY: ICD-10-CM

## 2023-10-05 DIAGNOSIS — Z11.3 ENCOUNTER FOR SCREENING FOR INFECTIONS WITH A PREDOMINANTLY SEXUAL MODE OF TRANSMISSION: ICD-10-CM

## 2023-10-05 DIAGNOSIS — Z34.93 ENCOUNTER FOR SUPERVISION OF NORMAL PREGNANCY, UNSPECIFIED, THIRD TRIMESTER: ICD-10-CM

## 2023-10-05 DIAGNOSIS — Z3A.26 26 WEEKS GESTATION OF PREGNANCY: ICD-10-CM

## 2023-10-05 DIAGNOSIS — L02.91 CUTANEOUS ABSCESS, UNSPECIFIED: ICD-10-CM

## 2023-10-05 DIAGNOSIS — Z11.4 ENCOUNTER FOR SCREENING FOR HUMAN IMMUNODEFICIENCY VIRUS [HIV]: ICD-10-CM

## 2023-10-05 DIAGNOSIS — Z3A.25 25 WEEKS GESTATION OF PREGNANCY: ICD-10-CM

## 2023-10-05 DIAGNOSIS — Z87.898 PERSONAL HISTORY OF OTHER SPECIFIED CONDITIONS: ICD-10-CM

## 2023-10-05 DIAGNOSIS — Z01.411 ENCOUNTER FOR GYNECOLOGICAL EXAMINATION (GENERAL) (ROUTINE) WITH ABNORMAL FINDINGS: ICD-10-CM

## 2023-10-05 DIAGNOSIS — Z01.811 ENCOUNTER FOR PREPROCEDURAL RESPIRATORY EXAMINATION: ICD-10-CM

## 2023-10-05 DIAGNOSIS — O09.299 SUPERVISION OF PREGNANCY WITH OTHER POOR REPRODUCTIVE OR OBSTETRIC HISTORY, UNSPECIFIED TRIMESTER: ICD-10-CM

## 2023-10-05 DIAGNOSIS — Z3A.34 34 WEEKS GESTATION OF PREGNANCY: ICD-10-CM

## 2023-10-05 PROCEDURE — 99215 OFFICE O/P EST HI 40 MIN: CPT

## 2023-10-06 ENCOUNTER — OUTPATIENT (OUTPATIENT)
Dept: OUTPATIENT SERVICES | Facility: HOSPITAL | Age: 48
LOS: 1 days | End: 2023-10-06
Payer: COMMERCIAL

## 2023-10-06 ENCOUNTER — APPOINTMENT (OUTPATIENT)
Dept: RADIOLOGY | Facility: CLINIC | Age: 48
End: 2023-10-06
Payer: COMMERCIAL

## 2023-10-06 DIAGNOSIS — Z01.811 ENCOUNTER FOR PREPROCEDURAL RESPIRATORY EXAMINATION: ICD-10-CM

## 2023-10-06 DIAGNOSIS — O34.219 MATERNAL CARE FOR UNSPECIFIED TYPE SCAR FROM PREVIOUS CESAREAN DELIVERY: Chronic | ICD-10-CM

## 2023-10-06 DIAGNOSIS — R06.02 SHORTNESS OF BREATH: ICD-10-CM

## 2023-10-06 PROCEDURE — 71046 X-RAY EXAM CHEST 2 VIEWS: CPT | Mod: 26

## 2023-10-06 PROCEDURE — 71046 X-RAY EXAM CHEST 2 VIEWS: CPT

## 2023-10-13 PROBLEM — O28.0 HIGH RISK PREGNANCY WITH LOW PAPPA: Status: RESOLVED | Noted: 2021-05-28 | Resolved: 2023-10-13

## 2023-10-13 PROBLEM — Z12.4 CERVICAL CANCER SCREENING: Status: RESOLVED | Noted: 2020-12-08 | Resolved: 2023-10-13

## 2023-10-13 PROBLEM — R06.02 SOBOE (SHORTNESS OF BREATH ON EXERTION): Status: RESOLVED | Noted: 2020-01-10 | Resolved: 2023-10-13

## 2023-10-13 PROBLEM — O24.419 GESTATIONAL DIABETES MELLITUS (GDM) AFFECTING PREGNANCY, ANTEPARTUM: Status: RESOLVED | Noted: 2021-03-25 | Resolved: 2023-10-13

## 2023-10-13 PROBLEM — O09.299 PREGNANCY WITH HISTORY OF MISCARRIAGE: Status: RESOLVED | Noted: 2020-12-21 | Resolved: 2023-10-13

## 2023-10-13 PROBLEM — Z01.811 PREOP PULMONARY/RESPIRATORY EXAM: Status: RESOLVED | Noted: 2020-01-10 | Resolved: 2023-10-13

## 2023-10-13 PROBLEM — Z01.411 ENCOUNTER FOR GYNECOLOGICAL EXAMINATION (GENERAL) (ROUTINE) WITH ABNORMAL FINDINGS: Status: RESOLVED | Noted: 2020-12-08 | Resolved: 2023-10-13

## 2023-10-13 PROBLEM — Z3A.21 21 WEEKS GESTATION OF PREGNANCY: Status: RESOLVED | Noted: 2021-03-25 | Resolved: 2023-10-13

## 2023-10-13 PROBLEM — Z11.3 SCREENING FOR STD (SEXUALLY TRANSMITTED DISEASE): Status: RESOLVED | Noted: 2020-12-08 | Resolved: 2023-10-13

## 2023-10-13 PROBLEM — Z3A.34 34 WEEKS GESTATION OF PREGNANCY: Status: RESOLVED | Noted: 2020-12-08 | Resolved: 2023-10-13

## 2023-10-13 PROBLEM — M72.2 PLANTAR FASCIITIS, RIGHT: Status: RESOLVED | Noted: 2022-11-22 | Resolved: 2023-10-13

## 2023-10-13 PROBLEM — Z3A.35 35 WEEKS GESTATION OF PREGNANCY: Status: RESOLVED | Noted: 2021-07-02 | Resolved: 2023-10-13

## 2023-10-13 PROBLEM — G89.18 POSTOPERATIVE PAIN: Status: RESOLVED | Noted: 2021-08-02 | Resolved: 2023-10-13

## 2023-10-13 PROBLEM — Z12.11 SCREENING FOR COLON CANCER: Status: RESOLVED | Noted: 2022-11-22 | Resolved: 2023-10-13

## 2023-10-13 PROBLEM — Z3A.26 26 WEEKS GESTATION OF PREGNANCY: Status: RESOLVED | Noted: 2021-05-13 | Resolved: 2023-10-13

## 2023-10-13 PROBLEM — Z92.29 HISTORY OF INFLUENZA VACCINATION: Status: RESOLVED | Noted: 2021-09-28 | Resolved: 2023-10-13

## 2023-10-13 PROBLEM — N73.9 PELVIC ABSCESS IN FEMALE: Status: RESOLVED | Noted: 2021-09-09 | Resolved: 2023-10-13

## 2023-10-13 PROBLEM — O28.9 ABNORMAL FINDING ON ANTENATAL SCREEN: Status: RESOLVED | Noted: 2021-03-25 | Resolved: 2023-10-13

## 2023-10-13 PROBLEM — O09.529 ADVANCED MATERNAL AGE IN MULTIGRAVIDA: Status: RESOLVED | Noted: 2021-03-25 | Resolved: 2023-10-13

## 2023-10-13 PROBLEM — Z12.39 BREAST CANCER SCREENING: Status: RESOLVED | Noted: 2021-09-09 | Resolved: 2023-10-13

## 2023-10-13 PROBLEM — Z34.92 NORMAL PREGNANCY IN SECOND TRIMESTER: Status: RESOLVED | Noted: 2021-03-05 | Resolved: 2023-10-13

## 2023-10-13 PROBLEM — Z3A.30 30 WEEKS GESTATION OF PREGNANCY: Status: RESOLVED | Noted: 2021-05-28 | Resolved: 2023-10-13

## 2023-10-13 PROBLEM — Z34.91 FIRST TRIMESTER PREGNANCY: Status: RESOLVED | Noted: 2019-05-15 | Resolved: 2023-10-13

## 2023-10-13 PROBLEM — L02.91 ABSCESS: Status: RESOLVED | Noted: 2021-08-09 | Resolved: 2023-10-13

## 2023-10-13 PROBLEM — Z11.4 SCREENING FOR HIV (HUMAN IMMUNODEFICIENCY VIRUS): Status: RESOLVED | Noted: 2019-06-12 | Resolved: 2023-10-13

## 2023-10-13 PROBLEM — Z13.1 SCREENING FOR DIABETES MELLITUS (DM): Status: RESOLVED | Noted: 2019-06-12 | Resolved: 2023-10-13

## 2023-10-13 PROBLEM — Z34.93 THIRD TRIMESTER PREGNANCY: Status: RESOLVED | Noted: 2021-07-02 | Resolved: 2023-10-13

## 2023-10-13 PROBLEM — Z13.220 SCREENING FOR HYPERLIPIDEMIA: Status: RESOLVED | Noted: 2019-06-12 | Resolved: 2023-10-13

## 2023-10-13 PROBLEM — Z87.898 HISTORY OF BREAST LUMP: Status: RESOLVED | Noted: 2021-09-09 | Resolved: 2023-10-13

## 2023-10-13 PROBLEM — Z86.16 HISTORY OF COVID-19: Status: RESOLVED | Noted: 2023-09-19 | Resolved: 2023-10-13

## 2023-10-13 PROBLEM — Z3A.25 25 WEEKS GESTATION OF PREGNANCY: Status: RESOLVED | Noted: 2021-04-22 | Resolved: 2023-10-13

## 2023-10-13 PROBLEM — N64.4 BREAST PAIN, LEFT: Status: RESOLVED | Noted: 2022-11-24 | Resolved: 2023-10-13

## 2023-10-13 PROBLEM — Z01.818 PRE-OP EVALUATION: Status: RESOLVED | Noted: 2020-01-31 | Resolved: 2023-10-13

## 2023-10-20 ENCOUNTER — APPOINTMENT (OUTPATIENT)
Dept: PULMONOLOGY | Facility: CLINIC | Age: 48
End: 2023-10-20
Payer: COMMERCIAL

## 2023-10-20 VITALS
OXYGEN SATURATION: 98 % | HEIGHT: 61 IN | BODY MASS INDEX: 44.18 KG/M2 | SYSTOLIC BLOOD PRESSURE: 134 MMHG | RESPIRATION RATE: 16 BRPM | HEART RATE: 87 BPM | WEIGHT: 234 LBS | DIASTOLIC BLOOD PRESSURE: 72 MMHG

## 2023-10-20 DIAGNOSIS — R94.2 ABNORMAL RESULTS OF PULMONARY FUNCTION STUDIES: ICD-10-CM

## 2023-10-20 PROCEDURE — 99214 OFFICE O/P EST MOD 30 MIN: CPT

## 2023-10-25 ENCOUNTER — OUTPATIENT (OUTPATIENT)
Dept: OUTPATIENT SERVICES | Facility: HOSPITAL | Age: 48
LOS: 1 days | End: 2023-10-25
Payer: COMMERCIAL

## 2023-10-25 VITALS
DIASTOLIC BLOOD PRESSURE: 75 MMHG | WEIGHT: 232.81 LBS | OXYGEN SATURATION: 100 % | TEMPERATURE: 97 F | HEIGHT: 62 IN | RESPIRATION RATE: 14 BRPM | HEART RATE: 75 BPM | SYSTOLIC BLOOD PRESSURE: 116 MMHG

## 2023-10-25 DIAGNOSIS — Z98.891 HISTORY OF UTERINE SCAR FROM PREVIOUS SURGERY: Chronic | ICD-10-CM

## 2023-10-25 DIAGNOSIS — E66.01 MORBID (SEVERE) OBESITY DUE TO EXCESS CALORIES: ICD-10-CM

## 2023-10-25 DIAGNOSIS — Z98.51 TUBAL LIGATION STATUS: Chronic | ICD-10-CM

## 2023-10-25 DIAGNOSIS — Z01.818 ENCOUNTER FOR OTHER PREPROCEDURAL EXAMINATION: ICD-10-CM

## 2023-10-25 DIAGNOSIS — O34.219 MATERNAL CARE FOR UNSPECIFIED TYPE SCAR FROM PREVIOUS CESAREAN DELIVERY: Chronic | ICD-10-CM

## 2023-10-25 LAB
A1C WITH ESTIMATED AVERAGE GLUCOSE RESULT: 6.5 % — HIGH (ref 4–5.6)
A1C WITH ESTIMATED AVERAGE GLUCOSE RESULT: 6.5 % — HIGH (ref 4–5.6)
ALBUMIN SERPL ELPH-MCNC: 3.5 G/DL — SIGNIFICANT CHANGE UP (ref 3.3–5)
ALBUMIN SERPL ELPH-MCNC: 3.5 G/DL — SIGNIFICANT CHANGE UP (ref 3.3–5)
ALP SERPL-CCNC: 66 U/L — SIGNIFICANT CHANGE UP (ref 30–120)
ALP SERPL-CCNC: 66 U/L — SIGNIFICANT CHANGE UP (ref 30–120)
ALT FLD-CCNC: 18 U/L — SIGNIFICANT CHANGE UP (ref 10–60)
ALT FLD-CCNC: 18 U/L — SIGNIFICANT CHANGE UP (ref 10–60)
ANION GAP SERPL CALC-SCNC: 3 MMOL/L — LOW (ref 5–17)
ANION GAP SERPL CALC-SCNC: 3 MMOL/L — LOW (ref 5–17)
AST SERPL-CCNC: 13 U/L — SIGNIFICANT CHANGE UP (ref 10–40)
AST SERPL-CCNC: 13 U/L — SIGNIFICANT CHANGE UP (ref 10–40)
BILIRUB SERPL-MCNC: 0.2 MG/DL — SIGNIFICANT CHANGE UP (ref 0.2–1.2)
BILIRUB SERPL-MCNC: 0.2 MG/DL — SIGNIFICANT CHANGE UP (ref 0.2–1.2)
BLD GP AB SCN SERPL QL: SIGNIFICANT CHANGE UP
BLD GP AB SCN SERPL QL: SIGNIFICANT CHANGE UP
BUN SERPL-MCNC: 11 MG/DL — SIGNIFICANT CHANGE UP (ref 7–23)
BUN SERPL-MCNC: 11 MG/DL — SIGNIFICANT CHANGE UP (ref 7–23)
CALCIUM SERPL-MCNC: 9.5 MG/DL — SIGNIFICANT CHANGE UP (ref 8.4–10.5)
CALCIUM SERPL-MCNC: 9.5 MG/DL — SIGNIFICANT CHANGE UP (ref 8.4–10.5)
CHLORIDE SERPL-SCNC: 104 MMOL/L — SIGNIFICANT CHANGE UP (ref 96–108)
CHLORIDE SERPL-SCNC: 104 MMOL/L — SIGNIFICANT CHANGE UP (ref 96–108)
CO2 SERPL-SCNC: 31 MMOL/L — SIGNIFICANT CHANGE UP (ref 22–31)
CO2 SERPL-SCNC: 31 MMOL/L — SIGNIFICANT CHANGE UP (ref 22–31)
CREAT SERPL-MCNC: 0.71 MG/DL — SIGNIFICANT CHANGE UP (ref 0.5–1.3)
CREAT SERPL-MCNC: 0.71 MG/DL — SIGNIFICANT CHANGE UP (ref 0.5–1.3)
EGFR: 106 ML/MIN/1.73M2 — SIGNIFICANT CHANGE UP
EGFR: 106 ML/MIN/1.73M2 — SIGNIFICANT CHANGE UP
ESTIMATED AVERAGE GLUCOSE: 140 MG/DL — HIGH (ref 68–114)
ESTIMATED AVERAGE GLUCOSE: 140 MG/DL — HIGH (ref 68–114)
GLUCOSE SERPL-MCNC: 104 MG/DL — HIGH (ref 70–99)
GLUCOSE SERPL-MCNC: 104 MG/DL — HIGH (ref 70–99)
HCT VFR BLD CALC: 37.8 % — SIGNIFICANT CHANGE UP (ref 34.5–45)
HCT VFR BLD CALC: 37.8 % — SIGNIFICANT CHANGE UP (ref 34.5–45)
HGB BLD-MCNC: 11.8 G/DL — SIGNIFICANT CHANGE UP (ref 11.5–15.5)
HGB BLD-MCNC: 11.8 G/DL — SIGNIFICANT CHANGE UP (ref 11.5–15.5)
MCHC RBC-ENTMCNC: 25.5 PG — LOW (ref 27–34)
MCHC RBC-ENTMCNC: 25.5 PG — LOW (ref 27–34)
MCHC RBC-ENTMCNC: 31.2 GM/DL — LOW (ref 32–36)
MCHC RBC-ENTMCNC: 31.2 GM/DL — LOW (ref 32–36)
MCV RBC AUTO: 81.8 FL — SIGNIFICANT CHANGE UP (ref 80–100)
MCV RBC AUTO: 81.8 FL — SIGNIFICANT CHANGE UP (ref 80–100)
NRBC # BLD: 0 /100 WBCS — SIGNIFICANT CHANGE UP (ref 0–0)
NRBC # BLD: 0 /100 WBCS — SIGNIFICANT CHANGE UP (ref 0–0)
PLATELET # BLD AUTO: 330 K/UL — SIGNIFICANT CHANGE UP (ref 150–400)
PLATELET # BLD AUTO: 330 K/UL — SIGNIFICANT CHANGE UP (ref 150–400)
POTASSIUM SERPL-MCNC: 4.1 MMOL/L — SIGNIFICANT CHANGE UP (ref 3.5–5.3)
POTASSIUM SERPL-MCNC: 4.1 MMOL/L — SIGNIFICANT CHANGE UP (ref 3.5–5.3)
POTASSIUM SERPL-SCNC: 4.1 MMOL/L — SIGNIFICANT CHANGE UP (ref 3.5–5.3)
POTASSIUM SERPL-SCNC: 4.1 MMOL/L — SIGNIFICANT CHANGE UP (ref 3.5–5.3)
PROT SERPL-MCNC: 7.9 G/DL — SIGNIFICANT CHANGE UP (ref 6–8.3)
PROT SERPL-MCNC: 7.9 G/DL — SIGNIFICANT CHANGE UP (ref 6–8.3)
RBC # BLD: 4.62 M/UL — SIGNIFICANT CHANGE UP (ref 3.8–5.2)
RBC # BLD: 4.62 M/UL — SIGNIFICANT CHANGE UP (ref 3.8–5.2)
RBC # FLD: 15.9 % — HIGH (ref 10.3–14.5)
RBC # FLD: 15.9 % — HIGH (ref 10.3–14.5)
SODIUM SERPL-SCNC: 138 MMOL/L — SIGNIFICANT CHANGE UP (ref 135–145)
SODIUM SERPL-SCNC: 138 MMOL/L — SIGNIFICANT CHANGE UP (ref 135–145)
WBC # BLD: 4.52 K/UL — SIGNIFICANT CHANGE UP (ref 3.8–10.5)
WBC # BLD: 4.52 K/UL — SIGNIFICANT CHANGE UP (ref 3.8–10.5)
WBC # FLD AUTO: 4.52 K/UL — SIGNIFICANT CHANGE UP (ref 3.8–10.5)
WBC # FLD AUTO: 4.52 K/UL — SIGNIFICANT CHANGE UP (ref 3.8–10.5)

## 2023-10-25 PROCEDURE — G0463: CPT

## 2023-10-25 RX ORDER — LEVOTHYROXINE SODIUM 125 MCG
0 TABLET ORAL
Qty: 0 | Refills: 0 | DISCHARGE

## 2023-10-25 RX ORDER — OMEPRAZOLE 20 MG/1
20 CAPSULE, DELAYED RELEASE ORAL DAILY
Qty: 30 | Refills: 0 | Status: COMPLETED | COMMUNITY
Start: 2023-10-25 | End: 2023-11-24

## 2023-10-25 NOTE — H&P PST ADULT - COMMENTS
history of covid February 2022, mild symptoms and no treatment  denies any current cold or flu like symptoms, including fever, cough, sinus congestion, body aches or chills  hypothyroid was only during pregnancy 2 years ago and off medication since post partum

## 2023-10-25 NOTE — H&P PST ADULT - NSICDXPASTMEDICALHX_GEN_ALL_CORE_FT
PAST MEDICAL HISTORY:  Back pain intermittent pain; not on standing pain medication    History of COVID-19     History of hypothyroidism     History of vitamin D deficiency     Mild obstructive sleep apnea     Morbid obesity with BMI of 40.0-44.9, adult     FABRICIO on CPAP     Prediabetes

## 2023-10-25 NOTE — H&P PST ADULT - HISTORY OF PRESENT ILLNESS
49 yo female presents for elective sleeve gastrectomy. She was scheduled to have this surgery 2 years ago but postponed after getting pregnant.

## 2023-10-25 NOTE — H&P PST ADULT - PROBLEM SELECTOR PLAN 1
laparoscopic sleeve gastrectomy with upper endoscopy. medical and cardiac clearances requested. scheduled for stress and echo, obtain reports. surgical wash instructions reviewed and verbalized understanding. NPO after midnight

## 2023-10-31 ENCOUNTER — NON-APPOINTMENT (OUTPATIENT)
Age: 48
End: 2023-10-31

## 2023-10-31 ENCOUNTER — APPOINTMENT (OUTPATIENT)
Dept: INTERNAL MEDICINE | Facility: CLINIC | Age: 48
End: 2023-10-31
Payer: COMMERCIAL

## 2023-10-31 VITALS
SYSTOLIC BLOOD PRESSURE: 127 MMHG | TEMPERATURE: 98 F | HEART RATE: 79 BPM | HEIGHT: 61 IN | BODY MASS INDEX: 44.18 KG/M2 | WEIGHT: 234 LBS | OXYGEN SATURATION: 98 % | RESPIRATION RATE: 15 BRPM | DIASTOLIC BLOOD PRESSURE: 84 MMHG

## 2023-10-31 DIAGNOSIS — E55.9 VITAMIN D DEFICIENCY, UNSPECIFIED: ICD-10-CM

## 2023-10-31 DIAGNOSIS — Z01.818 ENCOUNTER FOR OTHER PREPROCEDURAL EXAMINATION: ICD-10-CM

## 2023-10-31 DIAGNOSIS — E66.9 OBESITY, UNSPECIFIED: ICD-10-CM

## 2023-10-31 PROCEDURE — 99214 OFFICE O/P EST MOD 30 MIN: CPT

## 2023-11-06 RX ORDER — ACETAMINOPHEN 500 MG
1000 TABLET ORAL ONCE
Refills: 0 | Status: DISCONTINUED | OUTPATIENT
Start: 2023-11-08 | End: 2023-11-09

## 2023-11-07 ENCOUNTER — TRANSCRIPTION ENCOUNTER (OUTPATIENT)
Age: 48
End: 2023-11-07

## 2023-11-07 RX ORDER — SODIUM CHLORIDE 9 MG/ML
1000 INJECTION, SOLUTION INTRAVENOUS
Refills: 0 | Status: DISCONTINUED | OUTPATIENT
Start: 2023-11-08 | End: 2023-11-08

## 2023-11-07 RX ORDER — METOCLOPRAMIDE HCL 10 MG
5 TABLET ORAL ONCE
Refills: 0 | Status: DISCONTINUED | OUTPATIENT
Start: 2023-11-08 | End: 2023-11-08

## 2023-11-07 RX ORDER — HYDROMORPHONE HYDROCHLORIDE 2 MG/ML
0.5 INJECTION INTRAMUSCULAR; INTRAVENOUS; SUBCUTANEOUS
Refills: 0 | Status: DISCONTINUED | OUTPATIENT
Start: 2023-11-08 | End: 2023-11-08

## 2023-11-08 ENCOUNTER — INPATIENT (INPATIENT)
Facility: HOSPITAL | Age: 48
LOS: 0 days | Discharge: HOME | DRG: 621 | End: 2023-11-09
Attending: SURGERY | Admitting: SURGERY
Payer: COMMERCIAL

## 2023-11-08 ENCOUNTER — TRANSCRIPTION ENCOUNTER (OUTPATIENT)
Age: 48
End: 2023-11-08

## 2023-11-08 ENCOUNTER — APPOINTMENT (OUTPATIENT)
Dept: SURGERY | Facility: HOSPITAL | Age: 48
End: 2023-11-08

## 2023-11-08 VITALS
DIASTOLIC BLOOD PRESSURE: 66 MMHG | OXYGEN SATURATION: 98 % | WEIGHT: 233.03 LBS | HEIGHT: 63 IN | HEART RATE: 84 BPM | TEMPERATURE: 98 F | RESPIRATION RATE: 11 BRPM | SYSTOLIC BLOOD PRESSURE: 115 MMHG

## 2023-11-08 DIAGNOSIS — E66.01 MORBID (SEVERE) OBESITY DUE TO EXCESS CALORIES: ICD-10-CM

## 2023-11-08 DIAGNOSIS — Z86.39 PERSONAL HISTORY OF OTHER ENDOCRINE, NUTRITIONAL AND METABOLIC DISEASE: ICD-10-CM

## 2023-11-08 DIAGNOSIS — Z98.891 HISTORY OF UTERINE SCAR FROM PREVIOUS SURGERY: Chronic | ICD-10-CM

## 2023-11-08 DIAGNOSIS — Z98.51 TUBAL LIGATION STATUS: Chronic | ICD-10-CM

## 2023-11-08 DIAGNOSIS — G47.33 OBSTRUCTIVE SLEEP APNEA (ADULT) (PEDIATRIC): ICD-10-CM

## 2023-11-08 LAB
HCG UR QL: NEGATIVE — SIGNIFICANT CHANGE UP
HCG UR QL: NEGATIVE — SIGNIFICANT CHANGE UP

## 2023-11-08 PROCEDURE — 43281 LAP PARAESOPHAG HERN REPAIR: CPT | Mod: 59

## 2023-11-08 PROCEDURE — 43775 LAP SLEEVE GASTRECTOMY: CPT | Mod: AS

## 2023-11-08 PROCEDURE — 43775 LAP SLEEVE GASTRECTOMY: CPT

## 2023-11-08 PROCEDURE — 88307 TISSUE EXAM BY PATHOLOGIST: CPT | Mod: 26

## 2023-11-08 DEVICE — STAPLER COVIDIEN TRI-STAPLE 60MM BLACK INTELLIGENT RELOAD: Type: IMPLANTABLE DEVICE | Status: FUNCTIONAL

## 2023-11-08 DEVICE — VISTASEAL FIBRIN HUMAN 10ML: Type: IMPLANTABLE DEVICE | Status: FUNCTIONAL

## 2023-11-08 DEVICE — STAPLER COVIDIEN TRI-STAPLE 60MM PURPLE INTELLIGENT RELOAD: Type: IMPLANTABLE DEVICE | Status: FUNCTIONAL

## 2023-11-08 RX ORDER — INFLUENZA VIRUS VACCINE 15; 15; 15; 15 UG/.5ML; UG/.5ML; UG/.5ML; UG/.5ML
0.5 SUSPENSION INTRAMUSCULAR ONCE
Refills: 0 | Status: DISCONTINUED | OUTPATIENT
Start: 2023-11-08 | End: 2023-11-09

## 2023-11-08 RX ORDER — SODIUM CHLORIDE 9 MG/ML
2000 INJECTION INTRAMUSCULAR; INTRAVENOUS; SUBCUTANEOUS
Refills: 0 | Status: DISCONTINUED | OUTPATIENT
Start: 2023-11-08 | End: 2023-11-08

## 2023-11-08 RX ORDER — OMEPRAZOLE 10 MG/1
1 CAPSULE, DELAYED RELEASE ORAL
Qty: 0 | Refills: 0 | DISCHARGE

## 2023-11-08 RX ORDER — HYDROMORPHONE HYDROCHLORIDE 2 MG/ML
0.5 INJECTION INTRAMUSCULAR; INTRAVENOUS; SUBCUTANEOUS EVERY 4 HOURS
Refills: 0 | Status: DISCONTINUED | OUTPATIENT
Start: 2023-11-08 | End: 2023-11-09

## 2023-11-08 RX ORDER — ENOXAPARIN SODIUM 100 MG/ML
40 INJECTION SUBCUTANEOUS ONCE
Refills: 0 | Status: COMPLETED | OUTPATIENT
Start: 2023-11-08 | End: 2023-11-08

## 2023-11-08 RX ORDER — SODIUM CHLORIDE 9 MG/ML
1000 INJECTION, SOLUTION INTRAVENOUS
Refills: 0 | Status: DISCONTINUED | OUTPATIENT
Start: 2023-11-08 | End: 2023-11-09

## 2023-11-08 RX ORDER — OXYCODONE HYDROCHLORIDE 5 MG/1
5 TABLET ORAL EVERY 4 HOURS
Refills: 0 | Status: DISCONTINUED | OUTPATIENT
Start: 2023-11-08 | End: 2023-11-09

## 2023-11-08 RX ORDER — ACETAMINOPHEN 500 MG
1000 TABLET ORAL ONCE
Refills: 0 | Status: COMPLETED | OUTPATIENT
Start: 2023-11-08 | End: 2023-11-08

## 2023-11-08 RX ORDER — CEFAZOLIN SODIUM 1 G
2000 VIAL (EA) INJECTION ONCE
Refills: 0 | Status: DISCONTINUED | OUTPATIENT
Start: 2023-11-08 | End: 2023-11-08

## 2023-11-08 RX ORDER — CEFAZOLIN SODIUM 1 G
2000 VIAL (EA) INJECTION ONCE
Refills: 0 | Status: COMPLETED | OUTPATIENT
Start: 2023-11-08 | End: 2023-11-08

## 2023-11-08 RX ORDER — ACETAMINOPHEN 500 MG
1000 TABLET ORAL EVERY 6 HOURS
Refills: 0 | Status: DISCONTINUED | OUTPATIENT
Start: 2023-11-09 | End: 2023-11-09

## 2023-11-08 RX ORDER — IBUPROFEN 200 MG
800 TABLET ORAL EVERY 6 HOURS
Refills: 0 | Status: DISCONTINUED | OUTPATIENT
Start: 2023-11-08 | End: 2023-11-09

## 2023-11-08 RX ORDER — SIMETHICONE 80 MG/1
1 TABLET, CHEWABLE ORAL
Qty: 0 | Refills: 0 | DISCHARGE

## 2023-11-08 RX ORDER — ACETAMINOPHEN 500 MG
1000 TABLET ORAL
Qty: 0 | Refills: 0 | DISCHARGE

## 2023-11-08 RX ORDER — ONDANSETRON 8 MG/1
4 TABLET, FILM COATED ORAL EVERY 6 HOURS
Refills: 0 | Status: DISCONTINUED | OUTPATIENT
Start: 2023-11-08 | End: 2023-11-09

## 2023-11-08 RX ORDER — OXYCODONE HYDROCHLORIDE 5 MG/1
1 TABLET ORAL
Qty: 0 | Refills: 0 | DISCHARGE

## 2023-11-08 RX ORDER — ONDANSETRON 8 MG/1
1 TABLET, FILM COATED ORAL
Qty: 0 | Refills: 0 | DISCHARGE

## 2023-11-08 RX ORDER — PANTOPRAZOLE SODIUM 20 MG/1
40 TABLET, DELAYED RELEASE ORAL DAILY
Refills: 0 | Status: DISCONTINUED | OUTPATIENT
Start: 2023-11-08 | End: 2023-11-09

## 2023-11-08 RX ORDER — SODIUM CHLORIDE 9 MG/ML
1000 INJECTION, SOLUTION INTRAVENOUS
Refills: 0 | Status: DISCONTINUED | OUTPATIENT
Start: 2023-11-08 | End: 2023-11-08

## 2023-11-08 RX ORDER — CHLORHEXIDINE GLUCONATE 213 G/1000ML
1 SOLUTION TOPICAL ONCE
Refills: 0 | Status: COMPLETED | OUTPATIENT
Start: 2023-11-08 | End: 2023-11-08

## 2023-11-08 RX ORDER — APREPITANT 80 MG/1
40 CAPSULE ORAL ONCE
Refills: 0 | Status: COMPLETED | OUTPATIENT
Start: 2023-11-08 | End: 2023-11-08

## 2023-11-08 RX ORDER — ACETAMINOPHEN 500 MG
1000 TABLET ORAL ONCE
Refills: 0 | Status: COMPLETED | OUTPATIENT
Start: 2023-11-09 | End: 2023-11-09

## 2023-11-08 RX ORDER — HYOSCYAMINE SULFATE 0.13 MG
0.12 TABLET ORAL EVERY 6 HOURS
Refills: 0 | Status: DISCONTINUED | OUTPATIENT
Start: 2023-11-08 | End: 2023-11-09

## 2023-11-08 RX ORDER — ENOXAPARIN SODIUM 100 MG/ML
40 INJECTION SUBCUTANEOUS EVERY 24 HOURS
Refills: 0 | Status: DISCONTINUED | OUTPATIENT
Start: 2023-11-09 | End: 2023-11-09

## 2023-11-08 RX ORDER — SIMETHICONE 80 MG/1
80 TABLET, CHEWABLE ORAL EVERY 8 HOURS
Refills: 0 | Status: DISCONTINUED | OUTPATIENT
Start: 2023-11-08 | End: 2023-11-09

## 2023-11-08 RX ORDER — CHOLECALCIFEROL (VITAMIN D3) 125 MCG
1 CAPSULE ORAL
Refills: 0 | DISCHARGE

## 2023-11-08 RX ADMIN — Medication 200 MILLIGRAM(S): at 12:01

## 2023-11-08 RX ADMIN — ONDANSETRON 4 MILLIGRAM(S): 8 TABLET, FILM COATED ORAL at 17:52

## 2023-11-08 RX ADMIN — Medication 400 MILLIGRAM(S): at 21:46

## 2023-11-08 RX ADMIN — ENOXAPARIN SODIUM 40 MILLIGRAM(S): 100 INJECTION SUBCUTANEOUS at 07:11

## 2023-11-08 RX ADMIN — Medication 1000 MILLIGRAM(S): at 17:44

## 2023-11-08 RX ADMIN — SODIUM CHLORIDE 1000 MILLILITER(S): 9 INJECTION INTRAMUSCULAR; INTRAVENOUS; SUBCUTANEOUS at 07:18

## 2023-11-08 RX ADMIN — Medication 400 MILLIGRAM(S): at 15:51

## 2023-11-08 RX ADMIN — HYDROMORPHONE HYDROCHLORIDE 0.5 MILLIGRAM(S): 2 INJECTION INTRAMUSCULAR; INTRAVENOUS; SUBCUTANEOUS at 11:08

## 2023-11-08 RX ADMIN — APREPITANT 40 MILLIGRAM(S): 80 CAPSULE ORAL at 07:11

## 2023-11-08 RX ADMIN — SODIUM CHLORIDE 150 MILLILITER(S): 9 INJECTION, SOLUTION INTRAVENOUS at 23:48

## 2023-11-08 RX ADMIN — Medication 1000 MILLIGRAM(S): at 22:15

## 2023-11-08 RX ADMIN — CHLORHEXIDINE GLUCONATE 1 APPLICATION(S): 213 SOLUTION TOPICAL at 07:19

## 2023-11-08 RX ADMIN — SODIUM CHLORIDE 125 MILLILITER(S): 9 INJECTION, SOLUTION INTRAVENOUS at 11:03

## 2023-11-08 RX ADMIN — HYDROMORPHONE HYDROCHLORIDE 0.5 MILLIGRAM(S): 2 INJECTION INTRAMUSCULAR; INTRAVENOUS; SUBCUTANEOUS at 11:23

## 2023-11-08 NOTE — CONSULT NOTE ADULT - ASSESSMENT
Pt. stable postop gastric sleeve.  Hx FABRICIO on cpap hs.  Suggest ambulate, incentive tex, dvt prophylaxis.  Fu Dr. Rizo after dc.

## 2023-11-08 NOTE — BRIEF OPERATIVE NOTE - NSICDXBRIEFPROCEDURE_GEN_ALL_CORE_FT
PROCEDURES:  Laparoscopic sleeve gastrectomy with laparoscopic repair of hiatal hernia if indicated 08-Nov-2023 11:13:25  Blanca Mna  EGD, intraoperative 08-Nov-2023 11:14:00  Blanca Man

## 2023-11-08 NOTE — BRIEF OPERATIVE NOTE - OPERATION/FINDINGS
Patient underwent laparoscopic sleeve gastrectomy over 36 [place] bougie and primary repair of hiatal hernia without any complications. Intraoperative EGD confirmed intact nonbleeding staple line with negative leak test. Vistaseal applied for hemostasis.

## 2023-11-08 NOTE — PROVIDER CONTACT NOTE (OTHER) - SITUATION
patient called RN to inform that she felt nauseous but did not vomit which went away shortly after. Patient denied needing any nausea medication at this time.

## 2023-11-08 NOTE — DISCHARGE NOTE PROVIDER - HOSPITAL COURSE
47 y/o female underwent laparoscopic sleeve gastrectomy with hiatal hernia repair. Patient tolerated procedure well and progressed appropriately. Currently tolerating Bariatric Phase 1 Clears diet, voiding independently with appropriate volume and ambulating. Nutritional guidelines were reviewed with Nutritionist and patient instructed to drink small frequent amounts, start protein drinks and follow dietary guidelines.  Discharged on 11/9/23 with home medications, incentive spirometer and Meds to Beds prescriptions. Follow-up with Bariatric Surgeon/Dr. RAFAEL Scott in 1 week.

## 2023-11-08 NOTE — BRIEF OPERATIVE NOTE - NSICDXBRIEFPOSTOP_GEN_ALL_CORE_FT
POST-OP DIAGNOSIS:  Morbid obesity 08-Nov-2023 11:14:45  Blanca Man  Hiatal hernia 08-Nov-2023 11:14:56  Blanca Man  
ingestion

## 2023-11-08 NOTE — DISCHARGE NOTE PROVIDER - NSDCMRMEDTOKEN_GEN_ALL_CORE_FT
acetaminophen 500 mg/5 mL oral liquid: 1,000 milligram(s) orally every 6 hours  Bariatric Fusion Mixed [place] Complete oral tablet, chewable: 1 orally once a day  cholecalciferol 1250 mcg (50,000 intl units) oral capsule: 1 cap(s) orally once a week Wednesday  omeprazole 20 mg oral delayed release capsule: 1 cap(s) orally once a day  ondansetron 4 mg oral tablet, disintegratin tab(s) orally every 6 hours as needed for  nausea  oxyCODONE 5 mg oral tablet: 1 tab(s) orally every 6 hours as needed for  severe pain  simethicone 80 mg oral tablet, chewable: 1 tab(s) chewed 3 times a day as needed for  gas pain

## 2023-11-08 NOTE — PATIENT PROFILE ADULT - FALL HARM RISK - UNIVERSAL INTERVENTIONS
Bed in lowest position, wheels locked, appropriate side rails in place/Call bell, personal items and telephone in reach/Instruct patient to call for assistance before getting out of bed or chair/Non-slip footwear when patient is out of bed/[place] to call system/Physically safe environment - no spills, clutter or unnecessary equipment/Purposeful Proactive Rounding/Room/bathroom lighting operational, light cord in reach

## 2023-11-08 NOTE — DISCHARGE NOTE PROVIDER - CARE PROVIDER_API CALL
Jan Scott  Surgery  [address] [place] [id number]  Phone: [phone]  Fax: [phone]  Follow Up Time: 1 week

## 2023-11-08 NOTE — DISCHARGE NOTE PROVIDER - NSDCFUSCHEDAPPT_GEN_ALL_CORE_FT
Jan Scott  [facility] Physician [facility]  BARIATRIC [address]  Scheduled Appointment: 11/16/2023    Hernan Barnett  [facility] Physician [facility]  INTMED [address]  Scheduled Appointment: 11/20/2023    Jose Arredondo  [facility] Physician [facility]  PULMMED [address]  Scheduled Appointment: 11/29/2023    Jan Scott  [facility]  BARIATRIC [address]  Scheduled Appointment: 12/14/2023    [facility] Physician [facility]  WEIGHTMGMT [address]  Scheduled Appointment: 01/09/2024

## 2023-11-08 NOTE — DISCHARGE NOTE PROVIDER - NSDCFUADDINST_GEN_ALL_CORE_FT
Increase ambulation and utilize incentive spirometry frequently.   Apply ice packs to abdominal wall/incision sites for 20 mins on/20 mins off every 4 hours for the next 24 hours and to shoulders as needed for discomfort.   Continue Bariatric Phase 1 Diet and follow nutritional guidelines as instructed.  Take Tylenol 1000mg every 6 hours for a minimum of 3 days and a maximum of 5 days. Pain medications as needed. If taking narcotic pain medications, may take Colace/OTC stool softener (whole) as directed to prevent constipation.

## 2023-11-08 NOTE — DISCHARGE NOTE PROVIDER - NSDCCPTREATMENT_GEN_ALL_CORE_FT
PRINCIPAL PROCEDURE  Procedure: Laparoscopic sleeve gastrectomy with laparoscopic repair of hiatal hernia if indicated  Findings and Treatment:

## 2023-11-09 ENCOUNTER — TRANSCRIPTION ENCOUNTER (OUTPATIENT)
Age: 48
End: 2023-11-09

## 2023-11-09 VITALS
DIASTOLIC BLOOD PRESSURE: 90 MMHG | SYSTOLIC BLOOD PRESSURE: 133 MMHG | HEART RATE: 90 BPM | TEMPERATURE: 98 F | RESPIRATION RATE: 20 BRPM | OXYGEN SATURATION: 97 %

## 2023-11-09 LAB
ANION GAP SERPL CALC-SCNC: 9 MMOL/L — SIGNIFICANT CHANGE UP (ref 5–17)
ANION GAP SERPL CALC-SCNC: 9 MMOL/L — SIGNIFICANT CHANGE UP (ref 5–17)
BUN SERPL-MCNC: 6 MG/DL — LOW (ref 7–23)
BUN SERPL-MCNC: 6 MG/DL — LOW (ref 7–23)
CALCIUM SERPL-MCNC: 9.3 MG/DL — SIGNIFICANT CHANGE UP (ref 8.5–10.1)
CALCIUM SERPL-MCNC: 9.3 MG/DL — SIGNIFICANT CHANGE UP (ref 8.5–10.1)
CHLORIDE SERPL-SCNC: 108 MMOL/L — SIGNIFICANT CHANGE UP (ref 96–108)
CHLORIDE SERPL-SCNC: 108 MMOL/L — SIGNIFICANT CHANGE UP (ref 96–108)
CO2 SERPL-SCNC: 25 MMOL/L — SIGNIFICANT CHANGE UP (ref 22–31)
CO2 SERPL-SCNC: 25 MMOL/L — SIGNIFICANT CHANGE UP (ref 22–31)
CREAT SERPL-MCNC: 0.54 MG/DL — SIGNIFICANT CHANGE UP (ref 0.5–1.3)
CREAT SERPL-MCNC: 0.54 MG/DL — SIGNIFICANT CHANGE UP (ref 0.5–1.3)
EGFR: 115 ML/MIN/1.73M2 — SIGNIFICANT CHANGE UP
EGFR: 115 ML/MIN/1.73M2 — SIGNIFICANT CHANGE UP
GLUCOSE SERPL-MCNC: 109 MG/DL — HIGH (ref 70–99)
GLUCOSE SERPL-MCNC: 109 MG/DL — HIGH (ref 70–99)
HCT VFR BLD CALC: 34.6 % — SIGNIFICANT CHANGE UP (ref 34.5–45)
HCT VFR BLD CALC: 34.6 % — SIGNIFICANT CHANGE UP (ref 34.5–45)
HGB BLD-MCNC: 11.4 G/DL — LOW (ref 11.5–15.5)
HGB BLD-MCNC: 11.4 G/DL — LOW (ref 11.5–15.5)
MCHC RBC-ENTMCNC: 26.5 PG — LOW (ref 27–34)
MCHC RBC-ENTMCNC: 26.5 PG — LOW (ref 27–34)
MCHC RBC-ENTMCNC: 32.9 GM/DL — SIGNIFICANT CHANGE UP (ref 32–36)
MCHC RBC-ENTMCNC: 32.9 GM/DL — SIGNIFICANT CHANGE UP (ref 32–36)
MCV RBC AUTO: 80.3 FL — SIGNIFICANT CHANGE UP (ref 80–100)
MCV RBC AUTO: 80.3 FL — SIGNIFICANT CHANGE UP (ref 80–100)
NRBC # BLD: 0 /100 WBCS — SIGNIFICANT CHANGE UP (ref 0–0)
NRBC # BLD: 0 /100 WBCS — SIGNIFICANT CHANGE UP (ref 0–0)
PLATELET # BLD AUTO: 342 K/UL — SIGNIFICANT CHANGE UP (ref 150–400)
PLATELET # BLD AUTO: 342 K/UL — SIGNIFICANT CHANGE UP (ref 150–400)
POTASSIUM SERPL-MCNC: 3.4 MMOL/L — LOW (ref 3.5–5.3)
POTASSIUM SERPL-MCNC: 3.4 MMOL/L — LOW (ref 3.5–5.3)
POTASSIUM SERPL-SCNC: 3.4 MMOL/L — LOW (ref 3.5–5.3)
POTASSIUM SERPL-SCNC: 3.4 MMOL/L — LOW (ref 3.5–5.3)
RBC # BLD: 4.31 M/UL — SIGNIFICANT CHANGE UP (ref 3.8–5.2)
RBC # BLD: 4.31 M/UL — SIGNIFICANT CHANGE UP (ref 3.8–5.2)
RBC # FLD: 16 % — HIGH (ref 10.3–14.5)
RBC # FLD: 16 % — HIGH (ref 10.3–14.5)
SODIUM SERPL-SCNC: 142 MMOL/L — SIGNIFICANT CHANGE UP (ref 135–145)
SODIUM SERPL-SCNC: 142 MMOL/L — SIGNIFICANT CHANGE UP (ref 135–145)
WBC # BLD: 9.42 K/UL — SIGNIFICANT CHANGE UP (ref 3.8–10.5)
WBC # BLD: 9.42 K/UL — SIGNIFICANT CHANGE UP (ref 3.8–10.5)
WBC # FLD AUTO: 9.42 K/UL — SIGNIFICANT CHANGE UP (ref 3.8–10.5)
WBC # FLD AUTO: 9.42 K/UL — SIGNIFICANT CHANGE UP (ref 3.8–10.5)

## 2023-11-09 PROCEDURE — 99024 POSTOP FOLLOW-UP VISIT: CPT

## 2023-11-09 PROCEDURE — 81025 URINE PREGNANCY TEST: CPT

## 2023-11-09 PROCEDURE — 88307 TISSUE EXAM BY PATHOLOGIST: CPT

## 2023-11-09 PROCEDURE — 36415 COLL VENOUS BLD VENIPUNCTURE: CPT

## 2023-11-09 PROCEDURE — 94760 N-INVAS EAR/PLS OXIMETRY 1: CPT

## 2023-11-09 PROCEDURE — 80048 BASIC METABOLIC PNL TOTAL CA: CPT

## 2023-11-09 PROCEDURE — 85027 COMPLETE CBC AUTOMATED: CPT

## 2023-11-09 PROCEDURE — C1889: CPT

## 2023-11-09 RX ADMIN — Medication 1000 MILLIGRAM(S): at 11:07

## 2023-11-09 RX ADMIN — ONDANSETRON 4 MILLIGRAM(S): 8 TABLET, FILM COATED ORAL at 06:12

## 2023-11-09 RX ADMIN — ENOXAPARIN SODIUM 40 MILLIGRAM(S): 100 INJECTION SUBCUTANEOUS at 06:12

## 2023-11-09 RX ADMIN — PANTOPRAZOLE SODIUM 40 MILLIGRAM(S): 20 TABLET, DELAYED RELEASE ORAL at 11:39

## 2023-11-09 RX ADMIN — Medication 400 MILLIGRAM(S): at 10:52

## 2023-11-09 RX ADMIN — Medication 400 MILLIGRAM(S): at 04:14

## 2023-11-09 RX ADMIN — SODIUM CHLORIDE 75 MILLILITER(S): 9 INJECTION, SOLUTION INTRAVENOUS at 07:29

## 2023-11-09 RX ADMIN — ONDANSETRON 4 MILLIGRAM(S): 8 TABLET, FILM COATED ORAL at 11:39

## 2023-11-09 RX ADMIN — Medication 1000 MILLIGRAM(S): at 04:40

## 2023-11-09 RX ADMIN — ONDANSETRON 4 MILLIGRAM(S): 8 TABLET, FILM COATED ORAL at 00:20

## 2023-11-09 NOTE — PHARMACOTHERAPY INTERVENTION NOTE - COMMENTS
Meds to beds requested by provider.    The following prescriptions were filled at [facility] [place]: Oxycodone, Ondansetron, Prilosec, Bariatric Vitamins, Tylenol, Simethicone.      Medications delivered by pharmacist at bedside and counseled on indication, directions, and side effects.  Patient verbalized understanding and had no further questions

## 2023-11-09 NOTE — DISCHARGE NOTE NURSING/CASE MANAGEMENT/SOCIAL WORK - NSDCPEFALRISK_GEN_ALL_CORE
For information on Fall & Injury Prevention, visit: [email or web address] OR  [email or web address] OR  https://www.cdc.gov/steadi/patient.html

## 2023-11-09 NOTE — PROGRESS NOTE ADULT - SUBJECTIVE AND OBJECTIVE BOX
PA POST-OP NOTE    s/p laparoscopic sleeve gastrectomy with hiatal hernia repair    SUBJECTIVE:  Patient seen and examined at bedside, alert, resting comfortably, in no acute distress with SCDs intact and functioning.  Patient reports pain is well-controlled with pain medications, not in any acute pain.  Patient reports no nausea. Patient has voided independently once since arriving on floor.   Patient denies vomiting, diarrhea, shortness of breath, dizziness.    OBJECTIVE:  Vital signs stable, afebrile.  I/Os: lactated ringers @150 ml/h    Physical Exam:  Gen: alert, NAD  Lungs: unlabored and equal chest expansion  CVS: pulse regular  ABD: soft, nondistended, appropriate incisional tenderness, incisions - dressings/steristrips clean, dry, intact. Steri strips present  EXT: no edema or calf tenderness bilaterally. SCDs present. Hands with equal and normal sensation, cap refill, ROM b/l. Skin without redness or swelling.     A/P: POD#0, s/p laparoscopic sleeve gastrectomy with hiatal hernia repair, stable,    - Continue current care.  - Diet - NPO with sips and ice chips.  - IVF: LR @ 150 ml/h.  - GI prophylaxis: IV pantoprazole QD.  - DVT prophylaxis: SCDs, enoxaparin QD.  - Analgesia: IV acetaminophen, IV ibuprofen prn, IV Dilaudid prn OR PO oxycodone prn once tolerating clears.  - Antiemetics: ondansetron, Levsin prn.  - Encourage OOB/ambulation.  - Encourage incentive spirometry/cough/deep breathing.  - Abdominal binder for comfort.  - AM labs ordered.    Case and plan discussed with bariatric team and patient's nurse.
POD # 1 s/p Lap Sleeve Gastrectomy with repair of hiatal hernia.    Vital Signs Last 24 Hrs  T(C): 36.9 (09 Nov 2023 05:26), Max: 36.9 (08 Nov 2023 17:15)  T(F): 98.5 (09 Nov 2023 05:26), Max: 98.5 (08 Nov 2023 17:15)  HR: 95 (09 Nov 2023 05:26) (56 - 101)  BP: 130/84 (09 Nov 2023 05:26) (125/76 - 163/88)  BP(mean): --  RR: 18 (09 Nov 2023 05:26) (12 - 18)  SpO2: 95% (09 Nov 2023 05:26) (90% - 99%)    Parameters below as of 09 Nov 2023 05:26  Patient On (Oxygen Delivery Method): room air        11-08 @ 07:01  -  11-09 @ 07:00  --------------------------------------------------------  IN: 0 mL / OUT: 3775 mL / NET: -3775 mL                              11.4   9.42  )-----------( 342      ( 09 Nov 2023 06:10 )             34.6               Physical Exam:  Awake alert and oriented x3 in no acute distress. NCAT, PERRLA, EOMI  Lungs clear bilaterally without wheezes rhonchi or rales.  Regular rate and rhythm  Abdomen soft, nontender, nondistended, positive bowel sounds in all 4 quadrants. No evidence of hernia or masses. Surgical incisions remained well approximated and healing appropriately without erythema, induration or fluctuance. Dressings remained clean dry and intact  Extremities without edema or tenderness.
PULMONARY/CRITICAL CARE  DOS 23  Doing well, ambulated. Less pain. Wore cpap hs.       Patient is a 48y old  Female who presents with a chief complaint of s/p laparoscopic sleeve gastrectomy (2023 14:48)  Mild abdominal discomfort  BRIEF HOSPITAL COURSE: ***    Events last 24 hours: ***    PAST MEDICAL & SURGICAL HISTORY:  Back pain  intermittent pain; not on standing pain medication      History of COVID-19      FABRICIO on CPAP Dr Rizo      Mild obstructive sleep apnea      History of hypothyroidism      History of vitamin D deficiency      Prediabetes      Morbid obesity with BMI of 40.0-44.9, adult      History of 2  sections      History of tubal ligation        Allergies    No Known Allergies    Intolerances      FAMILY HISTORY: NA [facility].; no cigs, etoh; born [place], here for 30 yrs  Family history of hypertension (Father)          Medications:        acetaminophen   IVPB .. 1000 milliGRAM(s) IV Intermittent once  acetaminophen   IVPB .. 1000 milliGRAM(s) IV Intermittent once  HYDROmorphone  Injectable 0.5 milliGRAM(s) IV Push every 4 hours PRN  ibuprofen IVPB .. 800 milliGRAM(s) IV Intermittent every 6 hours PRN  ondansetron Injectable 4 milliGRAM(s) IV Push every 6 hours  oxyCODONE    IR 5 milliGRAM(s) Oral every 4 hours PRN        hyoscyamine SL 0.125 milliGRAM(s) SubLingual every 6 hours PRN  pantoprazole  Injectable 40 milliGRAM(s) IV Push daily  simethicone 80 milliGRAM(s) Chew every 8 hours PRN        lactated ringers. 1000 milliLiter(s) IV Continuous <Continuous>    influenza   Vaccine 0.5 milliLiter(s) IntraMuscular once              ICU Vital Signs Last 24 Hrs  T(C): 36.7 (2023 16:32), Max: 36.7 (2023 06:54)  T(F): 98 (2023 16:32), Max: 98.1 (2023 06:54)  HR: 93 (2023 16:32) (56 - 93)  BP: 136/85 (2023 16:32) (115/66 - 163/88)  BP(mean): --  ABP: --  ABP(mean): --  RR: 18 (2023 16:32) (11 - 18)  SpO2: 99% (2023 16:32) (90% - 99%)    O2 Parameters below as of 2023 16:32  Patient On (Oxygen Delivery Method): room air          Vital Signs Last 24 Hrs  T(C): 36.7 (2023 16:32), Max: 36.7 (2023 06:54)  T(F): 98 (2023 16:32), Max: 98.1 (2023 06:54)  HR: 93 (2023 16:32) (56 - 93)  BP: 136/85 (2023 16:32) (115/66 - 163/88)  BP(mean): --  RR: 18 (2023 16:32) (11 - 18)  SpO2: 99% (2023 16:32) (90% - 99%)    Parameters below as of 2023 16:32  Patient On (Oxygen Delivery Method): room air            I&O's Detail    2023 07:01  -  2023 17:53  --------------------------------------------------------  IN:  Total IN: 0 mL    OUT:    Voided (mL): 1075 mL  Total OUT: 1075 mL    Total NET: -1075 mL            LABS:                CAPILLARY BLOOD GLUCOSE            CULTURES:      Physical Examination:    General: No acute distress.      HEENT: Pupils equal, reactive to light.  Symmetric.    PULM: Clear to auscultation bilaterally, no wheeze rhonchi rales.    CVS: Regular rate and rhythm, no murmurs, rubs, or gallops    ABD: Soft, nondistended, mildly tender, decreased bowel sounds, no masses    EXT: No edema, nontender calves    SKIN: Warm and well perfused, no rashes noted.    NEURO: Alert, oriented, interactive, nonfocal    RADIOLOGY: ***    CRITICAL CARE TIME SPENT: ***

## 2023-11-09 NOTE — DISCHARGE NOTE NURSING/CASE MANAGEMENT/SOCIAL WORK - PATIENT PORTAL LINK FT
You can access the FollowMyHealth Patient Portal offered by [facility] by registering at the following website: [email or web address]. By joining [employer]’s FollowMyHealth portal, you will also be able to view your health information using other applications (apps) compatible with our system.

## 2023-11-09 NOTE — DISCHARGE NOTE NURSING/CASE MANAGEMENT/SOCIAL WORK - NSDCVIVACCINE_GEN_ALL_CORE_FT
COVID-19, mRNA, LNP-S, PF, 100 mcg/ 0.5 mL dose (Moderna); 31-Jul-2021 20:13; Kenisha Llamas (RN); [employer] [id number] (Exp. Date: 10-Dayo-2021); IntraMuscular; Deltoid Right.; 0.5 milliLiter(s);

## 2023-11-09 NOTE — PROGRESS NOTE ADULT - ASSESSMENT
OOB to chair this morning, has already been up and ambulating.  VSS and afebrile.  Comfortable with mild epigastric incisional tenderness.  Denies nausea or vomiting.  Utilized CPAP overnight for FABRICIO.  Abdomen remains, soft, non distended.  Dressings clean and dry.  Good bowel sounds.  Positive flatus and voiding.    Encourage ambulation and incentive spirometry  Routine DVT prophylaxis.  Decrease IVF's  Advance to Bariatric Stage I diet.  Monitor PO intake/tolerance.  Anticipate discharge to home later this afternoon.  Postoperative instructions have been provided including avoidance of heavy lifting and strenuous activities in excess of 20-25 pounds for duration of 4-6 weeks. The patient may shower keeping the incisions clean, dry, covered as needed.  Scheduled f/u with me next week.
Pt. stable postop gastric sleeve.  Hx FABRICIO on cpap hs.  Suggest ambulate, incentive tex.  Fu Dr. Rizo after dc.

## 2023-11-10 ENCOUNTER — NON-APPOINTMENT (OUTPATIENT)
Age: 48
End: 2023-11-10

## 2023-11-15 RX ORDER — MULTIVIT-MIN/FERROUS GLUCONATE 9 MG/15 ML
1 LIQUID (ML) ORAL
Qty: 0 | Refills: 0 | DISCHARGE
Start: 2023-11-15

## 2023-11-16 ENCOUNTER — APPOINTMENT (OUTPATIENT)
Dept: BARIATRICS | Facility: CLINIC | Age: 48
End: 2023-11-16
Payer: COMMERCIAL

## 2023-11-16 VITALS — HEIGHT: 61 IN | WEIGHT: 218.25 LBS | BODY MASS INDEX: 41.21 KG/M2

## 2023-11-16 DIAGNOSIS — E11.9 TYPE 2 DIABETES MELLITUS W/OUT COMPLICATIONS: ICD-10-CM

## 2023-11-16 PROCEDURE — 99024 POSTOP FOLLOW-UP VISIT: CPT

## 2023-11-20 ENCOUNTER — APPOINTMENT (OUTPATIENT)
Dept: INTERNAL MEDICINE | Facility: CLINIC | Age: 48
End: 2023-11-20
Payer: COMMERCIAL

## 2023-11-20 VITALS
DIASTOLIC BLOOD PRESSURE: 74 MMHG | OXYGEN SATURATION: 98 % | HEART RATE: 90 BPM | RESPIRATION RATE: 16 BRPM | BODY MASS INDEX: 40.78 KG/M2 | WEIGHT: 216 LBS | SYSTOLIC BLOOD PRESSURE: 112 MMHG | HEIGHT: 61 IN | TEMPERATURE: 98 F

## 2023-11-20 DIAGNOSIS — E03.9 HYPOTHYROIDISM, UNSPECIFIED: ICD-10-CM

## 2023-11-20 DIAGNOSIS — Z23 ENCOUNTER FOR IMMUNIZATION: ICD-10-CM

## 2023-11-20 PROBLEM — Z86.39 PERSONAL HISTORY OF OTHER ENDOCRINE, NUTRITIONAL AND METABOLIC DISEASE: Chronic | Status: ACTIVE | Noted: 2023-10-25

## 2023-11-20 PROBLEM — E66.01 MORBID (SEVERE) OBESITY DUE TO EXCESS CALORIES: Chronic | Status: ACTIVE | Noted: 2023-10-25

## 2023-11-20 PROBLEM — R73.03 PREDIABETES: Chronic | Status: ACTIVE | Noted: 2023-10-25

## 2023-11-20 PROBLEM — Z86.16 PERSONAL HISTORY OF COVID-19: Chronic | Status: ACTIVE | Noted: 2023-10-25

## 2023-11-20 PROBLEM — G47.33 OBSTRUCTIVE SLEEP APNEA (ADULT) (PEDIATRIC): Chronic | Status: ACTIVE | Noted: 2023-10-25

## 2023-11-20 PROCEDURE — 99214 OFFICE O/P EST MOD 30 MIN: CPT | Mod: 25

## 2023-11-20 PROCEDURE — 90686 IIV4 VACC NO PRSV 0.5 ML IM: CPT

## 2023-11-20 PROCEDURE — 36415 COLL VENOUS BLD VENIPUNCTURE: CPT

## 2023-11-20 PROCEDURE — G0008: CPT

## 2023-11-26 LAB
25(OH)D3 SERPL-MCNC: 23.3 NG/ML
ALBUMIN SERPL ELPH-MCNC: 4.3 G/DL
ALP BLD-CCNC: 78 U/L
ALT SERPL-CCNC: 15 U/L
ANION GAP SERPL CALC-SCNC: 11 MMOL/L
APPEARANCE: ABNORMAL
AST SERPL-CCNC: 13 U/L
BACTERIA: ABNORMAL /HPF
BASOPHILS # BLD AUTO: 0.03 K/UL
BASOPHILS NFR BLD AUTO: 0.6 %
BILIRUB SERPL-MCNC: 0.2 MG/DL
BILIRUBIN URINE: NEGATIVE
BLOOD URINE: ABNORMAL
BUN SERPL-MCNC: 7 MG/DL
CALCIUM SERPL-MCNC: 9.5 MG/DL
CAST: 5 /LPF
CHLORIDE SERPL-SCNC: 103 MMOL/L
CHOLEST SERPL-MCNC: 168 MG/DL
CO2 SERPL-SCNC: 25 MMOL/L
COLOR: NORMAL
CREAT SERPL-MCNC: 0.6 MG/DL
CREAT SPEC-SCNC: 375 MG/DL
EGFR: 112 ML/MIN/1.73M2
EOSINOPHIL # BLD AUTO: 0.07 K/UL
EOSINOPHIL NFR BLD AUTO: 1.4 %
EPITHELIAL CELLS: 12 /HPF
ESTIMATED AVERAGE GLUCOSE: 128 MG/DL
FERRITIN SERPL-MCNC: 20 NG/ML
FOLATE SERPL-MCNC: 14.6 NG/ML
GLUCOSE QUALITATIVE U: NEGATIVE MG/DL
GLUCOSE SERPL-MCNC: 112 MG/DL
HBA1C MFR BLD HPLC: 6.1 %
HCT VFR BLD CALC: 37 %
HDLC SERPL-MCNC: 39 MG/DL
HGB BLD-MCNC: 12 G/DL
IMM GRANULOCYTES NFR BLD AUTO: 0.2 %
IRON SATN MFR SERPL: 8 %
IRON SERPL-MCNC: 28 UG/DL
KETONES URINE: ABNORMAL MG/DL
LDLC SERPL CALC-MCNC: 114 MG/DL
LEUKOCYTE ESTERASE URINE: NEGATIVE
LYMPHOCYTES # BLD AUTO: 1.69 K/UL
LYMPHOCYTES NFR BLD AUTO: 32.7 %
MAN DIFF?: NORMAL
MCHC RBC-ENTMCNC: 26.7 PG
MCHC RBC-ENTMCNC: 32.4 GM/DL
MCV RBC AUTO: 82.2 FL
MICROALBUMIN 24H UR DL<=1MG/L-MCNC: 2.6 MG/DL
MICROALBUMIN/CREAT 24H UR-RTO: 7 MG/G
MICROSCOPIC-UA: NORMAL
MONOCYTES # BLD AUTO: 0.39 K/UL
MONOCYTES NFR BLD AUTO: 7.5 %
MUCUS: PRESENT
NEUTROPHILS # BLD AUTO: 2.98 K/UL
NEUTROPHILS NFR BLD AUTO: 57.6 %
NITRITE URINE: NEGATIVE
NONHDLC SERPL-MCNC: 129 MG/DL
PH URINE: 5.5
PLATELET # BLD AUTO: 356 K/UL
POTASSIUM SERPL-SCNC: 3.8 MMOL/L
PROT SERPL-MCNC: 7.3 G/DL
PROTEIN URINE: NORMAL MG/DL
RBC # BLD: 4.5 M/UL
RBC # FLD: 16.6 %
RED BLOOD CELLS URINE: 1 /HPF
REVIEW: NORMAL
SODIUM SERPL-SCNC: 138 MMOL/L
SPECIFIC GRAVITY URINE: 1.02
T4 FREE SERPL-MCNC: 1.2 NG/DL
TIBC SERPL-MCNC: 361 UG/DL
TRIGL SERPL-MCNC: 78 MG/DL
TSH SERPL-ACNC: 1.99 UIU/ML
UIBC SERPL-MCNC: 333 UG/DL
UROBILINOGEN URINE: 1 MG/DL
VIT B12 SERPL-MCNC: 1483 PG/ML
WBC # FLD AUTO: 5.17 K/UL
WHITE BLOOD CELLS URINE: 2 /HPF

## 2023-11-28 NOTE — PATIENT PROFILE ADULT - NSPRONUTRITIONRISK_GEN_A_NUR
Patient ambulatory to department for second of five doses of venofer. Tolerated venofer infusion well with no adverse rx noted, denies need for AVS with information about Venofer to be printed. Verbally told about most common possible side effects. To return next week for next infusion. No indicators present

## 2023-11-30 ENCOUNTER — APPOINTMENT (OUTPATIENT)
Dept: OPHTHALMOLOGY | Facility: CLINIC | Age: 48
End: 2023-11-30

## 2023-12-14 ENCOUNTER — APPOINTMENT (OUTPATIENT)
Dept: BARIATRICS | Facility: CLINIC | Age: 48
End: 2023-12-14
Payer: COMMERCIAL

## 2023-12-14 VITALS — WEIGHT: 212 LBS | BODY MASS INDEX: 40.02 KG/M2 | HEIGHT: 61 IN

## 2023-12-14 DIAGNOSIS — G47.33 OBSTRUCTIVE SLEEP APNEA (ADULT) (PEDIATRIC): ICD-10-CM

## 2023-12-14 DIAGNOSIS — I10 ESSENTIAL (PRIMARY) HYPERTENSION: ICD-10-CM

## 2023-12-14 DIAGNOSIS — E66.01 MORBID (SEVERE) OBESITY DUE TO EXCESS CALORIES: ICD-10-CM

## 2023-12-14 PROCEDURE — 99024 POSTOP FOLLOW-UP VISIT: CPT

## 2023-12-14 RX ORDER — ONDANSETRON 4 MG/1
4 TABLET, ORALLY DISINTEGRATING ORAL EVERY 8 HOURS
Qty: 15 | Refills: 0 | Status: DISCONTINUED | COMMUNITY
Start: 2023-10-25 | End: 2023-12-14

## 2023-12-14 RX ORDER — OXYCODONE 5 MG/1
5 TABLET ORAL
Qty: 6 | Refills: 0 | Status: DISCONTINUED | COMMUNITY
Start: 2023-10-25 | End: 2023-12-14

## 2023-12-14 NOTE — ASSESSMENT
[FreeTextEntry1] : 1 month status post laparoscopic sleeve gastrectomy.  The patient continues to do well with postoperative weight loss.  She is progress her diet appropriately without adverse events or symptoms.  She is down 6 pounds since her previous encounter.  Physical exam limited by telehealth platform.  Findings documented above as seen on Video interface and by patient accounts.  Nutritional counseling has been provided. The patient is encouraged to remain calorie conscious and continue a low fat, low carbohydrate, high protein diet. Also, emphasis has been placed on the importance of adequate hydration, multi-vitamin supplementation and exercise.  (15 min)  Follow-up in 2 months

## 2023-12-14 NOTE — HISTORY OF PRESENT ILLNESS
[de-identified] : One month s/p Lap Sleeve Gastrectomy.  [Procedure: ___] : Procedure performed: [unfilled]  [Date of Surgery: ___] : Date of Surgery:   [unfilled] [Surgeon Name:   ___] : Surgeon Name: Dr. HERNANDEZ

## 2023-12-14 NOTE — PHYSICAL EXAM
[Obese, well nourished, in no acute distress] : obese, well nourished, in no acute distress [Normal] : affect appropriate [de-identified] : Soft, nontender nondistended, positive bowel sounds in all four quads.  No hernia or masses.  Surgical incisions remain well approximated and healing appropriately without erythema, induration or fluctuance.

## 2023-12-15 ENCOUNTER — APPOINTMENT (OUTPATIENT)
Dept: PULMONOLOGY | Facility: CLINIC | Age: 48
End: 2023-12-15

## 2024-01-08 ENCOUNTER — APPOINTMENT (OUTPATIENT)
Dept: BARIATRICS/WEIGHT MGMT | Facility: CLINIC | Age: 49
End: 2024-01-08

## 2024-05-02 ENCOUNTER — APPOINTMENT (OUTPATIENT)
Dept: BARIATRICS | Facility: CLINIC | Age: 49
End: 2024-05-02
Payer: COMMERCIAL

## 2024-05-02 VITALS
DIASTOLIC BLOOD PRESSURE: 80 MMHG | TEMPERATURE: 97.1 F | SYSTOLIC BLOOD PRESSURE: 120 MMHG | HEIGHT: 61 IN | BODY MASS INDEX: 36.63 KG/M2 | OXYGEN SATURATION: 97 % | HEART RATE: 82 BPM | WEIGHT: 194 LBS

## 2024-05-02 DIAGNOSIS — E46 UNSPECIFIED PROTEIN-CALORIE MALNUTRITION: ICD-10-CM

## 2024-05-02 DIAGNOSIS — R63.4 ABNORMAL WEIGHT LOSS: ICD-10-CM

## 2024-05-02 DIAGNOSIS — Z98.84 BARIATRIC SURGERY STATUS: ICD-10-CM

## 2024-05-02 DIAGNOSIS — E56.9 VITAMIN DEFICIENCY, UNSPECIFIED: ICD-10-CM

## 2024-05-02 DIAGNOSIS — E66.9 OBESITY, UNSPECIFIED: ICD-10-CM

## 2024-05-02 DIAGNOSIS — R79.9 ABNORMAL FINDING OF BLOOD CHEMISTRY, UNSPECIFIED: ICD-10-CM

## 2024-05-02 PROCEDURE — 99214 OFFICE O/P EST MOD 30 MIN: CPT

## 2024-05-02 NOTE — ADDENDUM
TRANSPORT [id number] [FreeTextEntry1] : Pt seen and examined.  6 months s/p Lap Sleeve Gastrectomy.  Down 18 lbs since previous encounter.  40lbs from heaviest weight. Tolerating diet and exercising regularly having recently purchased a treadmill.   Denies adverse events or symptoms. Struggles with compliance of MVI supplementation finding the chewable wafers unpalatable.   Has not been taking the prescribed 4 tabs daily but has been taking at least one.  Will transition to the oral capsules instead which should improve compliance. Nutritional and weight loss counseling has been provided. The importance of weight reduction in the treatment of Obesity and its contribution to resolution of obesity related comorbidities has been discussed.  The patient is encouraged to remain calorie conscious and continue a low fat, low carbohydrate, high protein diet. Eat slowly and chew food well.  Avoid eating and drinking simultaneously.  Also, emphasis has been placed on the importance of adequate hydration, multi-vitamin supplementation and exercise.  (15 min) f/u in 3 months. Rx for post operative blood work provided.

## 2024-05-02 NOTE — PHYSICAL EXAM
[Normal] : affect appropriate [de-identified] : Well, healthy appearing adult  [de-identified] : EOMI, no conjunctival injection, anicteric  [de-identified] : No visualized JVD or audible bruits  [de-identified] : Equal chest rise, non-labored respirations, no audible wheezing  [de-identified] : Regular rate, no audible carotid bruits or JVD appreciated  [de-identified] : soft, NT, ND, no diastasis or hernias appreciated, incisions well healed [de-identified] : HELEN

## 2024-05-02 NOTE — ASSESSMENT
[FreeTextEntry1] : 48 year old F is 6 months s/p Laparoscopic Sleeve Gastrectomy. Very happy with recovery progress, feels great. Weight loss since last visit. Nutrition visit scheduled for 7/11/2024. Doing well overall.   Greater than 15 minutes spent with pt discussing importance of health maintenance principles including dietary and lifestyle changes as well as long-term weight maintenance Reviewed guidelines about nutrition and snacking - protein focus diet with 3 meals/day Maintain better food choices Continue daily Bariatric MVI - discussed with pt to change to caps, vitamin information list given to pt Daily zero calorie liquid intake goal of 64 oz/day Encouraged to participate in a daily exercise regimen incorporating cardio and strength training Track steps - goal approximately 8-10k steps per day   Labs ordered to be done before next visit Return to office in 3 months Follow up with nutritionist as scheduled Follow up with PCP for continuity of care All questions answered   Pt seen with Dr. Scott    Additional time spent before and after visit reviewing chart

## 2024-05-02 NOTE — HISTORY OF PRESENT ILLNESS
[Procedure: ___] : Procedure performed: [unfilled]  [Date of Surgery: ___] : Date of Surgery:   [unfilled] [Surgeon Name:   ___] : Surgeon Name: Dr. HERNANDEZ [de-identified] : 48 year old F is 6 months s/p Laparoscopic Sleeve Gastrectomy. Very happy with recovery progress, feels great. Weight loss since last visit. Nutrition visit scheduled for 7/11/2024. Consuming an adequate protein intake with 3 meals/day with one snack/day (apple). Drinking adequate zero calorie liquid per day. Taking daily Bariatric chewables but would like to switch due to the taste. Ambulating daily for exercise. Sleeping 6hours/night. No abdominal pain, reflux, nausea, vomiting, constipation or diarrhea.

## 2024-05-07 ENCOUNTER — NON-APPOINTMENT (OUTPATIENT)
Age: 49
End: 2024-05-07

## 2024-05-07 LAB
25(OH)D3 SERPL-MCNC: 16.8 NG/ML
ALBUMIN SERPL ELPH-MCNC: 4.2 G/DL
ALP BLD-CCNC: 77 U/L
ALT SERPL-CCNC: 12 U/L
ANION GAP SERPL CALC-SCNC: 9 MMOL/L
AST SERPL-CCNC: 15 U/L
BASOPHILS # BLD AUTO: 0.02 K/UL
BASOPHILS NFR BLD AUTO: 0.6 %
BILIRUB SERPL-MCNC: 0.2 MG/DL
BUN SERPL-MCNC: 8 MG/DL
CALCIUM SERPL-MCNC: 9.3 MG/DL
CHLORIDE SERPL-SCNC: 104 MMOL/L
CO2 SERPL-SCNC: 26 MMOL/L
CREAT SERPL-MCNC: 0.61 MG/DL
EGFR: 112 ML/MIN/1.73M2
EOSINOPHIL # BLD AUTO: 0.11 K/UL
EOSINOPHIL NFR BLD AUTO: 3 %
ESTIMATED AVERAGE GLUCOSE: 123 MG/DL
FOLATE SERPL-MCNC: 8.3 NG/ML
GLUCOSE SERPL-MCNC: 100 MG/DL
HBA1C MFR BLD HPLC: 5.9 %
HCT VFR BLD CALC: 34.8 %
HGB BLD-MCNC: 11.5 G/DL
IMM GRANULOCYTES NFR BLD AUTO: 0 %
IRON SERPL-MCNC: 44 UG/DL
LYMPHOCYTES # BLD AUTO: 1.66 K/UL
LYMPHOCYTES NFR BLD AUTO: 46 %
MAN DIFF?: NORMAL
MCHC RBC-ENTMCNC: 27.1 PG
MCHC RBC-ENTMCNC: 33 GM/DL
MCV RBC AUTO: 81.9 FL
MONOCYTES # BLD AUTO: 0.33 K/UL
MONOCYTES NFR BLD AUTO: 9.1 %
NEUTROPHILS # BLD AUTO: 1.49 K/UL
NEUTROPHILS NFR BLD AUTO: 41.3 %
PLATELET # BLD AUTO: 301 K/UL
POTASSIUM SERPL-SCNC: 4.2 MMOL/L
PROT SERPL-MCNC: 7.2 G/DL
RBC # BLD: 4.25 M/UL
RBC # FLD: 15.3 %
SODIUM SERPL-SCNC: 140 MMOL/L
VIT A SERPL-MCNC: 26.4 UG/DL
VIT B12 SERPL-MCNC: 698 PG/ML
WBC # FLD AUTO: 3.61 K/UL

## 2024-05-07 RX ORDER — ERGOCALCIFEROL 1.25 MG/1
1.25 MG CAPSULE, LIQUID FILLED ORAL
Qty: 12 | Refills: 0 | Status: ACTIVE | COMMUNITY
Start: 2024-05-07 | End: 1900-01-01

## 2024-05-09 LAB — VIT B1 SERPL-MCNC: 76.5 NMOL/L

## 2024-05-22 ENCOUNTER — APPOINTMENT (OUTPATIENT)
Dept: INTERNAL MEDICINE | Facility: CLINIC | Age: 49
End: 2024-05-22

## 2024-06-12 ENCOUNTER — APPOINTMENT (OUTPATIENT)
Dept: INTERNAL MEDICINE | Facility: CLINIC | Age: 49
End: 2024-06-12
Payer: COMMERCIAL

## 2024-06-12 VITALS
RESPIRATION RATE: 15 BRPM | HEART RATE: 77 BPM | SYSTOLIC BLOOD PRESSURE: 104 MMHG | HEIGHT: 70 IN | DIASTOLIC BLOOD PRESSURE: 64 MMHG | OXYGEN SATURATION: 99 % | TEMPERATURE: 98 F | WEIGHT: 190 LBS | BODY MASS INDEX: 27.2 KG/M2

## 2024-06-12 DIAGNOSIS — M62.81 MUSCLE WEAKNESS (GENERALIZED): ICD-10-CM

## 2024-06-12 DIAGNOSIS — D64.9 ANEMIA, UNSPECIFIED: ICD-10-CM

## 2024-06-12 DIAGNOSIS — F41.9 ANXIETY DISORDER, UNSPECIFIED: ICD-10-CM

## 2024-06-12 DIAGNOSIS — R42 DIZZINESS AND GIDDINESS: ICD-10-CM

## 2024-06-12 PROCEDURE — G2211 COMPLEX E/M VISIT ADD ON: CPT

## 2024-06-12 PROCEDURE — 99214 OFFICE O/P EST MOD 30 MIN: CPT

## 2024-06-12 RX ORDER — ESCITALOPRAM OXALATE 10 MG/1
10 TABLET ORAL
Qty: 90 | Refills: 1 | Status: ACTIVE | COMMUNITY
Start: 2024-06-12 | End: 1900-01-01

## 2024-06-12 NOTE — ASSESSMENT
[FreeTextEntry1] : , , Fatigue: check labs consider sleep studies   Anxiety: Psych referral Start on LExapro   Anemia:/ low WBC follow-up lab  PreDiabetes: HbA1C %= 5.9 repeat today  Counseled patient to cut down on processed sugar and carbohydrates. Increase Aerobic exercise and resistance training.

## 2024-06-12 NOTE — HISTORY OF PRESENT ILLNESS
[FreeTextEntry1] : Follow-up [de-identified] : feeling weakness in her legs fatigue s/p bariatric surgery in Nov usually sleep about 4 hours.  discussed sleep deprivation BP  a bit low her.   Anxiety/  tearful today.  was on anxiety meds in the past.  chart shows aripiprazole and benztropine from her past.  had post partum depression prior. refer back to psych for assessment.  [Fatigue] : no fatigue [Nasal Congestion] : no nasal congestion [Cough] : cough [Sputum] : sputum  [Dyspnea] : no dyspnea [Wheezing] : no wheezing [Hypertension] : ~T hypertension [As Noted in HPI] : as noted in HPI [Snoring] : no snoring [Hypersomnolence] : not sleeping much more than usual [Negative] : Pulmonary Hypertension

## 2024-06-13 LAB
25(OH)D3 SERPL-MCNC: 37.2 NG/ML
ALBUMIN SERPL ELPH-MCNC: 4.1 G/DL
ALP BLD-CCNC: 68 U/L
ALT SERPL-CCNC: 12 U/L
ANION GAP SERPL CALC-SCNC: 14 MMOL/L
AST SERPL-CCNC: 17 U/L
BASOPHILS # BLD AUTO: 0.02 K/UL
BASOPHILS NFR BLD AUTO: 0.4 %
BILIRUB SERPL-MCNC: 0.2 MG/DL
BUN SERPL-MCNC: 11 MG/DL
CALCIUM SERPL-MCNC: 9.4 MG/DL
CHLORIDE SERPL-SCNC: 105 MMOL/L
CO2 SERPL-SCNC: 24 MMOL/L
CREAT SERPL-MCNC: 0.64 MG/DL
EGFR: 110 ML/MIN/1.73M2
EOSINOPHIL # BLD AUTO: 0.1 K/UL
EOSINOPHIL NFR BLD AUTO: 2 %
ESTIMATED AVERAGE GLUCOSE: 120 MG/DL
FERRITIN SERPL-MCNC: 5 NG/ML
FOLATE SERPL-MCNC: 11.8 NG/ML
GLUCOSE SERPL-MCNC: 93 MG/DL
HBA1C MFR BLD HPLC: 5.8 %
HCT VFR BLD CALC: 34 %
HGB BLD-MCNC: 10.9 G/DL
IMM GRANULOCYTES NFR BLD AUTO: 0 %
IRON SATN MFR SERPL: 9 %
IRON SERPL-MCNC: 32 UG/DL
LYMPHOCYTES # BLD AUTO: 2.06 K/UL
LYMPHOCYTES NFR BLD AUTO: 41 %
MAGNESIUM SERPL-MCNC: 2 MG/DL
MAN DIFF?: NORMAL
MCHC RBC-ENTMCNC: 27.1 PG
MCHC RBC-ENTMCNC: 32.1 GM/DL
MCV RBC AUTO: 84.6 FL
MONOCYTES # BLD AUTO: 0.39 K/UL
MONOCYTES NFR BLD AUTO: 7.8 %
NEUTROPHILS # BLD AUTO: 2.46 K/UL
NEUTROPHILS NFR BLD AUTO: 48.8 %
PLATELET # BLD AUTO: 273 K/UL
POTASSIUM SERPL-SCNC: 4.2 MMOL/L
PROT SERPL-MCNC: 7.1 G/DL
RBC # BLD: 4.02 M/UL
RBC # FLD: 16.3 %
SODIUM SERPL-SCNC: 143 MMOL/L
TIBC SERPL-MCNC: 372 UG/DL
UIBC SERPL-MCNC: 341 UG/DL
VIT B12 SERPL-MCNC: 748 PG/ML
WBC # FLD AUTO: 5.03 K/UL

## 2024-06-13 RX ORDER — CHLORHEXIDINE GLUCONATE 4 %
325 (65 FE) LIQUID (ML) TOPICAL
Qty: 90 | Refills: 3 | Status: ACTIVE | COMMUNITY
Start: 2024-06-13 | End: 1900-01-01

## 2024-06-26 RX ORDER — LANCETS 30 GAUGE
EACH MISCELLANEOUS
Qty: 1 | Refills: 1 | Status: ACTIVE | COMMUNITY
Start: 2024-06-26 | End: 1900-01-01

## 2024-06-26 RX ORDER — BLOOD SUGAR DIAGNOSTIC
STRIP MISCELLANEOUS
Qty: 60 | Refills: 3 | Status: ACTIVE | COMMUNITY
Start: 2024-06-26 | End: 1900-01-01

## 2024-07-11 ENCOUNTER — APPOINTMENT (OUTPATIENT)
Dept: BARIATRICS/WEIGHT MGMT | Facility: CLINIC | Age: 49
End: 2024-07-11
Payer: COMMERCIAL

## 2024-07-11 DIAGNOSIS — E66.9 OBESITY, UNSPECIFIED: ICD-10-CM

## 2024-07-11 DIAGNOSIS — Z98.84 BARIATRIC SURGERY STATUS: ICD-10-CM

## 2024-07-11 PROCEDURE — 97803 MED NUTRITION INDIV SUBSEQ: CPT | Mod: 95

## 2024-07-17 VITALS — HEIGHT: 70 IN | BODY MASS INDEX: 25.91 KG/M2 | WEIGHT: 181 LBS

## 2024-08-15 ENCOUNTER — APPOINTMENT (OUTPATIENT)
Dept: BARIATRICS | Facility: CLINIC | Age: 49
End: 2024-08-15
Payer: COMMERCIAL

## 2024-08-15 VITALS
HEIGHT: 70 IN | WEIGHT: 181.66 LBS | SYSTOLIC BLOOD PRESSURE: 120 MMHG | BODY MASS INDEX: 26.01 KG/M2 | HEART RATE: 85 BPM | OXYGEN SATURATION: 95 % | DIASTOLIC BLOOD PRESSURE: 80 MMHG

## 2024-08-15 DIAGNOSIS — E66.9 OBESITY, UNSPECIFIED: ICD-10-CM

## 2024-08-15 DIAGNOSIS — R63.4 ABNORMAL WEIGHT LOSS: ICD-10-CM

## 2024-08-15 DIAGNOSIS — E66.01 MORBID (SEVERE) OBESITY DUE TO EXCESS CALORIES: ICD-10-CM

## 2024-08-15 DIAGNOSIS — E11.9 TYPE 2 DIABETES MELLITUS W/OUT COMPLICATIONS: ICD-10-CM

## 2024-08-15 DIAGNOSIS — D64.9 ANEMIA, UNSPECIFIED: ICD-10-CM

## 2024-08-15 DIAGNOSIS — I10 ESSENTIAL (PRIMARY) HYPERTENSION: ICD-10-CM

## 2024-08-15 DIAGNOSIS — Z98.84 BARIATRIC SURGERY STATUS: ICD-10-CM

## 2024-08-15 DIAGNOSIS — E55.9 VITAMIN D DEFICIENCY, UNSPECIFIED: ICD-10-CM

## 2024-08-15 DIAGNOSIS — E03.9 HYPOTHYROIDISM, UNSPECIFIED: ICD-10-CM

## 2024-08-15 PROCEDURE — 99214 OFFICE O/P EST MOD 30 MIN: CPT

## 2024-08-15 NOTE — HISTORY OF PRESENT ILLNESS
[de-identified] : 9-month postoperative follow-up status post laparoscopic sleeve gastrectomy.  The patient is doing quite well extremely satisfied with her progress.  She is lost 13 pounds since her previous encounter. [Procedure: ___] : Procedure performed: [unfilled]  [Date of Surgery: ___] : Date of Surgery:   [unfilled] [Surgeon Name:   ___] : Surgeon Name: Dr. HERNANDEZ

## 2024-08-15 NOTE — PHYSICAL EXAM
[Obese, well nourished, in no acute distress] : obese, well nourished, in no acute distress [Normal] : affect appropriate [de-identified] : Soft, nontender nondistended, positive bowel sounds in all four quads.  No hernia or masses.  Surgical incisions remain well approximated and healing appropriately without erythema, induration or fluctuance.

## 2024-08-15 NOTE — ASSESSMENT
[FreeTextEntry1] : 9-month status post laparoscopic sleeve gastrectomy.  Patient continues to do well and demonstrates successful postoperative weight loss.  She reports tolerating a regular balanced diet.  She feels more energetic.  Occasionally during the day she does get "jitters" which may represent a dip in her blood sugar.  The patient is encouraged to snack between meals as needed with low glycemic index foods such as berries.   I have stressed the importance of maintaining appropriate hydration.  Patient is also chronically anemic.  She continues on iron supplementation as well as vitamin D in addition to her daily multivitamins.  Nutritional and weight loss counseling has been provided. The importance of weight reduction in the treatment of Obesity and its contribution to resolution of obesity related comorbidities has been discussed.  The patient is encouraged to remain calorie conscious and continue a low fat, low carbohydrate, high protein diet. Eat slowly and chew food well.  Avoid eating and drinking simultaneously.  Also, emphasis has been placed on the importance of adequate hydration, multi-vitamin supplementation and exercise.  (15 min)  Follow-up in November for annual postoperative encounter. Prescription for blood work has been provided..

## 2024-08-30 ENCOUNTER — APPOINTMENT (OUTPATIENT)
Dept: INTERNAL MEDICINE | Facility: CLINIC | Age: 49
End: 2024-08-30

## 2024-09-18 ENCOUNTER — APPOINTMENT (OUTPATIENT)
Dept: BARIATRICS/WEIGHT MGMT | Facility: CLINIC | Age: 49
End: 2024-09-18

## 2024-11-07 ENCOUNTER — APPOINTMENT (OUTPATIENT)
Dept: BARIATRICS | Facility: CLINIC | Age: 49
End: 2024-11-07

## 2025-08-08 ENCOUNTER — LABORATORY RESULT (OUTPATIENT)
Age: 50
End: 2025-08-08

## 2025-08-08 ENCOUNTER — APPOINTMENT (OUTPATIENT)
Dept: INTERNAL MEDICINE | Facility: CLINIC | Age: 50
End: 2025-08-08

## 2025-08-08 VITALS
RESPIRATION RATE: 14 BRPM | HEIGHT: 61 IN | DIASTOLIC BLOOD PRESSURE: 88 MMHG | OXYGEN SATURATION: 97 % | WEIGHT: 175 LBS | SYSTOLIC BLOOD PRESSURE: 145 MMHG | BODY MASS INDEX: 33.04 KG/M2 | HEART RATE: 68 BPM | TEMPERATURE: 98.2 F

## 2025-08-08 VITALS — SYSTOLIC BLOOD PRESSURE: 130 MMHG | DIASTOLIC BLOOD PRESSURE: 70 MMHG

## 2025-08-08 DIAGNOSIS — Z00.00 ENCOUNTER FOR GENERAL ADULT MEDICAL EXAMINATION W/OUT ABNORMAL FINDINGS: ICD-10-CM

## 2025-08-08 DIAGNOSIS — Z13.6 ENCOUNTER FOR SCREENING FOR CARDIOVASCULAR DISORDERS: ICD-10-CM

## 2025-08-08 DIAGNOSIS — E55.9 VITAMIN D DEFICIENCY, UNSPECIFIED: ICD-10-CM

## 2025-08-08 PROCEDURE — G0447 BEHAVIOR COUNSEL OBESITY 15M: CPT | Mod: 59

## 2025-08-08 PROCEDURE — 99396 PREV VISIT EST AGE 40-64: CPT

## 2025-08-08 PROCEDURE — 99214 OFFICE O/P EST MOD 30 MIN: CPT | Mod: 25

## 2025-08-08 PROCEDURE — 93000 ELECTROCARDIOGRAM COMPLETE: CPT

## 2025-08-09 LAB
25(OH)D3 SERPL-MCNC: 21.6 NG/ML
ALBUMIN SERPL ELPH-MCNC: 4.2 G/DL
ALP BLD-CCNC: 67 U/L
ALT SERPL-CCNC: 15 U/L
ANION GAP SERPL CALC-SCNC: 15 MMOL/L
APPEARANCE: CLEAR
AST SERPL-CCNC: 16 U/L
BASOPHILS # BLD AUTO: 0.02 K/UL
BASOPHILS NFR BLD AUTO: 0.6 %
BILIRUB SERPL-MCNC: 0.2 MG/DL
BILIRUBIN URINE: NEGATIVE
BLOOD URINE: NEGATIVE
BUN SERPL-MCNC: 9 MG/DL
CALCIUM SERPL-MCNC: 9.4 MG/DL
CHLORIDE SERPL-SCNC: 105 MMOL/L
CHOLEST SERPL-MCNC: 206 MG/DL
CO2 SERPL-SCNC: 22 MMOL/L
COLOR: YELLOW
CREAT SERPL-MCNC: 0.61 MG/DL
EGFRCR SERPLBLD CKD-EPI 2021: 111 ML/MIN/1.73M2
EOSINOPHIL # BLD AUTO: 0.11 K/UL
EOSINOPHIL NFR BLD AUTO: 3.1 %
FERRITIN SERPL-MCNC: 5 NG/ML
FOLATE SERPL-MCNC: 8 NG/ML
GLUCOSE QUALITATIVE U: NEGATIVE MG/DL
GLUCOSE SERPL-MCNC: 88 MG/DL
HCT VFR BLD CALC: 33.8 %
HDLC SERPL-MCNC: 58 MG/DL
HGB BLD-MCNC: 10.3 G/DL
IMM GRANULOCYTES NFR BLD AUTO: 0 %
IRON SATN MFR SERPL: 5 %
IRON SERPL-MCNC: 23 UG/DL
KETONES URINE: NEGATIVE MG/DL
LDLC SERPL-MCNC: 137 MG/DL
LEUKOCYTE ESTERASE URINE: ABNORMAL
LYMPHOCYTES # BLD AUTO: 1.85 K/UL
LYMPHOCYTES NFR BLD AUTO: 52.3 %
MAGNESIUM SERPL-MCNC: 2.2 MG/DL
MAN DIFF?: NORMAL
MCHC RBC-ENTMCNC: 26 PG
MCHC RBC-ENTMCNC: 30.5 G/DL
MCV RBC AUTO: 85.4 FL
MONOCYTES # BLD AUTO: 0.28 K/UL
MONOCYTES NFR BLD AUTO: 7.9 %
NEUTROPHILS # BLD AUTO: 1.28 K/UL
NEUTROPHILS NFR BLD AUTO: 36.1 %
NITRITE URINE: NEGATIVE
NONHDLC SERPL-MCNC: 148 MG/DL
PH URINE: 5.5
PLATELET # BLD AUTO: 306 K/UL
POTASSIUM SERPL-SCNC: 4.2 MMOL/L
PROT SERPL-MCNC: 7.5 G/DL
PROTEIN URINE: NEGATIVE MG/DL
RBC # BLD: 3.96 M/UL
RBC # FLD: 16.9 %
SODIUM SERPL-SCNC: 142 MMOL/L
SPECIFIC GRAVITY URINE: 1.02
TIBC SERPL-MCNC: 437 UG/DL
TRIGL SERPL-MCNC: 58 MG/DL
TSH SERPL-ACNC: 2.15 UIU/ML
UIBC SERPL-MCNC: 414 UG/DL
UROBILINOGEN URINE: 1 MG/DL
VIT B12 SERPL-MCNC: 726 PG/ML
WBC # FLD AUTO: 3.54 K/UL

## 2025-08-10 ENCOUNTER — NON-APPOINTMENT (OUTPATIENT)
Age: 50
End: 2025-08-10

## 2025-08-10 LAB
ESTIMATED AVERAGE GLUCOSE: 126 MG/DL
HBA1C MFR BLD HPLC: 6 %

## 2025-08-15 LAB — VIT B1 SERPL-MCNC: 67.7 NMOL/L

## 2025-08-25 PROBLEM — E78.00 ELEVATED LDL CHOLESTEROL LEVEL: Status: ACTIVE | Noted: 2025-08-25

## (undated) DEVICE — DRAPE TOWEL BLUE 17" X 24"

## (undated) DEVICE — PROTECTOR HEEL / ELBOW FLUFFY

## (undated) DEVICE — Device

## (undated) DEVICE — LIGASURE L-HOOK 44CM

## (undated) DEVICE — PACK GENERAL LAPAROSCOPY

## (undated) DEVICE — SUT ENDOSTITCH DEVICE 10MM

## (undated) DEVICE — APPLICATOR VISTASEAL LAP DUAL 35CM RIGID

## (undated) DEVICE — STAPLER COVIDIEN ENDO GIA XL HANDLE

## (undated) DEVICE — WARMING BLANKET UPPER ADULT

## (undated) DEVICE — PLV/PSP-SUCTION IRRIGATOR STRYKER: Type: DURABLE MEDICAL EQUIPMENT

## (undated) DEVICE — ENDOCATCH II 15MM

## (undated) DEVICE — SUT MONOCRYL 4-0 27" PS-2 UNDYED

## (undated) DEVICE — TROCAR COVIDIEN VERSAPORT BLADELESS OPTICAL 5MM STANDARD

## (undated) DEVICE — PLV-SCD MACHINE: Type: DURABLE MEDICAL EQUIPMENT

## (undated) DEVICE — TROCAR COVIDIEN VERSAONE BLUNT TIP HASSAN 12MM

## (undated) DEVICE — BLADE SCALPEL SAFETYLOCK #15

## (undated) DEVICE — TUBING STRYKER PNEUMOSURE HEATED RTP

## (undated) DEVICE — SUT POLYSORB 0 30" GU-46

## (undated) DEVICE — DRAPE 3/4 SHEET W REINFORCEMENT 56X77"

## (undated) DEVICE — INSUFFLATION NDL COVIDIEN SURGINEEDLE VERESS 120MM

## (undated) DEVICE — GLV 7 PROTEXIS (WHITE)

## (undated) DEVICE — ENDOCATCH 10MM SPECIMEN POUCH